# Patient Record
Sex: FEMALE | Race: WHITE | NOT HISPANIC OR LATINO | URBAN - METROPOLITAN AREA
[De-identification: names, ages, dates, MRNs, and addresses within clinical notes are randomized per-mention and may not be internally consistent; named-entity substitution may affect disease eponyms.]

---

## 2020-03-04 ENCOUNTER — EMERGENCY (EMERGENCY)
Facility: HOSPITAL | Age: 85
LOS: 1 days | Discharge: HOME | End: 2020-03-04
Attending: EMERGENCY MEDICINE | Admitting: EMERGENCY MEDICINE
Payer: MEDICARE

## 2020-03-04 VITALS
RESPIRATION RATE: 18 BRPM | TEMPERATURE: 98 F | HEIGHT: 62 IN | OXYGEN SATURATION: 95 % | WEIGHT: 175.05 LBS | HEART RATE: 84 BPM | DIASTOLIC BLOOD PRESSURE: 73 MMHG | SYSTOLIC BLOOD PRESSURE: 191 MMHG

## 2020-03-04 PROCEDURE — 72100 X-RAY EXAM L-S SPINE 2/3 VWS: CPT | Mod: 26

## 2020-03-04 PROCEDURE — 73090 X-RAY EXAM OF FOREARM: CPT | Mod: 26,LT

## 2020-03-04 PROCEDURE — 73130 X-RAY EXAM OF HAND: CPT | Mod: 26,LT

## 2020-03-04 PROCEDURE — 99284 EMERGENCY DEPT VISIT MOD MDM: CPT

## 2020-03-04 PROCEDURE — 73502 X-RAY EXAM HIP UNI 2-3 VIEWS: CPT | Mod: 26,LT

## 2020-03-04 PROCEDURE — 70450 CT HEAD/BRAIN W/O DYE: CPT | Mod: 26

## 2020-03-04 PROCEDURE — 72125 CT NECK SPINE W/O DYE: CPT | Mod: 26

## 2020-03-04 PROCEDURE — 73110 X-RAY EXAM OF WRIST: CPT | Mod: 26,LT

## 2020-03-04 RX ORDER — LIDOCAINE 4 G/100G
1 CREAM TOPICAL ONCE
Refills: 0 | Status: COMPLETED | OUTPATIENT
Start: 2020-03-04 | End: 2020-03-04

## 2020-03-04 RX ORDER — ACETAMINOPHEN 500 MG
650 TABLET ORAL ONCE
Refills: 0 | Status: COMPLETED | OUTPATIENT
Start: 2020-03-04 | End: 2020-03-04

## 2020-03-04 RX ADMIN — Medication 650 MILLIGRAM(S): at 23:49

## 2020-03-04 RX ADMIN — Medication 650 MILLIGRAM(S): at 22:49

## 2020-03-04 NOTE — ED PROVIDER NOTE - PMH
Diabetes    Hyperlipidemia, unspecified hyperlipidemia type    Hypertension, unspecified type    Stented coronary artery

## 2020-03-04 NOTE — ED PROVIDER NOTE - ATTENDING CONTRIBUTION TO CARE
84yo female s/p fall with several complaints, no LOC, no blood thinners  exam:+cervical spasm, no midlline vertebral tenderness, LUE diffusely tender, no deformity, neurovasc intact  plan: CT head and neck, Xr left upper extremity  agree with assessment and plan of PA

## 2020-03-04 NOTE — ED ADULT NURSE NOTE - CAS TRG GENERAL NORM CIRC DET
DATA - [facility] request to assist with medication insurance coverage issues r/t med home delivery/set-up.    ACTION - Request received from Saritha Workman [facility] this week.  Brief chart review and discussion with [facility].  Recent conversations between [facility] and [facility] indicate that [facility] cannot provide home med set-up under client's current Part D insurance, but most likely could with other plans such as [facility].  [facility] requesting [facility]'s assisting in reviewing and assisting client with possible insurance change as recommended.    PLAN - [facility] will review Part D plan options via Medicare.gov and make further recommendations.  If client is interested in changing plans, [facility] should be able to assist him in doing so.  Also will want to review client's current status with long-term care (LTC) services, as per [facility] noted that client may be enrolled with the [employer] program.  Involvement with LTC services may or may not impact medication coverage options.    Strong peripheral pulses

## 2020-03-04 NOTE — ED ADULT NURSE NOTE - CHIEF COMPLAINT QUOTE
tripped down 3 steps, denies hitting head, left forearm swelling, +CMS, +radial pulses, able to wiggle fingers. bruising to left leg also. Detail Level: Detailed Quality 131: Pain Assessment And Follow-Up: Pain assessment documented as positive using a standardized tool AND a follow-up plan is documented Quality 130: Documentation Of Current Medications In The Medical Record: Current Medications Documented Additional Notes: 6/10 for knee Quality 474: Zoster Vaccination Status: Shingrix Vaccination Administered or Previously Received

## 2020-03-04 NOTE — ED PROVIDER NOTE - PATIENT PORTAL LINK FT
You can access the FollowMyHealth Patient Portal offered by [facility] by registering at the following website: [email or web address]. By joining [facility]’s FollowMyHealth portal, you will also be able to view your health information using other applications (apps) compatible with our system.

## 2020-03-04 NOTE — ED PROVIDER NOTE - CLINICAL SUMMARY MEDICAL DECISION MAKING FREE TEXT BOX
mechanical fall down 3 steps, on plavix, left forearm injury, r/o fx, neck pain, left buttock/hip pain with rom,  will obtain imaging, give pain med

## 2020-03-04 NOTE — ED ADULT NURSE NOTE - CHPI ED NUR SYMPTOMS NEG
no fever/no loss of consciousness/no deformity/no weakness/no numbness/no vomiting/no bleeding/no abrasion/no confusion/no tingling

## 2020-03-04 NOTE — ED ADULT NURSE NOTE - OBJECTIVE STATEMENT
pt walked in s/p fall, reports she tumbled 3 steps down, denies hitting head, no LOC, presented with left forearm swelling and mild pain. Ecchymosis noted to left hip. pt ambulatory, +ROM all extremities, denies numbness or tingling.

## 2020-03-04 NOTE — ED PROVIDER NOTE - LOCATION
head, neck, left wrist, left forearm, left hip/buttock hip/left hip buttock pain with palpation, rom, left buttock small hematoma noted wrist/left wrist, hand, forearm pain with palpation, + bruising, swelling/hand

## 2020-03-04 NOTE — ED ADULT TRIAGE NOTE - CHIEF COMPLAINT QUOTE
tripped down 3 steps, denies hitting head, left forearm swelling, +CMS, +radial pulses, able to wiggle fingers. bruising to left leg also.

## 2020-03-04 NOTE — ED PROVIDER NOTE - CARE PLAN
Principal Discharge DX:	Fall, initial encounter Principal Discharge DX:	Fall, initial encounter  Secondary Diagnosis:	Contusion

## 2020-03-04 NOTE — ED PROVIDER NOTE - OBJECTIVE STATEMENT
85 y female accompanied to ED by son and daughter in who states patient was coming out of a building was walking down steps, lost her balance, fell down 3 steps, struck her left forearm, left buttock,  no loc,  no vomiting, not sure if she hit her head , has neck pain with rom.  patient ambulated after fall, patient is on plavix,  PMD Dr Gutiérrez, No orthopedic

## 2020-03-05 VITALS
TEMPERATURE: 99 F | HEART RATE: 83 BPM | RESPIRATION RATE: 14 BRPM | DIASTOLIC BLOOD PRESSURE: 58 MMHG | OXYGEN SATURATION: 96 % | SYSTOLIC BLOOD PRESSURE: 137 MMHG

## 2020-03-05 PROCEDURE — 99284 EMERGENCY DEPT VISIT MOD MDM: CPT | Mod: 25

## 2020-03-05 PROCEDURE — 73130 X-RAY EXAM OF HAND: CPT

## 2020-03-05 PROCEDURE — 73090 X-RAY EXAM OF FOREARM: CPT

## 2020-03-05 PROCEDURE — 73502 X-RAY EXAM HIP UNI 2-3 VIEWS: CPT

## 2020-03-05 PROCEDURE — 72125 CT NECK SPINE W/O DYE: CPT

## 2020-03-05 PROCEDURE — 73110 X-RAY EXAM OF WRIST: CPT

## 2020-03-05 PROCEDURE — 70450 CT HEAD/BRAIN W/O DYE: CPT

## 2020-03-05 PROCEDURE — 72100 X-RAY EXAM L-S SPINE 2/3 VWS: CPT

## 2020-03-05 RX ADMIN — LIDOCAINE 1 PATCH: 4 CREAM TOPICAL at 00:42

## 2021-01-29 ENCOUNTER — ASOB RESULT (OUTPATIENT)
Age: 86
End: 2021-01-29

## 2021-01-29 ENCOUNTER — APPOINTMENT (OUTPATIENT)
Dept: ANTEPARTUM | Facility: CLINIC | Age: 86
End: 2021-01-29
Payer: MEDICARE

## 2021-01-29 PROBLEM — Z95.5 PRESENCE OF CORONARY ANGIOPLASTY IMPLANT AND GRAFT: Chronic | Status: ACTIVE | Noted: 2020-03-04

## 2021-01-29 PROBLEM — I10 ESSENTIAL (PRIMARY) HYPERTENSION: Chronic | Status: ACTIVE | Noted: 2020-03-04

## 2021-01-29 PROBLEM — E78.5 HYPERLIPIDEMIA, UNSPECIFIED: Chronic | Status: ACTIVE | Noted: 2020-03-04

## 2021-01-29 PROBLEM — Z00.00 ENCOUNTER FOR PREVENTIVE HEALTH EXAMINATION: Status: ACTIVE | Noted: 2021-01-29

## 2021-01-29 PROBLEM — E11.9 TYPE 2 DIABETES MELLITUS WITHOUT COMPLICATIONS: Chronic | Status: ACTIVE | Noted: 2020-03-04

## 2021-01-29 PROCEDURE — 76856 US EXAM PELVIC COMPLETE: CPT | Mod: 59

## 2021-01-29 PROCEDURE — 76830 TRANSVAGINAL US NON-OB: CPT

## 2021-11-22 ENCOUNTER — TRANSCRIPTION ENCOUNTER (OUTPATIENT)
Age: 86
End: 2021-11-22

## 2022-06-25 ENCOUNTER — NON-APPOINTMENT (OUTPATIENT)
Age: 87
End: 2022-06-25

## 2022-06-26 ENCOUNTER — EMERGENCY (EMERGENCY)
Facility: HOSPITAL | Age: 87
LOS: 1 days | Discharge: HOME | End: 2022-06-26
Attending: EMERGENCY MEDICINE | Admitting: EMERGENCY MEDICINE

## 2022-06-26 ENCOUNTER — EMERGENCY (EMERGENCY)
Facility: HOSPITAL | Age: 87
LOS: 1 days | Discharge: ANOTHER HOSPITAL | End: 2022-06-26
Attending: EMERGENCY MEDICINE | Admitting: EMERGENCY MEDICINE
Payer: MEDICARE

## 2022-06-26 VITALS
HEART RATE: 80 BPM | RESPIRATION RATE: 20 BRPM | DIASTOLIC BLOOD PRESSURE: 65 MMHG | OXYGEN SATURATION: 97 % | SYSTOLIC BLOOD PRESSURE: 105 MMHG

## 2022-06-26 VITALS
WEIGHT: 162.04 LBS | RESPIRATION RATE: 16 BRPM | TEMPERATURE: 98 F | SYSTOLIC BLOOD PRESSURE: 141 MMHG | DIASTOLIC BLOOD PRESSURE: 69 MMHG | OXYGEN SATURATION: 94 % | HEART RATE: 80 BPM | HEIGHT: 62 IN

## 2022-06-26 VITALS
HEIGHT: 62 IN | WEIGHT: 162.04 LBS | SYSTOLIC BLOOD PRESSURE: 198 MMHG | HEART RATE: 89 BPM | TEMPERATURE: 98 F | RESPIRATION RATE: 14 BRPM | OXYGEN SATURATION: 96 % | DIASTOLIC BLOOD PRESSURE: 96 MMHG

## 2022-06-26 LAB
ALBUMIN SERPL ELPH-MCNC: 3.1 G/DL — LOW (ref 3.3–5)
ALP SERPL-CCNC: 59 U/L — SIGNIFICANT CHANGE UP (ref 40–120)
ALT FLD-CCNC: 17 U/L — SIGNIFICANT CHANGE UP (ref 12–78)
ANION GAP SERPL CALC-SCNC: 4 MMOL/L — LOW (ref 5–17)
APPEARANCE UR: CLEAR — SIGNIFICANT CHANGE UP
APTT BLD: 23.7 SEC — LOW (ref 27.5–35.5)
AST SERPL-CCNC: 12 U/L — LOW (ref 15–37)
BACTERIA # UR AUTO: ABNORMAL
BILIRUB SERPL-MCNC: 0.5 MG/DL — SIGNIFICANT CHANGE UP (ref 0.2–1.2)
BILIRUB UR-MCNC: NEGATIVE — SIGNIFICANT CHANGE UP
BUN SERPL-MCNC: 29 MG/DL — HIGH (ref 7–23)
CALCIUM SERPL-MCNC: 9.1 MG/DL — SIGNIFICANT CHANGE UP (ref 8.5–10.1)
CHLORIDE SERPL-SCNC: 104 MMOL/L — SIGNIFICANT CHANGE UP (ref 96–108)
CK MB BLD-MCNC: 4.3 % — HIGH (ref 0–3.5)
CK MB CFR SERPL CALC: 2.4 NG/ML — SIGNIFICANT CHANGE UP (ref 0–3.6)
CK SERPL-CCNC: 56 U/L — SIGNIFICANT CHANGE UP (ref 26–192)
CO2 SERPL-SCNC: 30 MMOL/L — SIGNIFICANT CHANGE UP (ref 22–31)
COLOR SPEC: YELLOW — SIGNIFICANT CHANGE UP
CREAT SERPL-MCNC: 0.91 MG/DL — SIGNIFICANT CHANGE UP (ref 0.5–1.3)
DIFF PNL FLD: ABNORMAL
EGFR: 61 ML/MIN/1.73M2 — SIGNIFICANT CHANGE UP
EPI CELLS # UR: ABNORMAL
FLUAV AG NPH QL: SIGNIFICANT CHANGE UP
FLUBV AG NPH QL: SIGNIFICANT CHANGE UP
GLUCOSE BLDC GLUCOMTR-MCNC: 199 MG/DL — HIGH (ref 70–99)
GLUCOSE SERPL-MCNC: 180 MG/DL — HIGH (ref 70–99)
GLUCOSE UR QL: NEGATIVE — SIGNIFICANT CHANGE UP
HCT VFR BLD CALC: 37.3 % — SIGNIFICANT CHANGE UP (ref 34.5–45)
HGB BLD-MCNC: 11.5 G/DL — SIGNIFICANT CHANGE UP (ref 11.5–15.5)
INR BLD: 1.07 RATIO — SIGNIFICANT CHANGE UP (ref 0.88–1.16)
KETONES UR-MCNC: NEGATIVE — SIGNIFICANT CHANGE UP
LEUKOCYTE ESTERASE UR-ACNC: ABNORMAL
MCHC RBC-ENTMCNC: 28.3 PG — SIGNIFICANT CHANGE UP (ref 27–34)
MCHC RBC-ENTMCNC: 30.8 GM/DL — LOW (ref 32–36)
MCV RBC AUTO: 91.9 FL — SIGNIFICANT CHANGE UP (ref 80–100)
NITRITE UR-MCNC: NEGATIVE — SIGNIFICANT CHANGE UP
NRBC # BLD: 0 /100 WBCS — SIGNIFICANT CHANGE UP (ref 0–0)
PH UR: 6 — SIGNIFICANT CHANGE UP (ref 5–8)
PLATELET # BLD AUTO: 169 K/UL — SIGNIFICANT CHANGE UP (ref 150–400)
POTASSIUM SERPL-MCNC: 4.5 MMOL/L — SIGNIFICANT CHANGE UP (ref 3.5–5.3)
POTASSIUM SERPL-SCNC: 4.5 MMOL/L — SIGNIFICANT CHANGE UP (ref 3.5–5.3)
PROT SERPL-MCNC: 7.2 G/DL — SIGNIFICANT CHANGE UP (ref 6–8.3)
PROT UR-MCNC: NEGATIVE — SIGNIFICANT CHANGE UP
PROTHROM AB SERPL-ACNC: 12.5 SEC — SIGNIFICANT CHANGE UP (ref 10.5–13.4)
RBC # BLD: 4.06 M/UL — SIGNIFICANT CHANGE UP (ref 3.8–5.2)
RBC # FLD: 13.1 % — SIGNIFICANT CHANGE UP (ref 10.3–14.5)
RBC CASTS # UR COMP ASSIST: ABNORMAL /HPF (ref 0–4)
RSV RNA NPH QL NAA+NON-PROBE: SIGNIFICANT CHANGE UP
SARS-COV-2 RNA SPEC QL NAA+PROBE: SIGNIFICANT CHANGE UP
SODIUM SERPL-SCNC: 138 MMOL/L — SIGNIFICANT CHANGE UP (ref 135–145)
SP GR SPEC: 1.01 — SIGNIFICANT CHANGE UP (ref 1.01–1.02)
TROPONIN I, HIGH SENSITIVITY RESULT: 15.6 NG/L — SIGNIFICANT CHANGE UP
UROBILINOGEN FLD QL: NEGATIVE — SIGNIFICANT CHANGE UP
WBC # BLD: 9.01 K/UL — SIGNIFICANT CHANGE UP (ref 3.8–10.5)
WBC # FLD AUTO: 9.01 K/UL — SIGNIFICANT CHANGE UP (ref 3.8–10.5)
WBC UR QL: ABNORMAL

## 2022-06-26 PROCEDURE — 85027 COMPLETE CBC AUTOMATED: CPT

## 2022-06-26 PROCEDURE — 70450 CT HEAD/BRAIN W/O DYE: CPT | Mod: 26,ME

## 2022-06-26 PROCEDURE — 99283 EMERGENCY DEPT VISIT LOW MDM: CPT

## 2022-06-26 PROCEDURE — 84484 ASSAY OF TROPONIN QUANT: CPT

## 2022-06-26 PROCEDURE — 82553 CREATINE MB FRACTION: CPT

## 2022-06-26 PROCEDURE — 87637 SARSCOV2&INF A&B&RSV AMP PRB: CPT

## 2022-06-26 PROCEDURE — 85730 THROMBOPLASTIN TIME PARTIAL: CPT

## 2022-06-26 PROCEDURE — 99285 EMERGENCY DEPT VISIT HI MDM: CPT | Mod: 25

## 2022-06-26 PROCEDURE — 70450 CT HEAD/BRAIN W/O DYE: CPT | Mod: ME

## 2022-06-26 PROCEDURE — 71045 X-RAY EXAM CHEST 1 VIEW: CPT

## 2022-06-26 PROCEDURE — 71045 X-RAY EXAM CHEST 1 VIEW: CPT | Mod: 26

## 2022-06-26 PROCEDURE — 93010 ELECTROCARDIOGRAM REPORT: CPT

## 2022-06-26 PROCEDURE — G1004: CPT

## 2022-06-26 PROCEDURE — 85610 PROTHROMBIN TIME: CPT

## 2022-06-26 PROCEDURE — 93005 ELECTROCARDIOGRAM TRACING: CPT

## 2022-06-26 PROCEDURE — 99285 EMERGENCY DEPT VISIT HI MDM: CPT | Mod: FS

## 2022-06-26 PROCEDURE — 36415 COLL VENOUS BLD VENIPUNCTURE: CPT

## 2022-06-26 PROCEDURE — 82962 GLUCOSE BLOOD TEST: CPT

## 2022-06-26 PROCEDURE — 82550 ASSAY OF CK (CPK): CPT

## 2022-06-26 PROCEDURE — 81001 URINALYSIS AUTO W/SCOPE: CPT

## 2022-06-26 PROCEDURE — 80053 COMPREHEN METABOLIC PANEL: CPT

## 2022-06-26 RX ORDER — GLIMEPIRIDE 1 MG
1 TABLET ORAL
Qty: 0 | Refills: 0 | DISCHARGE

## 2022-06-26 RX ORDER — SITAGLIPTIN AND METFORMIN HYDROCHLORIDE 500; 50 MG/1; MG/1
1 TABLET, FILM COATED ORAL
Qty: 0 | Refills: 0 | DISCHARGE

## 2022-06-26 NOTE — ED PROVIDER NOTE - CHPI ED SYMPTOMS NEG
no fever/no loss of consciousness/no nausea/no numbness/no syncope/no vomiting/no weakness/no change in level of consciousness

## 2022-06-26 NOTE — ED PROVIDER NOTE - NEURO NEGATIVE STATEMENT, MLM
++ loss of consciousness today, no gait abnormality, no headache, no sensory deficits, and no weakness.? seizure today ?

## 2022-06-26 NOTE — ED PROVIDER NOTE - CARE PROVIDER_API CALL
Joseluis Salcedo)  Internal Medicine  [address]  [place] [address]  Phone: [phone]  Fax: [phone]  Established Patient  Follow Up Time: 1-3 Days    Tay Desai)  Otolaryngology  [address], [address]  [place] [address]  Phone: [phone]  Fax: [phone]  Follow Up Time: Urgent

## 2022-06-26 NOTE — ED PROVIDER NOTE - NSFOLLOWUPINSTRUCTIONS_ED_ALL_ED_FT
Nosebleed, Adult      A nosebleed is when blood comes out of the nose. Nosebleeds are common and can be caused by many things. They are usually not a sign of a serious medical problem.      Follow these instructions at home:      When you have a nosebleed:      •Sit down.      •Tilt your head a little forward.    •Follow these steps:  1.Pinch your nose with a clean towel or tissue.      2.Keep pinching your nose for 5 minutes. Do not let go.      3.After 5 minutes, let go of your nose.      4.If there is still bleeding, do these steps again. Keep doing these steps until the bleeding stops.        • Do not put tissues or other things in your nose to stop the bleeding.      •Avoid lying down or putting your head back.      •Use a nose spray decongestant as told by your doctor.      After a nosebleed:     •Try not to blow your nose or sniffle for several hours.      •Try not to strain, lift, or bend at the waist for several days.    •Aspirin and blood-thinning medicines make bleeding more likely. If you take these medicines:  •Ask your doctor if you should stop taking them or if you should change how much you take.      •Do not stop taking the medicine unless your doctor tells you to.      •If your nosebleed was caused by dryness, use over-the-counter saline nasal spray or gel and humidifier as told by your doctor. This will keep the inside of your nose moist and allow it to heal. If you need to use one of these products:  •Choose one that is water-soluble.       •Use only as much as you need and use it only as often as needed.       •Do not lie down right away after you use it.      •If you get nosebleeds often, talk with your doctor about treatments. These may include:  •Nasal cautery. A chemical swab or electrical device is used to lightly burn tiny blood vessels inside the nose. This helps stop or prevent nosebleeds.      •Nasal packing. A gauze or other material is placed in the nose to keep constant pressure on the bleeding area.          Contact a doctor if:    •You have a fever.      •You get nosebleeds often.      •You are getting nosebleeds more often than usual.      •You bruise very easily.      •You have something stuck in your nose.      •You have bleeding in your mouth.      •You vomit or cough up brown material.      •You get a nosebleed after you start a new medicine.        Get help right away if:    •You have a nosebleed after you fall or hurt your head.      •Your nosebleed does not go away after 20 minutes.      •You feel dizzy or weak.      •You have unusual bleeding from other parts of your body.      •You have unusual bruising on other parts of your body.      •You get sweaty.      •You vomit blood.        Summary    •Nosebleeds are common. They are usually not a sign of a serious medical problem.      •When you have a nosebleed, sit down and tilt your head a little forward. Pinch your nose with a clean tissue for 5 minutes.      •Use saline spray or saline gel and a humidifier as told by your doctor.      •Get help right away if your nosebleed does not go away after 20 minutes.      This information is not intended to replace advice given to you by your health care provider. Make sure you discuss any questions you have with your health care provider.

## 2022-06-26 NOTE — ED PROVIDER NOTE - PROVIDER TOKENS
PROVIDER:[TOKEN:[[id number]],FOLLOWUP:[1-3 Days],ESTABLISHEDPATIENT:[T]],PROVIDER:[TOKEN:[[id number]],FOLLOWUP:[Urgent]]

## 2022-06-26 NOTE — ED ADULT NURSE NOTE - OBJECTIVE STATEMENT
89yo female BIBA as per ems "she had a sudden nose bleed, shes on Eliquis".  pt denies any pain/distress.

## 2022-06-26 NOTE — ED ADULT NURSE NOTE - CHIEF COMPLAINT QUOTE
patient passed out sitting on a chair- was at [facility] earlier for epistaxis from the right nostril. Fsbs- 160

## 2022-06-26 NOTE — ED PROVIDER NOTE - OBJECTIVE STATEMENT
89 yo female with PMHX DM, HTN, HLD, CAD present to ED s/p syncopal episode today.   Per daughter patient was seen in [place] ED for nose bleed today which resolved on its own.   Then went home. While at home patient said that she did not feel well. Felt like she would pass out. Then while sitting patient head went back and food that she had been eating fell out of her mouth. Entire episode lasted about 30 seconds. Patient lost her urine during episode. After episode patient was disorientated for a short amount of time. Patient had h/o seizure as a child and resolved after menarche.   Denies head trauma, HA, dizziness or lightheadedness. Pt has known h/o vertigo. Dr Gutiérrez, is PCP.   Denies CP or SOB. Denies abd pain n/v/d/c.   Denies urinary symptoms.

## 2022-06-26 NOTE — ED PROVIDER NOTE - ENMT, MLM
Airway patent, Nasal with dried blood. Mouth with normal mucosa. Throat has no vesicles, no oropharyngeal exudates and uvula is midline.

## 2022-06-26 NOTE — ED PROVIDER NOTE - PATIENT PORTAL LINK FT
You can access the FollowMyHealth Patient Portal offered by [facility] by registering at the following website: [email or web address]. By joining [employer]’s FollowMyHealth portal, you will also be able to view your health information using other applications (apps) compatible with our system.

## 2022-06-26 NOTE — ED ADULT NURSE NOTE - OBJECTIVE STATEMENT
Patient is 87yo F presents with c/o syncope episode. Patient reports she was seen at [facility] earlier today with nose bleed, bleeding stopped and she was discharged home. Patient is 89yo F presents with c/o syncope episode. Patient reports she was seen at [facility] earlier today with nose bleed, bleeding stopped and she was discharged home. Patient went home and was Patient is 87yo F presents with c/o syncope episode. Patient reports she was seen at [facility] earlier today with nose bleed, bleeding stopped and she was discharged home. Patient went home with daughter, reported to daughter "I do not feel well" then had a near syncope episode. Patient reports she felt nausea and pain from her stomach radiating up prior to near syncope episode while sitting in chair. Daughter repors the patient had food in her mouth at the time so she pushed her forward so she would not choke on it.  Daughter reports patient did not fully lose consciousness but was "not fully there". Patient AOx3 now, reports her head still feels a little cloudy, denies CP or SOB.

## 2022-06-26 NOTE — ED PROVIDER NOTE - OBJECTIVE STATEMENT
89 yo F with hx of HTN BIBA for eval of rt sided nosebleed today. States she scratched her nose and it started to bleed. Takes baby ASA daily. PCP [facility].  Bleeding has no stopped. Pt states she feels well.

## 2022-06-26 NOTE — ED PROVIDER NOTE - NS_EDPROVIDERDISPOUSERTYPE_ED_A_ED
11-Jun-2022 13:36 11-Jun-2022 22:10 12-Jun-2022 14:11 17-Jun-2022 15:59 12-Jun-2022 12:37 Attending Attestation (For Attendings USE Only)...

## 2022-06-26 NOTE — ED ADULT NURSE NOTE - NSICDXPASTMEDICALHX_GEN_ALL_CORE_FT
PAST MEDICAL HISTORY:  Diabetes     Hyperlipidemia, unspecified hyperlipidemia type     Hypertension, unspecified type     Stented coronary artery

## 2022-06-26 NOTE — ED PROVIDER NOTE - CLINICAL SUMMARY MEDICAL DECISION MAKING FREE TEXT BOX
dorothy 89 yo F pw sp syncopal episode today . Pt had small episode of epistaxis earlier today - was seen at [facility] ed, but had stopped and no intervention was needed. Pt now went home, then co feeling dizzy , then passed out. Pts daughter was with pt - states never fully passed out - ?? absence type seizure - unclear post-ictal. No acute chest pain. no dyspnea. no CONTI / easy recent fatigue. no numb/ting/focal weak. no agg/allev factors. no other acute co or changes.  Pt vaccinated for covid.  exam; MM MOist. neck supple. no meningeal signs. abd soft, NT. no hsm. no cvat. cta bl, no w/r/r. nl cardiac exam. no c/c/e. nl non-focal neuro exam. no other acute findings.  check labs, CT, dw neuro / cardio.  [facility], neuro / cardio- pt being xferred to [facility] for admission due to no EEG capability at [facility].

## 2022-06-26 NOTE — ED PROVIDER NOTE - PROGRESS NOTE DETAILS
Per Dr. Tony, pt should be evaluated by cardiology.   If cardiology clears for dc then pt to be dc home for outpatient EEG.   If cardiology wants to admit then transfer to [facility] for EEG. case d/w Dr. Byrd at [facility], neurology.   Will speak with cardiology at [facility].

## 2022-06-26 NOTE — ED ADULT TRIAGE NOTE - CHIEF COMPLAINT QUOTE
patient passed out sitting on a chair- was at [facility] earlier for epistaxis from the right nostril. Fsbs- 160

## 2022-06-27 ENCOUNTER — INPATIENT (INPATIENT)
Facility: HOSPITAL | Age: 87
LOS: 8 days | Discharge: HOME | DRG: 287 | End: 2022-07-06
Attending: INTERNAL MEDICINE | Admitting: INTERNAL MEDICINE
Payer: MEDICARE

## 2022-06-27 VITALS
HEIGHT: 62 IN | DIASTOLIC BLOOD PRESSURE: 74 MMHG | HEART RATE: 77 BPM | WEIGHT: 176.37 LBS | TEMPERATURE: 98 F | SYSTOLIC BLOOD PRESSURE: 148 MMHG | OXYGEN SATURATION: 96 % | RESPIRATION RATE: 18 BRPM

## 2022-06-27 VITALS — TEMPERATURE: 98 F | HEART RATE: 84 BPM | SYSTOLIC BLOOD PRESSURE: 158 MMHG | DIASTOLIC BLOOD PRESSURE: 84 MMHG

## 2022-06-27 DIAGNOSIS — R55 SYNCOPE AND COLLAPSE: ICD-10-CM

## 2022-06-27 DIAGNOSIS — E11.9 TYPE 2 DIABETES MELLITUS WITHOUT COMPLICATIONS: ICD-10-CM

## 2022-06-27 DIAGNOSIS — E78.5 HYPERLIPIDEMIA, UNSPECIFIED: ICD-10-CM

## 2022-06-27 DIAGNOSIS — I10 ESSENTIAL (PRIMARY) HYPERTENSION: ICD-10-CM

## 2022-06-27 LAB
ALBUMIN SERPL ELPH-MCNC: 3.5 G/DL — SIGNIFICANT CHANGE UP (ref 3.3–5)
ALP SERPL-CCNC: 57 U/L — SIGNIFICANT CHANGE UP (ref 40–120)
ALT FLD-CCNC: 13 U/L — SIGNIFICANT CHANGE UP (ref 10–45)
ANION GAP SERPL CALC-SCNC: 11 MMOL/L — SIGNIFICANT CHANGE UP (ref 5–17)
AST SERPL-CCNC: 13 U/L — SIGNIFICANT CHANGE UP (ref 10–40)
BASOPHILS # BLD AUTO: 0.04 K/UL — SIGNIFICANT CHANGE UP (ref 0–0.2)
BASOPHILS NFR BLD AUTO: 0.4 % — SIGNIFICANT CHANGE UP (ref 0–2)
BILIRUB SERPL-MCNC: 0.3 MG/DL — SIGNIFICANT CHANGE UP (ref 0.2–1.2)
BUN SERPL-MCNC: 28 MG/DL — HIGH (ref 7–23)
CALCIUM SERPL-MCNC: 8.8 MG/DL — SIGNIFICANT CHANGE UP (ref 8.4–10.5)
CHLORIDE SERPL-SCNC: 103 MMOL/L — SIGNIFICANT CHANGE UP (ref 96–108)
CO2 SERPL-SCNC: 24 MMOL/L — SIGNIFICANT CHANGE UP (ref 22–31)
CREAT SERPL-MCNC: 0.68 MG/DL — SIGNIFICANT CHANGE UP (ref 0.5–1.3)
EGFR: 84 ML/MIN/1.73M2 — SIGNIFICANT CHANGE UP
EOSINOPHIL # BLD AUTO: 0.22 K/UL — SIGNIFICANT CHANGE UP (ref 0–0.5)
EOSINOPHIL NFR BLD AUTO: 2.3 % — SIGNIFICANT CHANGE UP (ref 0–6)
GLUCOSE BLDC GLUCOMTR-MCNC: 109 MG/DL — HIGH (ref 70–99)
GLUCOSE BLDC GLUCOMTR-MCNC: 171 MG/DL — HIGH (ref 70–99)
GLUCOSE BLDC GLUCOMTR-MCNC: 173 MG/DL — HIGH (ref 70–99)
GLUCOSE BLDC GLUCOMTR-MCNC: 249 MG/DL — HIGH (ref 70–99)
GLUCOSE BLDC GLUCOMTR-MCNC: 358 MG/DL — HIGH (ref 70–99)
GLUCOSE SERPL-MCNC: 147 MG/DL — HIGH (ref 70–99)
HCT VFR BLD CALC: 33.2 % — LOW (ref 34.5–45)
HGB BLD-MCNC: 10.3 G/DL — LOW (ref 11.5–15.5)
IMM GRANULOCYTES NFR BLD AUTO: 0.4 % — SIGNIFICANT CHANGE UP (ref 0–1.5)
LYMPHOCYTES # BLD AUTO: 2.21 K/UL — SIGNIFICANT CHANGE UP (ref 1–3.3)
LYMPHOCYTES # BLD AUTO: 23.3 % — SIGNIFICANT CHANGE UP (ref 13–44)
MCHC RBC-ENTMCNC: 28.5 PG — SIGNIFICANT CHANGE UP (ref 27–34)
MCHC RBC-ENTMCNC: 31 GM/DL — LOW (ref 32–36)
MCV RBC AUTO: 92 FL — SIGNIFICANT CHANGE UP (ref 80–100)
MONOCYTES # BLD AUTO: 0.79 K/UL — SIGNIFICANT CHANGE UP (ref 0–0.9)
MONOCYTES NFR BLD AUTO: 8.3 % — SIGNIFICANT CHANGE UP (ref 2–14)
NEUTROPHILS # BLD AUTO: 6.18 K/UL — SIGNIFICANT CHANGE UP (ref 1.8–7.4)
NEUTROPHILS NFR BLD AUTO: 65.3 % — SIGNIFICANT CHANGE UP (ref 43–77)
NRBC # BLD: 0 /100 WBCS — SIGNIFICANT CHANGE UP (ref 0–0)
PLATELET # BLD AUTO: 151 K/UL — SIGNIFICANT CHANGE UP (ref 150–400)
POTASSIUM SERPL-MCNC: 4.3 MMOL/L — SIGNIFICANT CHANGE UP (ref 3.5–5.3)
POTASSIUM SERPL-SCNC: 4.3 MMOL/L — SIGNIFICANT CHANGE UP (ref 3.5–5.3)
PROT SERPL-MCNC: 6.6 G/DL — SIGNIFICANT CHANGE UP (ref 6–8.3)
RBC # BLD: 3.61 M/UL — LOW (ref 3.8–5.2)
RBC # FLD: 12.9 % — SIGNIFICANT CHANGE UP (ref 10.3–14.5)
SODIUM SERPL-SCNC: 138 MMOL/L — SIGNIFICANT CHANGE UP (ref 135–145)
WBC # BLD: 9.48 K/UL — SIGNIFICANT CHANGE UP (ref 3.8–10.5)
WBC # FLD AUTO: 9.48 K/UL — SIGNIFICANT CHANGE UP (ref 3.8–10.5)

## 2022-06-27 PROCEDURE — 93010 ELECTROCARDIOGRAM REPORT: CPT | Mod: GC

## 2022-06-27 PROCEDURE — 99222 1ST HOSP IP/OBS MODERATE 55: CPT

## 2022-06-27 PROCEDURE — 71045 X-RAY EXAM CHEST 1 VIEW: CPT | Mod: 26

## 2022-06-27 PROCEDURE — 99223 1ST HOSP IP/OBS HIGH 75: CPT | Mod: GC

## 2022-06-27 PROCEDURE — 99285 EMERGENCY DEPT VISIT HI MDM: CPT | Mod: GC

## 2022-06-27 RX ORDER — SODIUM CHLORIDE 9 MG/ML
1000 INJECTION, SOLUTION INTRAVENOUS
Refills: 0 | Status: DISCONTINUED | OUTPATIENT
Start: 2022-06-27 | End: 2022-07-06

## 2022-06-27 RX ORDER — INSULIN GLARGINE 100 [IU]/ML
6 INJECTION, SOLUTION SUBCUTANEOUS AT BEDTIME
Refills: 0 | Status: DISCONTINUED | OUTPATIENT
Start: 2022-06-27 | End: 2022-06-30

## 2022-06-27 RX ORDER — DEXTROSE 50 % IN WATER 50 %
15 SYRINGE (ML) INTRAVENOUS ONCE
Refills: 0 | Status: DISCONTINUED | OUTPATIENT
Start: 2022-06-27 | End: 2022-07-06

## 2022-06-27 RX ORDER — HEPARIN SODIUM 5000 [USP'U]/ML
5000 INJECTION INTRAVENOUS; SUBCUTANEOUS EVERY 12 HOURS
Refills: 0 | Status: DISCONTINUED | OUTPATIENT
Start: 2022-06-27 | End: 2022-07-06

## 2022-06-27 RX ORDER — INSULIN LISPRO 100/ML
VIAL (ML) SUBCUTANEOUS
Refills: 0 | Status: DISCONTINUED | OUTPATIENT
Start: 2022-06-27 | End: 2022-06-28

## 2022-06-27 RX ORDER — ESCITALOPRAM OXALATE 10 MG/1
10 TABLET, FILM COATED ORAL DAILY
Refills: 0 | Status: DISCONTINUED | OUTPATIENT
Start: 2022-06-27 | End: 2022-07-06

## 2022-06-27 RX ORDER — SODIUM CHLORIDE 9 MG/ML
1000 INJECTION INTRAMUSCULAR; INTRAVENOUS; SUBCUTANEOUS ONCE
Refills: 0 | Status: COMPLETED | OUTPATIENT
Start: 2022-06-27 | End: 2022-06-27

## 2022-06-27 RX ORDER — BUPROPION HYDROCHLORIDE 150 MG/1
150 TABLET, EXTENDED RELEASE ORAL DAILY
Refills: 0 | Status: DISCONTINUED | OUTPATIENT
Start: 2022-06-27 | End: 2022-07-06

## 2022-06-27 RX ORDER — CLOPIDOGREL BISULFATE 75 MG/1
75 TABLET, FILM COATED ORAL DAILY
Refills: 0 | Status: DISCONTINUED | OUTPATIENT
Start: 2022-06-27 | End: 2022-06-30

## 2022-06-27 RX ORDER — METOPROLOL TARTRATE 50 MG
50 TABLET ORAL DAILY
Refills: 0 | Status: DISCONTINUED | OUTPATIENT
Start: 2022-06-27 | End: 2022-07-01

## 2022-06-27 RX ORDER — DEXTROSE 50 % IN WATER 50 %
25 SYRINGE (ML) INTRAVENOUS ONCE
Refills: 0 | Status: DISCONTINUED | OUTPATIENT
Start: 2022-06-27 | End: 2022-07-06

## 2022-06-27 RX ORDER — LISINOPRIL 2.5 MG/1
10 TABLET ORAL DAILY
Refills: 0 | Status: DISCONTINUED | OUTPATIENT
Start: 2022-06-27 | End: 2022-06-29

## 2022-06-27 RX ORDER — ASPIRIN/CALCIUM CARB/MAGNESIUM 324 MG
81 TABLET ORAL DAILY
Refills: 0 | Status: DISCONTINUED | OUTPATIENT
Start: 2022-06-27 | End: 2022-07-06

## 2022-06-27 RX ORDER — GLUCAGON INJECTION, SOLUTION 0.5 MG/.1ML
1 INJECTION, SOLUTION SUBCUTANEOUS ONCE
Refills: 0 | Status: DISCONTINUED | OUTPATIENT
Start: 2022-06-27 | End: 2022-07-06

## 2022-06-27 RX ORDER — ALLOPURINOL 300 MG
100 TABLET ORAL DAILY
Refills: 0 | Status: DISCONTINUED | OUTPATIENT
Start: 2022-06-27 | End: 2022-07-06

## 2022-06-27 RX ORDER — INSULIN LISPRO 100/ML
VIAL (ML) SUBCUTANEOUS AT BEDTIME
Refills: 0 | Status: DISCONTINUED | OUTPATIENT
Start: 2022-06-27 | End: 2022-06-28

## 2022-06-27 RX ORDER — ATORVASTATIN CALCIUM 80 MG/1
40 TABLET, FILM COATED ORAL AT BEDTIME
Refills: 0 | Status: DISCONTINUED | OUTPATIENT
Start: 2022-06-27 | End: 2022-07-06

## 2022-06-27 RX ORDER — DEXTROSE 50 % IN WATER 50 %
12.5 SYRINGE (ML) INTRAVENOUS ONCE
Refills: 0 | Status: DISCONTINUED | OUTPATIENT
Start: 2022-06-27 | End: 2022-07-06

## 2022-06-27 RX ADMIN — ATORVASTATIN CALCIUM 40 MILLIGRAM(S): 80 TABLET, FILM COATED ORAL at 21:32

## 2022-06-27 RX ADMIN — BUPROPION HYDROCHLORIDE 150 MILLIGRAM(S): 150 TABLET, EXTENDED RELEASE ORAL at 13:32

## 2022-06-27 RX ADMIN — ESCITALOPRAM OXALATE 10 MILLIGRAM(S): 10 TABLET, FILM COATED ORAL at 12:09

## 2022-06-27 RX ADMIN — LISINOPRIL 10 MILLIGRAM(S): 2.5 TABLET ORAL at 16:22

## 2022-06-27 RX ADMIN — Medication 50 MILLIGRAM(S): at 16:22

## 2022-06-27 RX ADMIN — Medication 5: at 12:10

## 2022-06-27 RX ADMIN — SODIUM CHLORIDE 1000 MILLILITER(S): 9 INJECTION INTRAMUSCULAR; INTRAVENOUS; SUBCUTANEOUS at 03:19

## 2022-06-27 RX ADMIN — CLOPIDOGREL BISULFATE 75 MILLIGRAM(S): 75 TABLET, FILM COATED ORAL at 12:09

## 2022-06-27 RX ADMIN — INSULIN GLARGINE 6 UNIT(S): 100 INJECTION, SOLUTION SUBCUTANEOUS at 23:03

## 2022-06-27 RX ADMIN — Medication 81 MILLIGRAM(S): at 12:09

## 2022-06-27 RX ADMIN — Medication 100 MILLIGRAM(S): at 12:09

## 2022-06-27 RX ADMIN — Medication 1: at 09:10

## 2022-06-27 RX ADMIN — HEPARIN SODIUM 5000 UNIT(S): 5000 INJECTION INTRAVENOUS; SUBCUTANEOUS at 16:22

## 2022-06-27 NOTE — CONSULT NOTE ADULT - SUBJECTIVE AND OBJECTIVE BOX
HPI:  88 year old RH female     with a past medical history of HTN, HLD, DM, CAD with stents     who is presenting as a transfer from [place] for evaluation of possible seizure episode.    At baseline, the patient is ambulatory with a cane when she goes outside, lives alone and performs her own ADLs. The patient had an episode of nosebleeding for which she first presented to Urgent Care and then went to [facility] ED where her bleeding spontaneously stopped and she was discharged home. She then went home and was sitting down and eating a pastry when she told her daughter who was present that the patient felt like she was going to faint. The patient stated she had symptoms of her vision darkening, a symptom of epigastric discomfort and nausea, sweating and then had lost consciousness. As per the daughter, the patient began to lean back, her eyes rolled back, and to avoid falling backwards and vomiting was pushed forward by the daughter. The episode lasted several seconds and then the patient regained consciousness. EMS was called.    The patient then seemed slightly disoriented  which lasted several minutes and had completely resolved by the time that EMS had arrived. It was noted that there was some liquid by the patient which was concerning for urinary incontinence however it unclear as the patient stated that her undergarments and dress were dry. The patient was then brought to the ED at [place] and was to undergo cardiac workup and was transferred to [facility] as there was no EEG monitoring. Currently, the patient states that she feels well and denied headaches, visual changes, auditory changes, numbness, tingling, weakness.  Patient notes that in the past she had a fall several months ago while she was ambulating and thinks that she may have lost consciousness during that time.  Patient stated that in the past when she was 10 years old, she was diagnosed with seizures. These episodes were marked by not being able to speak for a brief period of time and disorientation. Stated that she feels that she might remember these episodes but was unsure. She denied taking medications for these events and noted that when she underwent menarche, these episodes stopped and she has not had any since.  Is taking Wellbutrin and has been taking the same dose for 16 years as per daughter.     NIHSS: 0  MRS: 0     (27 Jun 2022 07:07)  Patient has history of diabetes, A1C?, on oral meds at home. Patient follows up with PCP for diabetes management.  Endo was consulted for glycemic control.    PAST MEDICAL & SURGICAL HISTORY:  Diabetes      Hypertension, unspecified type      Stented coronary artery      Hyperlipidemia, unspecified hyperlipidemia type          FAMILY HISTORY:      Social History:    Outpatient Medications:    MEDICATIONS  (STANDING):  allopurinol 100 milliGRAM(s) Oral daily  aspirin enteric coated 81 milliGRAM(s) Oral daily  atorvastatin 40 milliGRAM(s) Oral at bedtime  buPROPion XL (24-Hour) . 150 milliGRAM(s) Oral daily  clopidogrel Tablet 75 milliGRAM(s) Oral daily  dextrose 5%. 1000 milliLiter(s) (50 mL/Hr) IV Continuous <Continuous>  dextrose 5%. 1000 milliLiter(s) (100 mL/Hr) IV Continuous <Continuous>  dextrose 50% Injectable 25 Gram(s) IV Push once  dextrose 50% Injectable 12.5 Gram(s) IV Push once  dextrose 50% Injectable 25 Gram(s) IV Push once  escitalopram 10 milliGRAM(s) Oral daily  glucagon  Injectable 1 milliGRAM(s) IntraMuscular once  heparin   Injectable 5000 Unit(s) SubCutaneous every 12 hours  insulin glargine Injectable (LANTUS) 6 Unit(s) SubCutaneous at bedtime  insulin lispro (ADMELOG) corrective regimen sliding scale   SubCutaneous at bedtime  insulin lispro (ADMELOG) corrective regimen sliding scale   SubCutaneous three times a day before meals  lisinopril 10 milliGRAM(s) Oral daily  metoprolol succinate ER 50 milliGRAM(s) Oral daily    MEDICATIONS  (PRN):  dextrose Oral Gel 15 Gram(s) Oral once PRN Blood Glucose LESS THAN 70 milliGRAM(s)/deciliter      Allergies    No Known Allergies    Intolerances      Review of Systems:  Constitutional: No fever, no chills  Eyes: No blurry vision  Neuro: No tremors  HEENT: No pain, no neck swelling  Cardiovascular: No chest pain, no palpitations  Respiratory: Has SOB, no cough  GI: No nausea, vomiting, abdominal pain  : No dysuria  Skin: no rash  MSK: Has leg swelling.  Psych: no depression  Endocrine: no polyuria, polydipsia    ALL OTHER SYSTEMS REVIEWED AND NEGATIVE    UNABLE TO OBTAIN    PHYSICAL EXAM:  VITALS: T(C): 36.7 (06-27-22 @ 16:14)  T(F): 98 (06-27-22 @ 16:14), Max: 98.5 (06-26-22 @ 18:24)  HR: 81 (06-27-22 @ 16:14) (72 - 84)  BP: 146/79 (06-27-22 @ 16:14) (139/80 - 166/68)  RR:  (15 - 22)  SpO2:  (95% - 98%)  Wt(kg): --  GENERAL: NAD, well-groomed, well-developed  EYES: No proptosis, no lid lag  HEENT:  Atraumatic, Normocephalic  THYROID: Normal size, no palpable nodules  RESPIRATORY: Clear to auscultation bilaterally; No rales, rhonchi, wheezing  CARDIOVASCULAR: Si S2, No murmurs;  GI: Soft, non distended, normal bowel sounds  SKIN: Dry, intact, No rashes or lesions  MUSCULOSKELETAL: Has BL lower extremity edema.  NEURO:  no tremor, sensation decreased in feet BL,    POCT Blood Glucose.: 358 mg/dL (06-27-22 @ 11:28)  POCT Blood Glucose.: 171 mg/dL (06-27-22 @ 09:00)  POCT Blood Glucose.: 141 mg/dL (06-26-22 @ 18:21)  POCT Blood Glucose.: 199 mg/dL (06-26-22 @ 12:26)                            10.3   9.48  )-----------( 151      ( 27 Jun 2022 02:27 )             33.2       06-27    138  |  103  |  28<H>  ----------------------------<  147<H>  4.3   |  24  |  0.68    eGFR: 84    Ca    8.8      06-27    TPro  6.6  /  Alb  3.5  /  TBili  0.3  /  DBili  x   /  AST  13  /  ALT  13  /  AlkPhos  57  06-27      Thyroid Function Tests:              Radiology:                    HPI:  88 year old RH female     with a past medical history of HTN, HLD, DM, CAD with stents     who is presenting as a transfer from [place] for evaluation of possible seizure episode.    At baseline, the patient is ambulatory with a cane when she goes outside, lives alone and performs her own ADLs. The patient had an episode of  nosebleeding for which she first presented to Urgent Care and then went to [facility] ED where her bleeding spontaneously stopped and she was discharged home. She then went home and was sitting down and eating a pastry when she told her daughter who was present that the patient felt like she was going to faint. The patient stated she had symptoms of her vision darkening, a symptom of epigastric discomfort and nausea, sweating and then had lost consciousness. As per the daughter, the patient began to lean back, her eyes rolled back, and to avoid falling backwards and vomiting was pushed forward by the daughter. The episode lasted several seconds and then the patient regained consciousness. EMS was called.    The patient then seemed slightly disoriented  which lasted several minutes and had completely resolved by the time that EMS had arrived. It was noted that there was some liquid by the patient which was concerning for urinary incontinence however it unclear as the patient stated that her undergarments and dress were dry. The patient was then brought to the ED at [place] and was to undergo cardiac workup and was transferred to [facility] as there was no EEG monitoring. Currently, the patient states that she feels well and denied headaches, visual changes, auditory changes, numbness, tingling, weakness.  Patient notes that in the past she had a fall several months ago while she was ambulating and thinks that she may have lost consciousness during that time.  Patient stated that in the past when she was 10 years old, she was diagnosed with seizures. These episodes were marked by not being able to speak for a brief period of time and disorientation. Stated that she feels that she might remember these episodes but was unsure. She denied taking medications for these events and noted that when she underwent menarche, these episodes stopped and she has not had any since.  Is taking Wellbutrin and has been taking the same dose for 16 years as per daughter.     NIHSS: 0  MRS: 0     (27 Jun 2022 07:07)  Patient has history of diabetes, A1C?, on oral meds at home. Patient follows up with PCP for diabetes management.  Endo was consulted for glycemic control.    PAST MEDICAL & SURGICAL HISTORY:  Diabetes      Hypertension, unspecified type      Stented coronary artery      Hyperlipidemia, unspecified hyperlipidemia type          FAMILY HISTORY:      Social History:    Outpatient Medications:    MEDICATIONS  (STANDING):  allopurinol 100 milliGRAM(s) Oral daily  aspirin enteric coated 81 milliGRAM(s) Oral daily  atorvastatin 40 milliGRAM(s) Oral at bedtime  buPROPion XL (24-Hour) . 150 milliGRAM(s) Oral daily  clopidogrel Tablet 75 milliGRAM(s) Oral daily  dextrose 5%. 1000 milliLiter(s) (50 mL/Hr) IV Continuous <Continuous>  dextrose 5%. 1000 milliLiter(s) (100 mL/Hr) IV Continuous <Continuous>  dextrose 50% Injectable 25 Gram(s) IV Push once  dextrose 50% Injectable 12.5 Gram(s) IV Push once  dextrose 50% Injectable 25 Gram(s) IV Push once  escitalopram 10 milliGRAM(s) Oral daily  glucagon  Injectable 1 milliGRAM(s) IntraMuscular once  heparin   Injectable 5000 Unit(s) SubCutaneous every 12 hours  insulin glargine Injectable (LANTUS) 6 Unit(s) SubCutaneous at bedtime  insulin lispro (ADMELOG) corrective regimen sliding scale   SubCutaneous at bedtime  insulin lispro (ADMELOG) corrective regimen sliding scale   SubCutaneous three times a day before meals  lisinopril 10 milliGRAM(s) Oral daily  metoprolol succinate ER 50 milliGRAM(s) Oral daily    MEDICATIONS  (PRN):  dextrose Oral Gel 15 Gram(s) Oral once PRN Blood Glucose LESS THAN 70 milliGRAM(s)/deciliter      Allergies    No Known Allergies    Intolerances      Review of Systems:  Constitutional: No fever, no chills  Eyes: No blurry vision  Neuro: No tremors  HEENT: No pain, no neck swelling  Cardiovascular: No chest pain, no palpitations  Respiratory: Has SOB, no cough  GI: No nausea, vomiting, abdominal pain  : No dysuria  Skin: no rash  MSK: Has leg swelling.  Psych: no depression  Endocrine: no polyuria, polydipsia    ALL OTHER SYSTEMS REVIEWED AND NEGATIVE    UNABLE TO OBTAIN    PHYSICAL EXAM:  VITALS: T(C): 36.7 (06-27-22 @ 16:14)  T(F): 98 (06-27-22 @ 16:14), Max: 98.5 (06-26-22 @ 18:24)  HR: 81 (06-27-22 @ 16:14) (72 - 84)  BP: 146/79 (06-27-22 @ 16:14) (139/80 - 166/68)  RR:  (15 - 22)  SpO2:  (95% - 98%)  Wt(kg): --  GENERAL: NAD, well-groomed, well-developed  EYES: No proptosis, no lid lag  HEENT:  Atraumatic, Normocephalic  THYROID: Normal size, no palpable nodules  RESPIRATORY: Clear to auscultation bilaterally; No rales, rhonchi, wheezing  CARDIOVASCULAR: Si S2, No murmurs;  GI: Soft, non distended, normal bowel sounds  SKIN: Dry, intact, No rashes or lesions  MUSCULOSKELETAL: Has BL lower extremity edema.  NEURO:  no tremor, sensation decreased in feet BL,    POCT Blood Glucose.: 358 mg/dL (06-27-22 @ 11:28)  POCT Blood Glucose.: 171 mg/dL (06-27-22 @ 09:00)  POCT Blood Glucose.: 141 mg/dL (06-26-22 @ 18:21)  POCT Blood Glucose.: 199 mg/dL (06-26-22 @ 12:26)                            10.3   9.48  )-----------( 151      ( 27 Jun 2022 02:27 )             33.2       06-27    138  |  103  |  28<H>  ----------------------------<  147<H>  4.3   |  24  |  0.68    eGFR: 84    Ca    8.8      06-27    TPro  6.6  /  Alb  3.5  /  TBili  0.3  /  DBili  x   /  AST  13  /  ALT  13  /  AlkPhos  57  06-27      Thyroid Function Tests:              Radiology:

## 2022-06-27 NOTE — CONSULT NOTE ADULT - ASSESSMENT
Assessment  DMT2: 88y Female with DM T2 with hyperglycemia, A1C?, was on oral meds/Janumet at home, now on insulin coverage, had elevated FS this afternoon, blood sugars trending within otherwise acceptable range, no hypoglycemic episodes.  Syncope: workup in progress, monitored.  HTN: Controlled,  on antihypertensive medications.  HLD: On statin.      Jaspal Augustine MD  Cell: [phone]  Office: [phone]       Assessment  DMT2: 88y Female with DM T2 with hyperglycemia, A1C?, was on oral meds/Janumet at home, now on insulin coverage, had elevated FS this afternoon,  blood sugars trending within otherwise acceptable range, no hypoglycemic episodes.  Syncope: workup in progress, monitored.  HTN: Controlled,  on antihypertensive medications.  HLD: On statin.      Jaspal Augustine MD  Cell: [phone]  Office: [phone]

## 2022-06-27 NOTE — ED ADULT NURSE REASSESSMENT NOTE - NS ED NURSE REASSESS COMMENT FT1
Patient resting comfortably, breathing unlabored, VSS, no signs of acute distress, no requests at this time, plan of care explained, meds and insulin administered, side rails raised, son at bedside, comfort and safety maintained.

## 2022-06-27 NOTE — PATIENT PROFILE ADULT - FALL HARM RISK - HARM RISK INTERVENTIONS

## 2022-06-27 NOTE — PHYSICAL THERAPY INITIAL EVALUATION ADULT - PERTINENT HX OF CURRENT PROBLEM, REHAB EVAL
88 year old RH female h/o HTN, HLD, DM, CAD with stents who is presenting as a transfer from [place] for evaluation of possible seizure episode./  and  s/p  syncop LOC episode with prodrome of darkening of vision, sweating, nausea with history of  cad, need  to  r/o  cardiac causes HCT: (-) CXR: (-)

## 2022-06-27 NOTE — ED PROVIDER NOTE - CADM POA URETHRAL CATHETER
Letter by Heidi Hernandez DO at      Author: Heidi Hernandez DO Service: -- Author Type: --    Filed:  Encounter Date: 3/29/2019 Status: (Other)         March 29, 2019     Patient: Indra Jason   YOB: 1963   Date of Visit: 3/29/2019       To Whom It May Concern:    It is my medical opinion that Indra Jason Is not feeling well and may be unsafe to be home alone tonight. I saw her in clinic today and did a treatment and she should be monitored. Please excuse her son in law Amandeep Delcid from work 3/29/19 to take care of her.     If you have any questions or concerns, please don't hesitate to call.    Sincerely,        Electronically signed by Heidi Hernandez DO        No

## 2022-06-27 NOTE — CONSULT NOTE ADULT - SUBJECTIVE AND OBJECTIVE BOX
HPI:  Karen Gomez is an 88 year old RH female with a past medical history of HTN, HLD, DM, CAD with stents who is presenting as a transfer from [place] for evaluation of possible seizure episode. At baseline, the patient is ambulatory with a cane when she goes outside, lives alone and performs her own ADLs. The patient had an episode of nosebleeding for which she first presented to Urgent Care and then went to [facility] ED where her bleeding spontaneously stopped and she was discharged home. She then went home and was sitting down and eating a pastry when she told her daughter who was present that the patient felt like she was going to faint. The patient stated she had symptoms of her vision darkening, a symptom of epigastric discomfort and nausea, sweating and then had lost consciousness. As per the daughter, the patient began to lean back, her eyes rolled back, and to avoid falling backwards and vomiting was pushed forward by the daughter. The episode lasted several seconds and then the patient regained consciousness. EMS was called. The patient then seemed slightly disoriented  which lasted several minutes and had completely resolved by the time that EMS had arrived. It was noted that there was some liquid by the patient which was concerning for urinary incontinence however it unclear as the patient stated that her undergarments and dress were dry. The patient was then brought to the ED at [place] and was to undergo cardiac workup and was transferred to [facility] as there was no EEG monitoring. Currently, the patient states that she feels well and denied headaches, visual changes, auditory changes, numbness, tingling, weakness.  Patient notes that in the past she had a fall several months ago while she was ambulating and thinks that she may have lost consciousness during that time.  Patient stated that in the past when she was 10 years old, she was diagnosed with seizures. These episodes were marked by not being able to speak for a brief period of time and disorientation. Stated that she feels that she might remember these episodes but was unsure. She denied taking medications for these events and noted that when she underwent menarche, these episodes stopped and she has not had any since.  Is taking Wellbutrin and has been taking the same dose for 16 years as per daughter.     NIHSS: 0  MRS: 0    REVIEW OF SYSTEMS    A 10-system ROS was performed and is negative except for those items noted above and/or in the HPI.    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  Lives alone    MEDICATIONS (HOME):  Home Medications:  allopurinol 100 mg oral tablet: 1 tab(s) orally once a day (2022 11:46)  amLODIPine 2.5 mg oral tablet: 1 tab(s) orally once a day (at bedtime) (2022 11:46)  Aspir 81 oral delayed release tablet: 1 tab(s) orally once a day (2022 11:46)  atorvastatin 40 mg oral tablet: 1 tab(s) orally once a day (2022 11:46)  buPROPion 150 mg/24 hours (XL) oral tablet, extended release: 1 tab(s) orally every 24 hours (2022 11:46)  Citracal + D: orally once a day (2022 11:46)  clopidogrel 75 mg oral tablet: 1 tab(s) orally once a day (2022 11:46)  Janumet 50 mg-1000 mg oral tablet: 1 tab(s) orally 2 times a day (2022 11:46)  Lexapro 10 mg oral tablet: 1 tab(s) orally once a day (2022 11:46)  lisinopril 10 mg oral tablet: 1 tab(s) orally once a day (2022 11:46)  metoprolol succinate 50 mg oral tablet, extended release: 1 tab(s) orally once a day (2022 11:46)  Vitamin B12: orally once a day (2022 11:46)    MEDICATIONS  (STANDING):  sodium chloride 0.9% Bolus 1000 milliLiter(s) IV Bolus once    ALLERGIES/INTOLERANCES:  Allergies  No Known Allergies    Intolerances    VITALS & EXAMINATION:  Vital Signs Last 24 Hrs  T(C): 36.9 (2022 01:10), Max: 36.9 (2022 11:38)  T(F): 98.4 (2022 01:10), Max: 98.5 (2022 18:24)  HR: 77 (2022 01:10) (76 - 89)  BP: 148/74 (2022 01:10) (105/65 - 198/96)  BP(mean): --  RR: 18 (2022 01:10) (14 - 24)  SpO2: 96% (2022 01:10) (94% - 97%)    General:  Constitutional: Appears stated age, in no apparent distress including pain  Head: Normocephalic & atraumatic.  ENT: No evidence of a tongue laceration.     Neurological (>12):  MS: Awake, alert, oriented to person, place, situation. Normal affect. Follows all commands.    Language: Speech is clear, fluent with good repetition    CNs: PERRL (R = 4mm, L = 4mm), sluggish bilaterally. VF intact in all 4 quadrants. EOMI no nystagmus. V1-3 intact to LT, well developed masseter muscles b/l. No facial asymmetry b/l, full eye closure strength b/l. Symmetric palate elevation in midline. Shoulder shrug intact b/l. Tongue midline, normal movements, no atrophy.    Motor: Normal muscle bulk & tone. No noticeable tremor or seizure. No pronator drift.              Deltoid	Biceps	Triceps	   R	5	5	5	5	  L	5	5	5	5	    	H-Flex	H-Ext	K-Flex	K-Ext	D-Flex	P-Flex  R	5	5	5	5	5	5		   L	5	5	5	5	5	5		     Sensation: Intact to LT b/l throughout.     Reflexes:              Biceps(C5)       BR(C6)     Triceps(C7)               Patellar(L4)    Achilles(S1)    Plantar Resp  R	2	          2	             2		        1		    1		Down   L	2	          2	             2		        1		    1		Down     Coordination: No dysmetria to FTN    Gait: Deferred    LABORATORY:  CBC                       11.5   9.01  )-----------( 169      ( 2022 14:30 )             37.3     Chem 06-26    138  |  104  |  29<H>  ----------------------------<  180<H>  4.5   |  30  |  0.91    Ca    9.1      2022 14:30    TPro  7.2  /  Alb  3.1<L>  /  TBili  0.5  /  DBili  x   /  AST  12<L>  /  ALT  17  /  AlkPhos  59  06-26    LFTs LIVER FUNCTIONS - ( 2022 14:30 )  Alb: 3.1 g/dL / Pro: 7.2 g/dL / ALK PHOS: 59 U/L / ALT: 17 U/L / AST: 12 U/L / GGT: x           Coagulopathy PT/INR - ( 2022 14:30 )   PT: 12.5 sec;   INR: 1.07 ratio         PTT - ( 2022 14:30 )  PTT:23.7 sec  Lipid Panel   A1c   Cardiac enzymes CARDIAC MARKERS ( 2022 14:30 )  x     / x     / 56 U/L / x     / 2.4 ng/mL      U/A Urinalysis Basic - ( 2022 20:13 )    Color: Yellow / Appearance: Clear / S.010 / pH: x  Gluc: x / Ketone: Negative  / Bili: Negative / Urobili: Negative   Blood: x / Protein: Negative / Nitrite: Negative   Leuk Esterase: Moderate / RBC: 3-5 /HPF / WBC 11-25   Sq Epi: x / Non Sq Epi: Moderate / Bacteria: Moderate      STUDIES & IMAGING:    Radiology (XR, CT, MR, U/S, TTE/HUMZA):    CT Head No Cont (22 @ 17:14)  IMPRESSION:  No hydrocephalus, acute intracranial hemorrhage, or mass effect.     HPI:  Karen Gomez is an 88 year old RH female with a past medical history of HTN, HLD, DM, CAD with stents who is presenting as a transfer from [place] for evaluation of possible seizure episode. At baseline, the patient is ambulatory with a cane when she goes outside, lives alone and performs her own ADLs. The patient had an episode of nosebleeding for which she first presented to Urgent Care and then went to [facility] ED where her bleeding spontaneously stopped and she was discharged home. She then went home and was sitting down and eating a pastry when she told her daughter who was present that the patient felt like she was going to faint. The patient stated she had symptoms of her vision darkening, a symptom of epigastric discomfort and nausea, sweating and then had lost consciousness. As per the daughter, the patient began to lean back, her eyes rolled back, and to avoid falling backwards and vomiting was pushed forward by the daughter. The episode lasted several seconds and then the patient regained consciousness. EMS was called. The patient then seemed slightly disoriented  which lasted several minutes and had completely resolved by the time that EMS had arrived. It was noted that there was some liquid by the patient which was concerning for urinary incontinence however it unclear as the patient stated that her undergarments and dress were dry. The patient was then brought to the ED at [place] and was to undergo cardiac workup and was transferred to [facility] as there was no EEG monitoring. Currently, the patient states that she feels well and denied headaches, visual changes, auditory changes, numbness, tingling, weakness.  Patient notes that in the past she had a fall several months ago while she was ambulating and thinks that she may have lost consciousness during that time.  Patient stated that in the past when she was 10 years old, she was diagnosed with seizures. These episodes were marked by not being able to speak for a brief period of time and disorientation. Stated that she feels that she might remember these episodes but was unsure. She denied taking medications for these events and noted that when she underwent menarche, these episodes stopped and she has not had any since.  Is taking Wellbutrin and has been taking the same dose for 16 years as per daughter.     NIHSS: 0  MRS: 0    REVIEW OF SYSTEMS    A 10-system ROS was performed and is negative except for those items noted above and/or in the HPI.    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  Lives alone    MEDICATIONS (HOME):  Home Medications:  allopurinol 100 mg oral tablet: 1 tab(s) orally once a day (2022 11:46)  amLODIPine 2.5 mg oral tablet: 1 tab(s) orally once a day (at bedtime) (2022 11:46)  Aspir 81 oral delayed release tablet: 1 tab(s) orally once a day (2022 11:46)  atorvastatin 40 mg oral tablet: 1 tab(s) orally once a day (2022 11:46)  buPROPion 150 mg/24 hours (XL) oral tablet, extended release: 1 tab(s) orally every 24 hours (2022 11:46)  Citracal + D: orally once a day (2022 11:46)  clopidogrel 75 mg oral tablet: 1 tab(s) orally once a day (2022 11:46)  Janumet 50 mg-1000 mg oral tablet: 1 tab(s) orally 2 times a day (2022 11:46)  Lexapro 10 mg oral tablet: 1 tab(s) orally once a day (2022 11:46)  lisinopril 10 mg oral tablet: 1 tab(s) orally once a day (2022 11:46)  metoprolol succinate 50 mg oral tablet, extended release: 1 tab(s) orally once a day (2022 11:46)  Vitamin B12: orally once a day (2022 11:46)    MEDICATIONS  (STANDING):  sodium chloride 0.9% Bolus 1000 milliLiter(s) IV Bolus once    ALLERGIES/INTOLERANCES:  Allergies  No Known Allergies    Intolerances    VITALS & EXAMINATION:  Vital Signs Last 24 Hrs  T(C): 36.9 (2022 01:10), Max: 36.9 (2022 11:38)  T(F): 98.4 (2022 01:10), Max: 98.5 (2022 18:24)  HR: 77 (2022 01:10) (76 - 89)  BP: 148/74 (2022 01:10) (105/65 - 198/96)  BP(mean): --  RR: 18 (2022 01:10) (14 - 24)  SpO2: 96% (2022 01:10) (94% - 97%)    General:  Constitutional: Appears stated age, in no apparent distress including pain  Head: Normocephalic & atraumatic.  ENT: No evidence of a tongue laceration.  CV: Peripheral pulses palpable, no edema  Eyes: Fundoscopy not well visualized    Neurological (>12):  MS: Awake, alert, oriented to person, place, situation. Normal affect. Follows all commands.    Language: Speech is clear, fluent with good repetition    CNs: PERRL (R = 4mm, L = 4mm), sluggish bilaterally. VF intact in all 4 quadrants. EOMI no nystagmus. V1-3 intact to LT, well developed masseter muscles b/l. No facial asymmetry b/l, full eye closure strength b/l. Symmetric palate elevation in midline. Shoulder shrug intact b/l. Tongue midline, normal movements, no atrophy.    Motor: Normal muscle bulk & tone. No noticeable tremor or seizure. No pronator drift.              Deltoid	Biceps	Triceps	   R	5	5	5	5	  L	5	5	5	5	    	H-Flex	H-Ext	K-Flex	K-Ext	D-Flex	P-Flex  R	5	5	5	5	5	5		   L	5	5	5	5	5	5		     Sensation: Intact to LT b/l throughout.     Reflexes:              Biceps(C5)       BR(C6)     Triceps(C7)               Patellar(L4)    Achilles(S1)    Plantar Resp  R	2	          2	             2		        1		    1		Down   L	2	          2	             2		        1		    1		Down     Coordination: No dysmetria to FTN    Gait: Deferred    LABORATORY:  CBC                       11.5   9.01  )-----------( 169      ( 2022 14:30 )             37.3     Chem 06-26    138  |  104  |  29<H>  ----------------------------<  180<H>  4.5   |  30  |  0.91    Ca    9.1      2022 14:30    TPro  7.2  /  Alb  3.1<L>  /  TBili  0.5  /  DBili  x   /  AST  12<L>  /  ALT  17  /  AlkPhos  59  06-26    LFTs LIVER FUNCTIONS - ( 2022 14:30 )  Alb: 3.1 g/dL / Pro: 7.2 g/dL / ALK PHOS: 59 U/L / ALT: 17 U/L / AST: 12 U/L / GGT: x           Coagulopathy PT/INR - ( 2022 14:30 )   PT: 12.5 sec;   INR: 1.07 ratio         PTT - ( 2022 14:30 )  PTT:23.7 sec  Lipid Panel   A1c   Cardiac enzymes CARDIAC MARKERS ( 2022 14:30 )  x     / x     / 56 U/L / x     / 2.4 ng/mL      U/A Urinalysis Basic - ( 2022 20:13 )    Color: Yellow / Appearance: Clear / S.010 / pH: x  Gluc: x / Ketone: Negative  / Bili: Negative / Urobili: Negative   Blood: x / Protein: Negative / Nitrite: Negative   Leuk Esterase: Moderate / RBC: 3-5 /HPF / WBC 11-25   Sq Epi: x / Non Sq Epi: Moderate / Bacteria: Moderate      STUDIES & IMAGING:    Radiology (XR, CT, MR, U/S, TTE/HUMZA):    CT Head No Cont (22 @ 17:14)  IMPRESSION:  No hydrocephalus, acute intracranial hemorrhage, or mass effect.

## 2022-06-27 NOTE — CONSULT NOTE ADULT - ASSESSMENT
88 year old RH female with a past medical history of HTN, HLD, DM, CAD with stents who is presenting as a transfer from [place] for evaluation of possible seizure episode.    At baseline, the patient is ambulatory with a cane when she goes outside, lives alone and performs her own ADLs. The patient had an episode of nose bleeding for which she first presented to Urgent Care and then went to [facility] ED where her bleeding spontaneously stopped and she was discharged home. She then went home and was sitting down and eating a pastry when she told her daughter who was present that the patient felt like she was going to faint. The patient stated she had symptoms of her vision darkening, a symptom of epigastric discomfort and nausea, sweating and then had lost consciousness. As per the daughter, the patient began to lean back, her eyes rolled back, and to avoid falling backwards and vomiting was pushed forward by the daughter. The episode lasted several seconds and then the patient regained consciousness. EMS was called.    The patient then seemed slightly disoriented  which lasted several minutes and had completely resolved by the time that EMS had arrived. It was noted that there was some liquid by the patient which was concerning for urinary incontinence however it unclear as the patient stated that her undergarments and dress were dry. The patient was then brought to the ED at [place] and was to undergo cardiac workup and was transferred to [facility] as there was no EEG monitoring. Currently, the patient states that she feels well and denied headaches, visual changes, auditory changes, numbness, tingling, weakness.  Patient notes that in the past she had a fall several months ago while she was ambulating and thinks that she may have lost consciousness during that time.  Patient stated that in the past when she was 10 years old, she was diagnosed with seizures. These episodes were marked by not being able to speak for a brief period of time and disorientation. Stated that she feels that she might remember these episodes but was unsure. She denied taking medications for these events and noted that when she underwent menarche, these episodes stopped and she has not had any since.  Is taking Wellbutrin and has been taking the same dose for 16 years as per daughter.   pt with sig cardiac hx s/p multiple stents ?new LBBB  tele  check orthostatic  echo  may need ischemia goldstein if not done recently  will adjust cardiac meds

## 2022-06-27 NOTE — H&P ADULT - ASSESSMENT
88 year old RH female      h/o HTN, HLD, DM, CAD with stents     who is presenting as a transfer from [place] for evaluation of possible seizure episode./  and  s/p  syncope   LOC episode with prodrome of darkening of vision, sweating, nausea with history of  cad, need  to  r/o  cardiac  causes   per  neuro,  no  seizures meds    tele.  r/o  arrythmia   echo/  card  eval  d r golyan   orthostatics   HYN/ HLD  on  asa.  lisinoril, norvasc   CAD/  stents, on asa./ plavix/  lipitor   DM,  follow  fson  janumet   depression, on bupropion,  lexapro  on  dvt  ppx

## 2022-06-27 NOTE — CONSULT NOTE ADULT - SUBJECTIVE AND OBJECTIVE BOX
E L E C T R O  P H Y S I O L O G Y     CONSULTATION NOTE   ________________________________________________      CHIEF COMPLAINT:Patient is a 88y old  Female who presents with a chief complaint of syncope/ ?  seizures (27 Jun 2022 07:07)      HPI:  88 year old RH femalewith a past medical history of HTN, HLD, DM, CAD with stentswho is presenting as a transfer from [place] for evaluation of possible seizure episode.    At baseline, the patient is ambulatory with a cane when she goes outside, lives alone and performs her own ADLs. The patient had an episode of nosebleeding for which she first presented to Urgent Care and then went to [facility] ED where her bleeding spontaneously stopped and she was discharged home. She then went home and was sitting down and eating a pastry when she told her daughter who was present that the patient felt like she was going to faint. The patient stated she had symptoms of her vision darkening, a symptom of epigastric discomfort and nausea, sweating and then had lost consciousness. As per the daughter, the patient began to lean back, her eyes rolled back, and to avoid falling backwards and vomiting was pushed forward by the daughter. The episode lasted several seconds and then the patient regained consciousness. EMS was called.    The patient then seemed slightly disoriented  which lasted several minutes and had completely resolved by the time that EMS had arrived. It was noted that there was some liquid by the patient which was concerning for urinary incontinence however it unclear as the patient stated that her undergarments and dress were dry. The patient was then brought to the ED at [place] and was to undergo cardiac workup and was transferred to [facility] as there was no EEG monitoring. Currently, the patient states that she feels well and denied headaches, visual changes, auditory changes, numbness, tingling, weakness.  Patient notes that in the past she had a fall several months ago while she was ambulating and thinks that she may have lost consciousness during that time.  Patient stated that in the past when she was 10 years old, she was diagnosed with seizures. These episodes were marked by not being able to speak for a brief period of time and disorientation. Stated that she feels that she might remember these episodes but was unsure. She denied taking medications for these events and noted that when she underwent menarche, these episodes stopped and she has not had any since.  Is taking Wellbutrin and has been taking the same dose for 16 years as per daughter.       PAST MEDICAL & SURGICAL HISTORY:  Diabetes      Hypertension, unspecified type      Stented coronary artery      Hyperlipidemia, unspecified hyperlipidemia type          MEDICATIONS  (STANDING):  allopurinol 100 milliGRAM(s) Oral daily  aspirin enteric coated 81 milliGRAM(s) Oral daily  atorvastatin 40 milliGRAM(s) Oral at bedtime  buPROPion XL (24-Hour) . 150 milliGRAM(s) Oral daily  clopidogrel Tablet 75 milliGRAM(s) Oral daily  dextrose 5%. 1000 milliLiter(s) (100 mL/Hr) IV Continuous <Continuous>  dextrose 5%. 1000 milliLiter(s) (50 mL/Hr) IV Continuous <Continuous>  dextrose 50% Injectable 25 Gram(s) IV Push once  dextrose 50% Injectable 12.5 Gram(s) IV Push once  dextrose 50% Injectable 25 Gram(s) IV Push once  escitalopram 10 milliGRAM(s) Oral daily  glucagon  Injectable 1 milliGRAM(s) IntraMuscular once  heparin   Injectable 5000 Unit(s) SubCutaneous every 12 hours  insulin lispro (ADMELOG) corrective regimen sliding scale   SubCutaneous three times a day before meals  lisinopril 10 milliGRAM(s) Oral daily  metoprolol succinate ER 50 milliGRAM(s) Oral daily    MEDICATIONS  (PRN):  dextrose Oral Gel 15 Gram(s) Oral once PRN Blood Glucose LESS THAN 70 milliGRAM(s)/deciliter      FAMILY HISTORY:      SOCIAL HISTORY:    [x ] Non-smoker  [ ] Smoker  [ ] Alcohol    Allergies    No Known Allergies    Intolerances    	    REVIEW OF SYSTEMS:  CONSTITUTIONAL: No fever, weight loss, or fatigue  EYES: No eye pain, visual disturbances, or discharge  ENT:  No difficulty hearing, tinnitus, vertigo; No sinus or throat pain  NECK: No pain or stiffness  RESPIRATORY: No cough, wheezing, chills or hemoptysis; + Shortness of Breath  CARDIOVASCULAR: No chest pain, palpitations, passing out, dizziness, or leg swelling  GASTROINTESTINAL: No abdominal or epigastric pain. No nausea, vomiting, or hematemesis; No diarrhea or constipation. No melena or hematochezia.  GENITOURINARY: No dysuria, frequency, hematuria, or incontinence  NEUROLOGICAL: No headaches, memory loss, loss of strength, numbness, or tremors  SKIN: No itching, burning, rashes, or lesions   LYMPH Nodes: No enlarged glands  ENDOCRINE: No heat or cold intolerance; No hair loss  MUSCULOSKELETAL: No joint pain or swelling; No muscle, back, or extremity pain  PSYCHIATRIC: No depression, anxiety, mood swings, or difficulty sleeping  HEME/LYMPH: No easy bruising, or bleeding gums  ALLERGY AND IMMUNOLOGIC: No hives or eczema	    [ ] All others negative	  [ ] Unable to obtain    PHYSICAL EXAM:  T(C): 36.4 (06-27-22 @ 07:28), Max: 36.9 (06-26-22 @ 11:38)  HR: 75 (06-27-22 @ 07:28) (72 - 89)  BP: 148/64 (06-27-22 @ 07:28) (105/65 - 198/96)  RR: 15 (06-27-22 @ 07:28) (14 - 24)  SpO2: 98% (06-27-22 @ 07:28) (94% - 98%)  Wt(kg): --  I&O's Summary      Appearance: Normal	  HEENT:   Normal oral mucosa, PERRL, EOMI	  Lymphatic: No lymphadenopathy  Cardiovascular: Normal S1 S2, No JVD, + murmurs, No edema  Respiratory: rhonchi  Psychiatry: A & O x 3, Mood & affect appropriate  Gastrointestinal:  Soft, Non-tender, + BS	  Skin: No rashes, No ecchymoses, No cyanosis	  Neurologic: Non-focal  Extremities: Normal range of motion, No clubbing, cyanosis or edema  Vascular: Peripheral pulses palpable 2+ bilaterally    TELEMETRY: 	    ECG:  	  RADIOLOGY:  OTHER: 	  	  LABS:	 	    CARDIAC MARKERS:  CARDIAC MARKERS ( 26 Jun 2022 14:30 )  x     / x     / 56 U/L / x     / 2.4 ng/mL                              10.3   9.48  )-----------( 151      ( 27 Jun 2022 02:27 )             33.2     06-27    138  |  103  |  28<H>  ----------------------------<  147<H>  4.3   |  24  |  0.68    Ca    8.8      27 Jun 2022 02:27    TPro  6.6  /  Alb  3.5  /  TBili  0.3  /  DBili  x   /  AST  13  /  ALT  13  /  AlkPhos  57  06-27    proBNP:   Lipid Profile:   HgA1c:   TSH:     PREVIOUS DIAGNOSTIC TESTING:    < from: 12 Lead ECG (06.26.22 @ 14:17) >  Diagnosis Line Normal sinus rhythm  Left bundle branch block  Abnormal ECG  No previous ECGs available      < from: CT Head No Cont (06.26.22 @ 17:14) >  No hydrocephalus, acute intracranial hemorrhage, or mass effect.      < from: Xray Chest 1 View- PORTABLE-Urgent (Xray Chest 1 View- PORTABLE-Urgent .) (06.27.22 @ 03:34) >  Heart size cannot be appropriately assessed in this projection.  No focal consolidation.  No pleural effusion or pneumothorax.  No acute osseous abnormalities.    IMPRESSION:  No focal consolidation.

## 2022-06-27 NOTE — ED ADULT NURSE REASSESSMENT NOTE - NS ED NURSE REASSESS COMMENT FT1
Report received from ANDREZ Jeffrey in [facility]. Pt remains in the ED. Resting comfortably in bed. A&Ox3. Breathing spontaneously and unlabored. NAD. VSS. On cardiac monitor, NSR. Pt safety maintained. Will continue to monitor. Awaiting bed.

## 2022-06-27 NOTE — CONSULT NOTE ADULT - ATTENDING COMMENTS
HPI as per resident note, personally verified by me. Patient with episode of LOC and prodrome of tunnel vision, diaphoresis, and nausea with eyes rolling back. No convulsions seen and there may have been urinary incontinence but unclear. Unclear if tongue bite. Lasted seconds and she may have been confused initially when regaining consciousness but back to baseline within minutes. No further episodes and no reports of focal neurologic deficits.    MS: Awake, alert, oriented to person, place, situation. Normal affect. Follows all commands. Attn/conc, recent and remote memory, fund of knowledge WNL    Language: Speech is clear, fluent with good repetition and naming    CNs: PERRL (R = 4mm, L = 4mm), sluggish bilaterally. VF intact in all 4 quadrants. EOMI no nystagmus. V1-3 intact to LT, well developed masseter muscles b/l. No facial asymmetry b/l, full eye closure strength b/l. Hearing dec on R and intact on L. Symmetric palate elevation in midline. Shoulder shrug intact b/l. Tongue midline, normal movements, no atrophy. Questionable R anterolateral tongue laceration noted    Motor: Normal muscle bulk & tone. No noticeable tremor or seizure. No pronator drift.              Deltoid	Biceps	Triceps	   R	5	5	5	5	  L	5	5	5	5	    	H-Flex	H-Ext	K-Flex	K-Ext	D-Flex	P-Flex  R	5	5	5	5	5	5		   L	5	5	5	5	5	5		     Sensation: Intact to LT b/l throughout.     Reflexes:              Biceps(C5)       BR(C6)     Triceps(C7)               Patellar(L4)    Achilles(S1)    Plantar Resp  R	2	          2	             2		        1		    1	Down   L	2	          2	             2		        1		    1	Down     Coordination: No dysmetria to FTN. LUE > RUE intention tremor with postural component on the R    Gait and station: Due to fall risk/safety concerns did not assess    A/P:  Syncope  HTN  CAD  DM type 2  Depression on Wellbutrin    - Etiology for event seems more consistent with syncope, likely secondary to cardiac/peripheral cause, as opposed to neurologic (seizure or cerebrovascular) in nature. Head imaging is unrevealing and no focal neurologic deficits on exam. She had seizures in childhood but seemed most consistent with childhood absence seizure and these typically completely resolve in adolescence/early adulthood (by reports no seizures since teenage years). Urinary incontinence is non-specific. Questionable tongue bit could indicate seizure but those are typically more lateral or even posterolateral as opposed to anterolateral. She is on Wellbutrin and this can be epileptogenic but no recent dose changes and she has not been symptomatic on this for quite some time. Has been having significant nosebleed recently as well so query if related to transient hemodynamic instability from this  - vEEG to evaluate for focal slowing, epileptiform discharge, or seizures  - No need for AED's at this time but will reconsider based on EEG results and further clinical course  - Cardiac work-up, including TTE and orthostatic vital signs  - Continue to address above medical problems, as you are doing  - Will continue to follow patient with you

## 2022-06-27 NOTE — CONSULT NOTE ADULT - ASSESSMENT
89 yo female with PMHX DM, HTN, HLD, CAD present to ED s/p syncopal episode yesterday.    - episode most likely vagally mediated, with epistaxis earlier in the day  - that being said, patient with LBBB (of unclear chronicity) and reported CAD  - watch on telemetry  - echocardiogram  - orthostatics  - need to obtain prior cardiac history, and may end up needing a pharm stress during the admission  - ep evaluation called for syncope and LBBB  - neurologic evaluation in progress  - cont asa, plavix (remains on dapt?) and statin  - cont metoprolol and lisinopril  - trend creatinine and electrolytes. Keep K>4, mg>2  - will follow with you

## 2022-06-27 NOTE — ED PROVIDER NOTE - PHYSICAL EXAMINATION
PHYSICAL EXAM:  GENERAL: non-toxic appearing; in no respiratory distress  HEAD Atraumatic, Normocephalic  ENT: no tongue laceration  NECK: No JVD; trachea midline  EYES: PERRL, EOMs intact b/l w/out deficits; normal conjunctiva  CHEST/LUNG: CTAB no wheezes/rhonchi/rales  HEART: RRR no murmur/gallops/rubs  ABDOMEN: soft, NT, ND  EXTREMITIES: No LE edema, +2 radial pulses b/l, +2 DP/PT pulses b/l  MUSCULOSKELETAL: FROM of all 4 extremities;  NERVOUS SYSTEM:  A&Ox3, No motor deficits or sensory deficits; CNII-XII intact; Speech is fluent and appropriate  SKIN:  Warm and dry as visualized

## 2022-06-27 NOTE — H&P ADULT - HISTORY OF PRESENT ILLNESS
88 year old RH female     with a past medical history of HTN, HLD, DM, CAD with stents     who is presenting as a transfer from [place] for evaluation of possible seizure episode.    At baseline, the patient is ambulatory with a cane when she goes outside, lives alone and performs her own ADLs. The patient had an episode of nosebleeding for which she first presented to Urgent Care and then went to [facility] ED where her bleeding spontaneously stopped and she was discharged home. She then went home and was sitting down and eating a pastry when she told her daughter who was present that the patient felt like she was going to faint. The patient stated she had symptoms of her vision darkening, a symptom of epigastric discomfort and nausea, sweating and then had lost consciousness. As per the daughter, the patient began to lean back, her eyes rolled back, and to avoid falling backwards and vomiting was pushed forward by the daughter. The episode lasted several seconds and then the patient regained consciousness. EMS was called.    The patient then seemed slightly disoriented  which lasted several minutes and had completely resolved by the time that EMS had arrived. It was noted that there was some liquid by the patient which was concerning for urinary incontinence however it unclear as the patient stated that her undergarments and dress were dry. The patient was then brought to the ED at [place] and was to undergo cardiac workup and was transferred to [facility] as there was no EEG monitoring. Currently, the patient states that she feels well and denied headaches, visual changes, auditory changes, numbness, tingling, weakness.  Patient notes that in the past she had a fall several months ago while she was ambulating and thinks that she may have lost consciousness during that time.  Patient stated that in the past when she was 10 years old, she was diagnosed with seizures. These episodes were marked by not being able to speak for a brief period of time and disorientation. Stated that she feels that she might remember these episodes but was unsure. She denied taking medications for these events and noted that when she underwent menarche, these episodes stopped and she has not had any since.  Is taking Wellbutrin and has been taking the same dose for 16 years as per daughter.     NIHSS: 0  MRS: 0

## 2022-06-27 NOTE — ED PROVIDER NOTE - CLINICAL SUMMARY MEDICAL DECISION MAKING FREE TEXT BOX
pt presents for syncopal episode vs seizure. transferred from [place]. no prodromal state prior to sy ncope and pt does not recall. possible cardiac event vs arrhythmia. will consult neuro as well. will recheck labs, ekg, cxr, likely plan to admit pt presents for syncopal episode vs seizure. transferred from [place]. no prodromal state prior to sy ncope and pt does not recall. possible cardiac event vs arrhythmia. will consult neuro as well. will recheck labs, ekg, cxr, likely plan to admit    NEIL Gomez, Attending: seen with resident and helped create clinical plan.    80sF HTN, DM, HLD, CAD, had seizures as a childhood prior to menarche, presents as a transfer from [place] for neurology evaluation. No report of major trauma. No fevers.     Looks well. Non toxic. Vitals reassuring. No signs of status. RRR. Distal pulse intact.    Collateral obtained by neuro. Story sounds more like syncope with AMS. No trauma per family or actual tonic clonic activity. Given age, LBBB, and risk factors will admit on tele for syncope workup. Neuro will follow inpatient for comprehensive eval of possible seizures, although less likely.

## 2022-06-27 NOTE — ED PROVIDER NOTE - OBJECTIVE STATEMENT
89 yo F PMHx HTN, DM, HLD, CAD, had seizures as a childhood prior to menarche, presents as a transfer from [place] for neurology evaluation. Pt was in her usual state of health until yesterday afernoon, where she had a possible seizure vs syncope. She states she ate lunch, then felt unwell, went to the couch, and then the next thing she knew her daughter called 911. There was reports that she had tongue biting and urinary incontinence. She denies antecedent palpitations, cp, sob, abd pain, headaches. [facility] where the initial plan was to admit to telemetry for syncope w/u however, there was concern for seizures and they did not have EEG there and thus, was transferred here. Currently, pt feels fine.

## 2022-06-27 NOTE — ED PROVIDER NOTE - NS ED ROS FT
Constitutional: no fevers; no chills  HEENT: no visual changes, no sore throat, no rhinorrhea  CV: no cp; no palpitations; syncope  Resp: no sob; no cough  GI: no abd pain, no nausea, no vomiting, no diarrhea, no constipation  : no dysuria, no hematuria  MSK: no myalgias; no arthralgias  skin: no rashes  neuro: no HA, no numbness; no weakness, no tingling  endo: no polyuria, no polydipsia

## 2022-06-27 NOTE — H&P ADULT - NSHPPHYSICALEXAM_GEN_ALL_CORE
PHYSICAL EXAMINATION:  Vital Signs Last 24 Hrs  T(C): 36.7 (27 Jun 2022 05:30), Max: 36.9 (26 Jun 2022 11:38)  T(F): 98 (27 Jun 2022 05:30), Max: 98.5 (26 Jun 2022 18:24)  HR: 72 (27 Jun 2022 05:30) (72 - 89)  BP: 166/68 (27 Jun 2022 05:30) (105/65 - 198/96)  BP(mean): --  RR: 18 (27 Jun 2022 05:30) (14 - 24)  SpO2: 98% (27 Jun 2022 05:30) (94% - 98%)  CAPILLARY BLOOD GLUCOSE      POCT Blood Glucose.: 141 mg/dL (26 Jun 2022 18:21)  POCT Blood Glucose.: 199 mg/dL (26 Jun 2022 12:26)        GENERAL: NAD, well-groomed,  HEAD:  atraumatic, normocephalic  EYES: sclera anicteric  ENMT: mucous membranes moist  NECK: supple, No JVD  CHEST/LUNG: clear to auscultation bilaterally;    no      rales   ,   no rhonchi,   HEART: normal S1, S2  ABDOMEN: BS+, soft, ND, NT   EXTREMITIES:    no    edema    b/l LEs  NEURO: awake, ,     moves all extremities  SKIN: no     rash

## 2022-06-27 NOTE — ED ADULT NURSE REASSESSMENT NOTE - NS ED NURSE REASSESS COMMENT FT1
pt awaiting transfer to [facility], resting comfortably with family at bedside, call bell within reach, no acute distress noted

## 2022-06-27 NOTE — ED ADULT NURSE NOTE - OBJECTIVE STATEMENT
88 y.o female BIBEMS transferred from [place] for neurology eval and EEG. Pt experiences possible seizure-like activity as per family. As per EMS, pt wasn't feeling well, went to sit on couch and family noted pt to have "body shaking", tongue biting and urinary incontinence. Pt endorses hx of childhood seizures. Denies CP, recent head injuries, SOB, NVD, HA, dizziness, changes in vision, numbness/tingling, weakness. PMH HTN, DM, HLD, CAD. Pt has no complaints at this time.

## 2022-06-27 NOTE — PHYSICAL THERAPY INITIAL EVALUATION ADULT - ADDITIONAL COMMENTS
Pt lives in a house with 2 steps to enter and flight of stairs with chair lift to bedroom. Patient is independent with managing chair lift. Patient was independent with all ADLs and IADLs prior to admission. Had one fall in April 2022. Uses standard cane for ambulation

## 2022-06-27 NOTE — CONSULT NOTE ADULT - SUBJECTIVE AND OBJECTIVE BOX
[facility] - Kasia Cook, Blas Arriaga, Mervin Villanueva Savella  Office Number: [phone]    Initial Consult Note    CHIEF COMPLAINT: Patient is a 88y old  Female who presents with a chief complaint of syncope/ ?  seizures (27 Jun 2022 08:09)      HPI:  88 year old RH female     with a past medical history of HTN, HLD, DM, CAD with stents     who is presenting as a transfer from [place] for evaluation of possible seizure episode.    At baseline, the patient is ambulatory with a cane when she goes outside, lives alone and performs her own ADLs. The patient had an episode of nosebleeding for which she first presented to Urgent Care and then went to [facility] ED where her bleeding spontaneously stopped and she was discharged home. She then went home and was sitting down and eating a pastry when she told her daughter who was present that the patient felt like she was going to faint. The patient stated she had symptoms of her vision darkening, a symptom of epigastric discomfort and nausea, sweating and then had lost consciousness. As per the daughter, the patient began to lean back, her eyes rolled back, and to avoid falling backwards and vomiting was pushed forward by the daughter. The episode lasted several seconds and then the patient regained consciousness. EMS was called.    The patient then seemed slightly disoriented  which lasted several minutes and had completely resolved by the time that EMS had arrived. It was noted that there was some liquid by the patient which was concerning for urinary incontinence however it unclear as the patient stated that her undergarments and dress were dry. The patient was then brought to the ED at [place] and was to undergo cardiac workup and was transferred to [facility] as there was no EEG monitoring. Currently, the patient states that she feels well and denied headaches, visual changes, auditory changes, numbness, tingling, weakness.  Patient notes that in the past she had a fall several months ago while she was ambulating and thinks that she may have lost consciousness during that time.  Patient stated that in the past when she was 10 years old, she was diagnosed with seizures. These episodes were marked by not being able to speak for a brief period of time and disorientation. Stated that she feels that she might remember these episodes but was unsure. She denied taking medications for these events and noted that when she underwent menarche, these episodes stopped and she has not had any since.  Is taking Wellbutrin and has been taking the same dose for 16 years as per daughter.     NIHSS: 0  MRS: 0     (27 Jun 2022 07:07)      PAST MEDICAL & SURGICAL HISTORY:  Diabetes      Hypertension, unspecified type      Stented coronary artery      Hyperlipidemia, unspecified hyperlipidemia type          SOCIAL HISTORY:  No tobacco, ethanol, or drug abuse.    FAMILY HISTORY:    No family history of acute MI or sudden cardiac death.    MEDICATIONS  (STANDING):  allopurinol 100 milliGRAM(s) Oral daily  aspirin enteric coated 81 milliGRAM(s) Oral daily  atorvastatin 40 milliGRAM(s) Oral at bedtime  buPROPion XL (24-Hour) . 150 milliGRAM(s) Oral daily  clopidogrel Tablet 75 milliGRAM(s) Oral daily  dextrose 5%. 1000 milliLiter(s) (100 mL/Hr) IV Continuous <Continuous>  dextrose 5%. 1000 milliLiter(s) (50 mL/Hr) IV Continuous <Continuous>  dextrose 50% Injectable 25 Gram(s) IV Push once  dextrose 50% Injectable 12.5 Gram(s) IV Push once  dextrose 50% Injectable 25 Gram(s) IV Push once  escitalopram 10 milliGRAM(s) Oral daily  glucagon  Injectable 1 milliGRAM(s) IntraMuscular once  heparin   Injectable 5000 Unit(s) SubCutaneous every 12 hours  insulin lispro (ADMELOG) corrective regimen sliding scale   SubCutaneous three times a day before meals  lisinopril 10 milliGRAM(s) Oral daily  metoprolol succinate ER 50 milliGRAM(s) Oral daily    MEDICATIONS  (PRN):  dextrose Oral Gel 15 Gram(s) Oral once PRN Blood Glucose LESS THAN 70 milliGRAM(s)/deciliter      Allergies    No Known Allergies    Intolerances        REVIEW OF SYSTEMS:    CONSTITUTIONAL: No weakness, fevers or chills  EYES/ENT: No visual changes;  No vertigo or throat pain   NECK: No pain or stiffness  RESPIRATORY: No cough, wheezing, hemoptysis; No shortness of breath  CARDIOVASCULAR: No chest pain or palpitations  GASTROINTESTINAL: No abdominal pain. No nausea, vomiting, or hematemesis; No diarrhea or constipation. No melena or hematochezia.  GENITOURINARY: No dysuria, frequency or hematuria  NEUROLOGICAL: No numbness or weakness  SKIN: No itching or rash  All other review of systems is negative unless indicated above    VITAL SIGNS:   Vital Signs Last 24 Hrs  T(C): 36.4 (27 Jun 2022 07:28), Max: 36.9 (26 Jun 2022 11:38)  T(F): 97.6 (27 Jun 2022 07:28), Max: 98.5 (26 Jun 2022 18:24)  HR: 75 (27 Jun 2022 07:28) (72 - 89)  BP: 148/64 (27 Jun 2022 07:28) (105/65 - 198/96)  BP(mean): --  RR: 15 (27 Jun 2022 07:28) (14 - 24)  SpO2: 98% (27 Jun 2022 07:28) (94% - 98%)    I&O's Summary      On Exam:    Constitutional: NAD, alert and oriented x 3  Lungs:  Non-labored, breath sounds are clear bilaterally, No wheezing, rales or rhonchi  Cardiovascular: RRR.  S1 and S2 positive.  No murmurs, rubs, gallops or clicks  Gastrointestinal: Bowel Sounds present, soft, nontender.   Lymph: No peripheral edema. No cervical lymphadenopathy.  Neurological: Alert, no focal deficits  Skin: No rashes or ulcers   Psych:  Mood & affect appropriate.    LABS: All Labs Reviewed:                        10.3   9.48  )-----------( 151      ( 27 Jun 2022 02:27 )             33.2                         11.5   9.01  )-----------( 169      ( 26 Jun 2022 14:30 )             37.3     27 Jun 2022 02:27    138    |  103    |  28     ----------------------------<  147    4.3     |  24     |  0.68   26 Jun 2022 14:30    138    |  104    |  29     ----------------------------<  180    4.5     |  30     |  0.91     Ca    8.8        27 Jun 2022 02:27  Ca    9.1        26 Jun 2022 14:30    TPro  6.6    /  Alb  3.5    /  TBili  0.3    /  DBili  x      /  AST  13     /  ALT  13     /  AlkPhos  57     27 Jun 2022 02:27  TPro  7.2    /  Alb  3.1    /  TBili  0.5    /  DBili  x      /  AST  12     /  ALT  17     /  AlkPhos  59     26 Jun 2022 14:30    PT/INR - ( 26 Jun 2022 14:30 )   PT: 12.5 sec;   INR: 1.07 ratio         PTT - ( 26 Jun 2022 14:30 )  PTT:23.7 sec  CARDIAC MARKERS ( 26 Jun 2022 14:30 )  x     / x     / 56 U/L / x     / 2.4 ng/mL      Blood Culture:         RADIOLOGY:    EKG: sr lbbb

## 2022-06-27 NOTE — H&P ADULT - NSHPLABSRESULTS_GEN_ALL_CORE
LABS:                        10.3   9.48  )-----------( 151      ( 2022 02:27 )             33.2     06-    138  |  103  |  28<H>  ----------------------------<  147<H>  4.3   |  24  |  0.68    Ca    8.8      2022 02:27    TPro  6.6  /  Alb  3.5  /  TBili  0.3  /  DBili  x   /  AST  13  /  ALT  13  /  AlkPhos  57      PT/INR - ( 2022 14:30 )   PT: 12.5 sec;   INR: 1.07 ratio         PTT - ( 2022 14:30 )  PTT:23.7 sec  CARDIAC MARKERS ( 2022 14:30 )  x     / x     / 56 U/L / x     / 2.4 ng/mL      Urinalysis Basic - ( 2022 20:13 )    Color: Yellow / Appearance: Clear / S.010 / pH: x  Gluc: x / Ketone: Negative  / Bili: Negative / Urobili: Negative   Blood: x / Protein: Negative / Nitrite: Negative   Leuk Esterase: Moderate / RBC: 3-5 /HPF / WBC 11-25   Sq Epi: x / Non Sq Epi: Moderate / Bacteria: Moderate

## 2022-06-27 NOTE — CONSULT NOTE ADULT - PROBLEM SELECTOR RECOMMENDATION 9
Will start Lantus 6u at bedtime and continue coverage. Will continue monitoring FS, log, and FU.  Patient counseled for compliance with consistent low carb diet.

## 2022-06-28 LAB
A1C WITH ESTIMATED AVERAGE GLUCOSE RESULT: 7.6 % — HIGH (ref 4–5.6)
ANION GAP SERPL CALC-SCNC: 11 MMOL/L — SIGNIFICANT CHANGE UP (ref 5–17)
APPEARANCE UR: CLEAR — SIGNIFICANT CHANGE UP
BACTERIA # UR AUTO: NEGATIVE — SIGNIFICANT CHANGE UP
BILIRUB UR-MCNC: NEGATIVE — SIGNIFICANT CHANGE UP
BUN SERPL-MCNC: 16 MG/DL — SIGNIFICANT CHANGE UP (ref 7–23)
CALCIUM SERPL-MCNC: 8.5 MG/DL — SIGNIFICANT CHANGE UP (ref 8.4–10.5)
CHLORIDE SERPL-SCNC: 104 MMOL/L — SIGNIFICANT CHANGE UP (ref 96–108)
CO2 SERPL-SCNC: 23 MMOL/L — SIGNIFICANT CHANGE UP (ref 22–31)
COLOR SPEC: SIGNIFICANT CHANGE UP
CREAT SERPL-MCNC: 0.68 MG/DL — SIGNIFICANT CHANGE UP (ref 0.5–1.3)
DIFF PNL FLD: NEGATIVE — SIGNIFICANT CHANGE UP
EGFR: 84 ML/MIN/1.73M2 — SIGNIFICANT CHANGE UP
EPI CELLS # UR: 1 /HPF — SIGNIFICANT CHANGE UP
ESTIMATED AVERAGE GLUCOSE: 171 MG/DL — HIGH (ref 68–114)
GLUCOSE BLDC GLUCOMTR-MCNC: 148 MG/DL — HIGH (ref 70–99)
GLUCOSE BLDC GLUCOMTR-MCNC: 187 MG/DL — HIGH (ref 70–99)
GLUCOSE BLDC GLUCOMTR-MCNC: 204 MG/DL — HIGH (ref 70–99)
GLUCOSE SERPL-MCNC: 156 MG/DL — HIGH (ref 70–99)
GLUCOSE UR QL: NEGATIVE — SIGNIFICANT CHANGE UP
HCT VFR BLD CALC: 31.6 % — LOW (ref 34.5–45)
HGB BLD-MCNC: 10.1 G/DL — LOW (ref 11.5–15.5)
HYALINE CASTS # UR AUTO: 0 /LPF — SIGNIFICANT CHANGE UP (ref 0–2)
KETONES UR-MCNC: NEGATIVE — SIGNIFICANT CHANGE UP
LEUKOCYTE ESTERASE UR-ACNC: ABNORMAL
MCHC RBC-ENTMCNC: 28.7 PG — SIGNIFICANT CHANGE UP (ref 27–34)
MCHC RBC-ENTMCNC: 32 GM/DL — SIGNIFICANT CHANGE UP (ref 32–36)
MCV RBC AUTO: 89.8 FL — SIGNIFICANT CHANGE UP (ref 80–100)
NITRITE UR-MCNC: NEGATIVE — SIGNIFICANT CHANGE UP
NRBC # BLD: 0 /100 WBCS — SIGNIFICANT CHANGE UP (ref 0–0)
OB PNL STL: POSITIVE
PH UR: 6.5 — SIGNIFICANT CHANGE UP (ref 5–8)
PLATELET # BLD AUTO: 137 K/UL — LOW (ref 150–400)
POTASSIUM SERPL-MCNC: 4.3 MMOL/L — SIGNIFICANT CHANGE UP (ref 3.5–5.3)
POTASSIUM SERPL-SCNC: 4.3 MMOL/L — SIGNIFICANT CHANGE UP (ref 3.5–5.3)
PROT UR-MCNC: NEGATIVE — SIGNIFICANT CHANGE UP
RBC # BLD: 3.52 M/UL — LOW (ref 3.8–5.2)
RBC # FLD: 13.1 % — SIGNIFICANT CHANGE UP (ref 10.3–14.5)
RBC CASTS # UR COMP ASSIST: 1 /HPF — SIGNIFICANT CHANGE UP (ref 0–4)
SODIUM SERPL-SCNC: 138 MMOL/L — SIGNIFICANT CHANGE UP (ref 135–145)
SP GR SPEC: 1.01 — SIGNIFICANT CHANGE UP (ref 1.01–1.02)
T4 FREE SERPL-MCNC: 1.3 NG/DL — SIGNIFICANT CHANGE UP (ref 0.9–1.8)
TSH SERPL-MCNC: 2.47 UIU/ML — SIGNIFICANT CHANGE UP (ref 0.27–4.2)
UROBILINOGEN FLD QL: NEGATIVE — SIGNIFICANT CHANGE UP
WBC # BLD: 6.43 K/UL — SIGNIFICANT CHANGE UP (ref 3.8–10.5)
WBC # FLD AUTO: 6.43 K/UL — SIGNIFICANT CHANGE UP (ref 3.8–10.5)
WBC UR QL: 97 /HPF — HIGH (ref 0–5)

## 2022-06-28 PROCEDURE — 93306 TTE W/DOPPLER COMPLETE: CPT | Mod: 26

## 2022-06-28 PROCEDURE — 99233 SBSQ HOSP IP/OBS HIGH 50: CPT

## 2022-06-28 RX ORDER — PANTOPRAZOLE SODIUM 20 MG/1
40 TABLET, DELAYED RELEASE ORAL ONCE
Refills: 0 | Status: COMPLETED | OUTPATIENT
Start: 2022-06-28 | End: 2022-06-28

## 2022-06-28 RX ORDER — INSULIN LISPRO 100/ML
VIAL (ML) SUBCUTANEOUS AT BEDTIME
Refills: 0 | Status: DISCONTINUED | OUTPATIENT
Start: 2022-06-28 | End: 2022-07-06

## 2022-06-28 RX ORDER — INSULIN LISPRO 100/ML
VIAL (ML) SUBCUTANEOUS
Refills: 0 | Status: DISCONTINUED | OUTPATIENT
Start: 2022-06-28 | End: 2022-07-06

## 2022-06-28 RX ORDER — SODIUM CHLORIDE 9 MG/ML
1000 INJECTION INTRAMUSCULAR; INTRAVENOUS; SUBCUTANEOUS
Refills: 0 | Status: DISCONTINUED | OUTPATIENT
Start: 2022-06-28 | End: 2022-06-29

## 2022-06-28 RX ORDER — INSULIN LISPRO 100/ML
4 VIAL (ML) SUBCUTANEOUS
Refills: 0 | Status: DISCONTINUED | OUTPATIENT
Start: 2022-06-28 | End: 2022-06-29

## 2022-06-28 RX ADMIN — Medication 4 UNIT(S): at 18:06

## 2022-06-28 RX ADMIN — SODIUM CHLORIDE 75 MILLILITER(S): 9 INJECTION INTRAMUSCULAR; INTRAVENOUS; SUBCUTANEOUS at 09:14

## 2022-06-28 RX ADMIN — HEPARIN SODIUM 5000 UNIT(S): 5000 INJECTION INTRAVENOUS; SUBCUTANEOUS at 05:28

## 2022-06-28 RX ADMIN — ESCITALOPRAM OXALATE 10 MILLIGRAM(S): 10 TABLET, FILM COATED ORAL at 09:13

## 2022-06-28 RX ADMIN — INSULIN GLARGINE 6 UNIT(S): 100 INJECTION, SOLUTION SUBCUTANEOUS at 21:33

## 2022-06-28 RX ADMIN — BUPROPION HYDROCHLORIDE 150 MILLIGRAM(S): 150 TABLET, EXTENDED RELEASE ORAL at 09:13

## 2022-06-28 RX ADMIN — Medication 81 MILLIGRAM(S): at 09:13

## 2022-06-28 RX ADMIN — ATORVASTATIN CALCIUM 40 MILLIGRAM(S): 80 TABLET, FILM COATED ORAL at 21:33

## 2022-06-28 RX ADMIN — Medication 2: at 09:13

## 2022-06-28 RX ADMIN — LISINOPRIL 10 MILLIGRAM(S): 2.5 TABLET ORAL at 05:26

## 2022-06-28 RX ADMIN — Medication 100 MILLIGRAM(S): at 09:13

## 2022-06-28 RX ADMIN — CLOPIDOGREL BISULFATE 75 MILLIGRAM(S): 75 TABLET, FILM COATED ORAL at 09:13

## 2022-06-28 RX ADMIN — HEPARIN SODIUM 5000 UNIT(S): 5000 INJECTION INTRAVENOUS; SUBCUTANEOUS at 18:05

## 2022-06-28 RX ADMIN — Medication 50 MILLIGRAM(S): at 05:26

## 2022-06-28 NOTE — PROGRESS NOTE ADULT - SUBJECTIVE AND OBJECTIVE BOX
afberile  REVIEW OF SYSTEMS:  GEN: no fever,    no chills  RESP: no SOB,   no cough  CVS: no chest pain,   no palpitations  GI: no abdominal pain,   no nausea,   no vomiting,   no constipation,   no diarrhea  : no dysuria,   no frequency  NEURO: no headache,   no dizziness  PSYCH: no depression,   not anxious  Derm : no rash    MEDICATIONS  (STANDING):  allopurinol 100 milliGRAM(s) Oral daily  aspirin enteric coated 81 milliGRAM(s) Oral daily  atorvastatin 40 milliGRAM(s) Oral at bedtime  buPROPion XL (24-Hour) . 150 milliGRAM(s) Oral daily  clopidogrel Tablet 75 milliGRAM(s) Oral daily  dextrose 5%. 1000 milliLiter(s) (50 mL/Hr) IV Continuous <Continuous>  dextrose 5%. 1000 milliLiter(s) (100 mL/Hr) IV Continuous <Continuous>  dextrose 50% Injectable 25 Gram(s) IV Push once  dextrose 50% Injectable 12.5 Gram(s) IV Push once  dextrose 50% Injectable 25 Gram(s) IV Push once  escitalopram 10 milliGRAM(s) Oral daily  glucagon  Injectable 1 milliGRAM(s) IntraMuscular once  heparin   Injectable 5000 Unit(s) SubCutaneous every 12 hours  insulin glargine Injectable (LANTUS) 6 Unit(s) SubCutaneous at bedtime  insulin lispro (ADMELOG) corrective regimen sliding scale   SubCutaneous at bedtime  insulin lispro (ADMELOG) corrective regimen sliding scale   SubCutaneous three times a day before meals  lisinopril 10 milliGRAM(s) Oral daily  metoprolol succinate ER 50 milliGRAM(s) Oral daily    MEDICATIONS  (PRN):  dextrose Oral Gel 15 Gram(s) Oral once PRN Blood Glucose LESS THAN 70 milliGRAM(s)/deciliter      Vital Signs Last 24 Hrs  T(C): 36.3 (2022 05:07), Max: 37.2 (2022 19:20)  T(F): 97.4 (2022 05:07), Max: 99 (2022 19:20)  HR: 65 (2022 05:07) (65 - 89)  BP: 176/83 (2022 06:41) (146/79 - 181/69)  BP(mean): --  RR: 18 (2022 05:07) (16 - 20)  SpO2: 95% (2022 05:07) (92% - 97%)  CAPILLARY BLOOD GLUCOSE      POCT Blood Glucose.: 173 mg/dL (2022 22:57)  POCT Blood Glucose.: 249 mg/dL (2022 21:34)  POCT Blood Glucose.: 109 mg/dL (2022 17:50)  POCT Blood Glucose.: 358 mg/dL (2022 11:28)  POCT Blood Glucose.: 171 mg/dL (2022 09:00)    I&O's Summary    2022 07:01  -  2022 07:00  --------------------------------------------------------  IN: 320 mL / OUT: 800 mL / NET: -480 mL        PHYSICAL EXAM:  HEAD:  Atraumatic, Normocephalic  NECK: Supple, No   JVD  CHEST/LUNG:   no     rales,     no,    rhonchi  HEART: Regular rate and rhythm;         murmur  ABDOMEN: Soft, Nontender, ;   EXTREMITIES:    no    edema  NEUROLOGY:  alert    LABS:                        10.1   6.43  )-----------( 137      ( 2022 07:17 )             31.6     06-28    138  |  104  |  16  ----------------------------<  156<H>  4.3   |  23  |  0.68    Ca    8.5      2022 07:18    TPro  6.6  /  Alb  3.5  /  TBili  0.3  /  DBili  x   /  AST  13  /  ALT  13  /  AlkPhos  57  06-27    PT/INR - ( 2022 14:30 )   PT: 12.5 sec;   INR: 1.07 ratio         PTT - ( 2022 14:30 )  PTT:23.7 sec  CARDIAC MARKERS ( 2022 14:30 )  x     / x     / 56 U/L / x     / 2.4 ng/mL      Urinalysis Basic - ( 2022 03:08 )    Color: Light Yellow / Appearance: Clear / S.010 / pH: x  Gluc: x / Ketone: Negative  / Bili: Negative / Urobili: Negative   Blood: x / Protein: Negative / Nitrite: Negative   Leuk Esterase: Large / RBC: 1 /hpf / WBC 97 /HPF   Sq Epi: x / Non Sq Epi: 1 /hpf / Bacteria: Negative                      Consultant(s) Notes Reviewed:      Care Discussed with Consultants/Other Providers:

## 2022-06-28 NOTE — PROGRESS NOTE ADULT - SUBJECTIVE AND OBJECTIVE BOX
[facility]    Kasia Cook, Bart, Blas, Kay, Mervin, Juvenal      [phone]    CHIEF COMPLAINT: Patient is a 88y old  Female who presents with a chief complaint of syncope/ ?  seizures (28 Jun 2022 07:05)      Follow Up: syncope, lbbb    Interim history: The patient reports no new symptoms.  Denies chest discomfort and shortness of breath.  No abdominal pain.  No new neurologic symptoms.      MEDICATIONS  (STANDING):  allopurinol 100 milliGRAM(s) Oral daily  aspirin enteric coated 81 milliGRAM(s) Oral daily  atorvastatin 40 milliGRAM(s) Oral at bedtime  buPROPion XL (24-Hour) . 150 milliGRAM(s) Oral daily  clopidogrel Tablet 75 milliGRAM(s) Oral daily  dextrose 5%. 1000 milliLiter(s) (50 mL/Hr) IV Continuous <Continuous>  dextrose 5%. 1000 milliLiter(s) (100 mL/Hr) IV Continuous <Continuous>  dextrose 50% Injectable 25 Gram(s) IV Push once  dextrose 50% Injectable 12.5 Gram(s) IV Push once  dextrose 50% Injectable 25 Gram(s) IV Push once  escitalopram 10 milliGRAM(s) Oral daily  glucagon  Injectable 1 milliGRAM(s) IntraMuscular once  heparin   Injectable 5000 Unit(s) SubCutaneous every 12 hours  insulin glargine Injectable (LANTUS) 6 Unit(s) SubCutaneous at bedtime  insulin lispro (ADMELOG) corrective regimen sliding scale   SubCutaneous at bedtime  insulin lispro (ADMELOG) corrective regimen sliding scale   SubCutaneous three times a day before meals  lisinopril 10 milliGRAM(s) Oral daily  metoprolol succinate ER 50 milliGRAM(s) Oral daily    MEDICATIONS  (PRN):  dextrose Oral Gel 15 Gram(s) Oral once PRN Blood Glucose LESS THAN 70 milliGRAM(s)/deciliter      REVIEW OF SYSTEMS:  eye, ent, GI, , allergic, dermatologic, musculoskeletal and neurologic are negative except as described above    Vital Signs Last 24 Hrs  T(C): 36.3 (28 Jun 2022 05:07), Max: 37.2 (27 Jun 2022 19:20)  T(F): 97.4 (28 Jun 2022 05:07), Max: 99 (27 Jun 2022 19:20)  HR: 65 (28 Jun 2022 05:07) (65 - 89)  BP: 176/83 (28 Jun 2022 06:41) (146/79 - 181/69)  BP(mean): --  RR: 18 (28 Jun 2022 05:07) (16 - 20)  SpO2: 95% (28 Jun 2022 05:07) (92% - 97%)    I&O's Summary    27 Jun 2022 07:01  -  28 Jun 2022 07:00  --------------------------------------------------------  IN: 320 mL / OUT: 800 mL / NET: -480 mL        Telemetry past 24h: sr    PHYSICAL EXAM:    Constitutional: well-nourished, well-developed, NAD   HEENT:  MMM, sclerae anicteric, conjunctivae clear, no oral cyanosis.  Pulmonary: Non-labored, breath sounds are clear bilaterally, No wheezing, rales or rhonchi  Cardiovascular: Regular, S1 and S2.  No murmur.  No rubs, gallops or clicks  Gastrointestinal: Bowel Sounds present, soft, nontender.   Lymph: No peripheral edema.   Neurological: Alert, no focal deficits  Skin: No rashes.  Psych:  Mood & affect appropriate    LABS: All Labs Reviewed:                        10.3   9.48  )-----------( 151      ( 27 Jun 2022 02:27 )             33.2                         11.5   9.01  )-----------( 169      ( 26 Jun 2022 14:30 )             37.3     27 Jun 2022 02:27    138    |  103    |  28     ----------------------------<  147    4.3     |  24     |  0.68   26 Jun 2022 14:30    138    |  104    |  29     ----------------------------<  180    4.5     |  30     |  0.91     Ca    8.8        27 Jun 2022 02:27  Ca    9.1        26 Jun 2022 14:30    TPro  6.6    /  Alb  3.5    /  TBili  0.3    /  DBili  x      /  AST  13     /  ALT  13     /  AlkPhos  57     27 Jun 2022 02:27  TPro  7.2    /  Alb  3.1    /  TBili  0.5    /  DBili  x      /  AST  12     /  ALT  17     /  AlkPhos  59     26 Jun 2022 14:30    PT/INR - ( 26 Jun 2022 14:30 )   PT: 12.5 sec;   INR: 1.07 ratio         PTT - ( 26 Jun 2022 14:30 )  PTT:23.7 sec  CARDIAC MARKERS ( 26 Jun 2022 14:30 )  x     / x     / 56 U/L / x     / 2.4 ng/mL      Blood Culture:         RADIOLOGY:    EKG:    Echo:

## 2022-06-28 NOTE — PROGRESS NOTE ADULT - SUBJECTIVE AND OBJECTIVE BOX
Chief complaint  Patient is a 88y old  Female who presents with a chief complaint of syncope/ ?  seizures (28 Jun 2022 08:31)   Review of systems  Patient in bed, looks comfortable, no hypoglycemic episodes.    Labs and Fingersticks  CAPILLARY BLOOD GLUCOSE      POCT Blood Glucose.: 204 mg/dL (28 Jun 2022 08:58)  POCT Blood Glucose.: 173 mg/dL (27 Jun 2022 22:57)  POCT Blood Glucose.: 249 mg/dL (27 Jun 2022 21:34)  POCT Blood Glucose.: 109 mg/dL (27 Jun 2022 17:50)      Anion Gap, Serum: 11 (06-28 @ 07:18)  Anion Gap, Serum: 11 (06-27 @ 02:27)  Anion Gap, Serum: 4 *L* (06-26 @ 14:30)      Calcium, Total Serum: 8.5 (06-28 @ 07:18)  Calcium, Total Serum: 8.8 (06-27 @ 02:27)  Calcium, Total Serum: 9.1 (06-26 @ 14:30)  Albumin, Serum: 3.5 (06-27 @ 02:27)  Albumin, Serum: 3.1 *L* (06-26 @ 14:30)    Alanine Aminotransferase (ALT/SGPT): 13 (06-27 @ 02:27)  Alanine Aminotransferase (ALT/SGPT): 17 (06-26 @ 14:30)  Alkaline Phosphatase, Serum: 57 (06-27 @ 02:27)  Alkaline Phosphatase, Serum: 59 (06-26 @ 14:30)  Aspartate Aminotransferase (AST/SGOT): 13 (06-27 @ 02:27)  Aspartate Aminotransferase (AST/SGOT): 12 *L* (06-26 @ 14:30)        06-28    138  |  104  |  16  ----------------------------<  156<H>  4.3   |  23  |  0.68    Ca    8.5      28 Jun 2022 07:18    TPro  6.6  /  Alb  3.5  /  TBili  0.3  /  DBili  x   /  AST  13  /  ALT  13  /  AlkPhos  57  06-27                        10.1   6.43  )-----------( 137      ( 28 Jun 2022 07:17 )             31.6     Medications  MEDICATIONS  (STANDING):  allopurinol 100 milliGRAM(s) Oral daily  aspirin enteric coated 81 milliGRAM(s) Oral daily  atorvastatin 40 milliGRAM(s) Oral at bedtime  buPROPion XL (24-Hour) . 150 milliGRAM(s) Oral daily  clopidogrel Tablet 75 milliGRAM(s) Oral daily  dextrose 5%. 1000 milliLiter(s) (50 mL/Hr) IV Continuous <Continuous>  dextrose 5%. 1000 milliLiter(s) (100 mL/Hr) IV Continuous <Continuous>  dextrose 50% Injectable 25 Gram(s) IV Push once  dextrose 50% Injectable 12.5 Gram(s) IV Push once  dextrose 50% Injectable 25 Gram(s) IV Push once  escitalopram 10 milliGRAM(s) Oral daily  glucagon  Injectable 1 milliGRAM(s) IntraMuscular once  heparin   Injectable 5000 Unit(s) SubCutaneous every 12 hours  insulin glargine Injectable (LANTUS) 6 Unit(s) SubCutaneous at bedtime  insulin lispro (ADMELOG) corrective regimen sliding scale   SubCutaneous three times a day before meals  insulin lispro (ADMELOG) corrective regimen sliding scale   SubCutaneous at bedtime  insulin lispro Injectable (ADMELOG) 4 Unit(s) SubCutaneous three times a day before meals  lisinopril 10 milliGRAM(s) Oral daily  metoprolol succinate ER 50 milliGRAM(s) Oral daily  sodium chloride 0.9%. 1000 milliLiter(s) (75 mL/Hr) IV Continuous <Continuous>      Physical Exam  General: Patient comfortable in bed  Vital Signs Last 12 Hrs  T(F): 97.4 (06-28-22 @ 05:07), Max: 97.4 (06-28-22 @ 05:07)  HR: 65 (06-28-22 @ 05:07) (65 - 65)  BP: 176/83 (06-28-22 @ 06:41) (176/83 - 181/69)  BP(mean): --  RR: 18 (06-28-22 @ 05:07) (18 - 18)  SpO2: 95% (06-28-22 @ 05:07) (95% - 95%)  Neck: No palpable thyroid nodules.  CVS: S1S2, No murmurs  Respiratory: No wheezing, no crepitations  GI: Abdomen soft, bowel sounds positive  Musculoskeletal:  edema lower extremities.   Skin: No skin rashes, no ecchymosis    Diagnostics    A1C with Estimated Average Glucose: Routine (06-28 @ 13:18)  Free Thyroxine, Serum: AM Sched. Collection: 28-Jun-2022 06:00 (06-27 @ 14:03)  Thyroid Stimulating Hormone, Serum: AM Sched. Collection: 28-Jun-2022 06:00 (06-27 @ 14:03)  A1C with Estimated Average Glucose: Routine (06-27 @ 14:03)           Chief complaint  Patient is a 88y old  Female who presents with a chief complaint of syncope/ ?  seizures (28 Jun 2022 08:31)   Review of systems  Patient in bed, looks comfortable, no hypoglycemic episodes.    Labs and Fingersticks  CAPILLARY BLOOD GLUCOSE      POCT Blood Glucose.: 204 mg/dL (28 Jun 2022 08:58)  POCT Blood Glucose.: 173 mg/dL (27 Jun 2022 22:57)  POCT Blood Glucose.: 249 mg/dL (27 Jun 2022 21:34)  POCT Blood Glucose.: 109 mg/dL (27 Jun 2022 17:50)      Anion Gap, Serum: 11 (06-28 @ 07:18)  Anion Gap, Serum: 11 (06-27 @ 02:27)  Anion Gap, Serum: 4 *L* (06-26 @ 14:30)      Calcium, Total Serum: 8.5 (06-28 @ 07:18)  Calcium, Total Serum: 8.8 (06-27 @ 02:27)  Calcium, Total Serum: 9.1 (06-26 @ 14:30)  Albumin, Serum: 3.5 (06-27 @ 02:27)  Albumin, Serum: 3.1 *L* (06-26 @ 14:30)    Alanine Aminotransferase (ALT/SGPT): 13 (06-27 @ 02:27)  Alanine Aminotransferase (ALT/SGPT): 17 (06-26 @ 14:30)  Alkaline Phosphatase, Serum: 57 (06-27 @ 02:27)  Alkaline Phosphatase, Serum: 59 (06-26 @ 14:30)  Aspartate Aminotransferase (AST/SGOT): 13 (06-27 @ 02:27)  Aspartate Aminotransferase (AST/SGOT): 12 *L* (06-26 @ 14:30)        06-28    138  |  104  |  16  ----------------------------<  156<H>  4.3   |  23  |  0.68    Ca    8.5      28 Jun 2022 07:18    TPro  6.6  /  Alb  3.5  /  TBili  0.3  /  DBili  x   /  AST  13  /  ALT  13  /  AlkPhos  57  06-27                        10.1   6.43  )-----------( 137      ( 28 Jun 2022 07:17 )             31.6     Medications  MEDICATIONS  (STANDING):  allopurinol 100 milliGRAM(s) Oral daily  aspirin enteric coated 81 milliGRAM(s) Oral daily  atorvastatin 40 milliGRAM(s) Oral at bedtime  buPROPion XL (24-Hour) . 150 milliGRAM(s) Oral daily  clopidogrel Tablet 75 milliGRAM(s) Oral daily  dextrose 5%. 1000 milliLiter(s) (50 mL/Hr) IV Continuous <Continuous>  dextrose 5%. 1000 milliLiter(s) (100 mL/Hr) IV Continuous <Continuous>  dextrose 50% Injectable 25 Gram(s) IV Push once  dextrose 50% Injectable 12.5 Gram(s) IV Push once  dextrose 50% Injectable 25 Gram(s) IV Push once  escitalopram 10 milliGRAM(s) Oral daily  glucagon  Injectable 1 milliGRAM(s) IntraMuscular once  heparin   Injectable 5000 Unit(s) SubCutaneous every 12 hours  insulin glargine Injectable (LANTUS) 6 Unit(s) SubCutaneous at bedtime  insulin lispro (ADMELOG) corrective regimen sliding scale   SubCutaneous three times a day before meals  insulin lispro (ADMELOG) corrective regimen sliding scale   SubCutaneous at bedtime  insulin lispro Injectable (ADMELOG) 4 Unit(s) SubCutaneous three times a day before meals  lisinopril 10 milliGRAM(s) Oral daily  metoprolol succinate ER 50 milliGRAM(s) Oral daily  sodium chloride 0.9%. 1000 milliLiter(s) (75 mL/Hr) IV Continuous <Continuous>      Physical Exam  General: Patient comfortable in bed  Vital Signs Last 12 Hrs  T(F): 97.4 (06-28-22 @ 05:07), Max: 97.4 (06-28-22 @ 05:07)  HR: 65 (06-28-22 @ 05:07) (65 - 65)  BP: 176/83 (06-28-22 @ 06:41) (176/83 - 181/69)  BP(mean): --  RR: 18 (06-28-22 @ 05:07) (18 - 18)  SpO2: 95% (06-28-22 @ 05:07) (95% - 95%)  Neck: No palpable thyroid nodules.  CVS: S1S2, No murmurs  Respiratory: No wheezing, no crepitations  GI: Abdomen soft, bowel sounds positive  Musculoskeletal:  edema lower extremities.   Skin: No skin rashes, no ecchymosis    Diagnostics    A1C with Estimated Average Glucose: Routine (06-28 @ 13:18)  Free Thyroxine, Serum: AM Sched. Collection: 28-Jun-2022 06:00 (06-27 @ 14:03)  Thyroid Stimulating Hormone, Serum: AM Sched. Collection: 28-Jun-2022 06:00 (06-27 @ 14:03)  A1C with Estimated Average Glucose: Routine (06-27 @ 14:03)

## 2022-06-28 NOTE — PROGRESS NOTE ADULT - SUBJECTIVE AND OBJECTIVE BOX
EP    PROGRESS  NOTE   ________________________________________________    CHIEF COMPLAINT:Patient is a 88y old  Female who presents with a chief complaint of syncope/ ?  seizures (27 Jun 2022 16:36)  no complain.  	  REVIEW OF SYSTEMS:  CONSTITUTIONAL: No fever, weight loss, or fatigue  EYES: No eye pain, visual disturbances, or discharge  ENT:  No difficulty hearing, tinnitus, vertigo; No sinus or throat pain  NECK: No pain or stiffness  RESPIRATORY: No cough, wheezing, chills or hemoptysis; No Shortness of Breath  CARDIOVASCULAR: No chest pain, palpitations, passing out, dizziness, or leg swelling  GASTROINTESTINAL: No abdominal or epigastric pain. No nausea, vomiting, or hematemesis; No diarrhea or constipation. No melena or hematochezia.  GENITOURINARY: No dysuria, frequency, hematuria, or incontinence  NEUROLOGICAL: No headaches, memory loss, loss of strength, numbness, or tremors  SKIN: No itching, burning, rashes, or lesions   LYMPH Nodes: No enlarged glands  ENDOCRINE: No heat or cold intolerance; No hair loss  MUSCULOSKELETAL: No joint pain or swelling; No muscle, back, or extremity pain  PSYCHIATRIC: No depression, anxiety, mood swings, or difficulty sleeping  HEME/LYMPH: No easy bruising, or bleeding gums  ALLERGY AND IMMUNOLOGIC: No hives or eczema	    [ ] All others negative	  [ ] Unable to obtain    PHYSICAL EXAM:  T(C): 36.3 (06-28-22 @ 05:07), Max: 37.2 (06-27-22 @ 19:20)  HR: 65 (06-28-22 @ 05:07) (65 - 89)  BP: 176/83 (06-28-22 @ 06:41) (146/79 - 181/69)  RR: 18 (06-28-22 @ 05:07) (15 - 20)  SpO2: 95% (06-28-22 @ 05:07) (92% - 98%)  Wt(kg): --  I&O's Summary    27 Jun 2022 07:01  -  28 Jun 2022 07:00  --------------------------------------------------------  IN: 320 mL / OUT: 800 mL / NET: -480 mL        Appearance: Normal	  HEENT:   Normal oral mucosa, PERRL, EOMI	  Lymphatic: No lymphadenopathy  Cardiovascular: Normal S1 S2, No JVD, + murmurs, No edema  Respiratory:rhonchi  Psychiatry: A & O x 3, Mood & affect appropriate  Gastrointestinal:  Soft, Non-tender, + BS	  Skin: No rashes, No ecchymoses, No cyanosis	  Neurologic: Non-focal  Extremities: Normal range of motion, No clubbing, cyanosis or edema  Vascular: Peripheral pulses palpable 2+ bilaterally    MEDICATIONS  (STANDING):  allopurinol 100 milliGRAM(s) Oral daily  aspirin enteric coated 81 milliGRAM(s) Oral daily  atorvastatin 40 milliGRAM(s) Oral at bedtime  buPROPion XL (24-Hour) . 150 milliGRAM(s) Oral daily  clopidogrel Tablet 75 milliGRAM(s) Oral daily  dextrose 5%. 1000 milliLiter(s) (50 mL/Hr) IV Continuous <Continuous>  dextrose 5%. 1000 milliLiter(s) (100 mL/Hr) IV Continuous <Continuous>  dextrose 50% Injectable 25 Gram(s) IV Push once  dextrose 50% Injectable 12.5 Gram(s) IV Push once  dextrose 50% Injectable 25 Gram(s) IV Push once  escitalopram 10 milliGRAM(s) Oral daily  glucagon  Injectable 1 milliGRAM(s) IntraMuscular once  heparin   Injectable 5000 Unit(s) SubCutaneous every 12 hours  insulin glargine Injectable (LANTUS) 6 Unit(s) SubCutaneous at bedtime  insulin lispro (ADMELOG) corrective regimen sliding scale   SubCutaneous at bedtime  insulin lispro (ADMELOG) corrective regimen sliding scale   SubCutaneous three times a day before meals  lisinopril 10 milliGRAM(s) Oral daily  metoprolol succinate ER 50 milliGRAM(s) Oral daily      TELEMETRY: 	    ECG:  	  RADIOLOGY:  OTHER: 	  	  LABS:	 	    CARDIAC MARKERS:  CARDIAC MARKERS ( 26 Jun 2022 14:30 )  x     / x     / 56 U/L / x     / 2.4 ng/mL                                10.3   9.48  )-----------( 151      ( 27 Jun 2022 02:27 )             33.2     06-27    138  |  103  |  28<H>  ----------------------------<  147<H>  4.3   |  24  |  0.68    Ca    8.8      27 Jun 2022 02:27    TPro  6.6  /  Alb  3.5  /  TBili  0.3  /  DBili  x   /  AST  13  /  ALT  13  /  AlkPhos  57  06-27    proBNP:   Lipid Profile:   HgA1c:   TSH:   PT/INR - ( 26 Jun 2022 14:30 )   PT: 12.5 sec;   INR: 1.07 ratio         PTT - ( 26 Jun 2022 14:30 )  PTT:23.7 sec      Assessment and plan  ---------------------------  88 year old RH female with a past medical history of HTN, HLD, DM, CAD with stents who is presenting as a transfer from [place] for evaluation of possible seizure episode.    At baseline, the patient is ambulatory with a cane when she goes outside, lives alone and performs her own ADLs. The patient had an episode of nose bleeding for which she first presented to Urgent Care and then went to [facility] ED where her bleeding spontaneously stopped and she was discharged home. She then went home and was sitting down and eating a pastry when she told her daughter who was present that the patient felt like she was going to faint. The patient stated she had symptoms of her vision darkening, a symptom of epigastric discomfort and nausea, sweating and then had lost consciousness. As per the daughter, the patient began to lean back, her eyes rolled back, and to avoid falling backwards and vomiting was pushed forward by the daughter. The episode lasted several seconds and then the patient regained consciousness. EMS was called.    The patient then seemed slightly disoriented  which lasted several minutes and had completely resolved by the time that EMS had arrived. It was noted that there was some liquid by the patient which was concerning for urinary incontinence however it unclear as the patient stated that her undergarments and dress were dry. The patient was then brought to the ED at [place] and was to undergo cardiac workup and was transferred to [facility] as there was no EEG monitoring. Currently, the patient states that she feels well and denied headaches, visual changes, auditory changes, numbness, tingling, weakness.  Patient notes that in the past she had a fall several months ago while she was ambulating and thinks that she may have lost consciousness during that time.  Patient stated that in the past when she was 10 years old, she was diagnosed with seizures. These episodes were marked by not being able to speak for a brief period of time and disorientation. Stated that she feels that she might remember these episodes but was unsure. She denied taking medications for these events and noted that when she underwent menarche, these episodes stopped and she has not had any since.  Is taking Wellbutrin and has been taking the same dose for 16 years as per daughter.   pt with sig cardiac hx s/p multiple stents ?new LBBB  tele nsr  echo  may need ischemia goldstein if not done recently  will adjust cardiac meds  severe orthostatic hypotension, keep sbp >140 at rest sitting  clive stocking  am cortisol lervel

## 2022-06-28 NOTE — PROGRESS NOTE ADULT - ASSESSMENT
89 yo female with PMHX DM, HTN, HLD, CAD present to ED s/p syncopal episode     - episode was felt to be most likely vagally mediated, with epistaxis earlier in the day  - that being said, patient with LBBB (of unclear chronicity) and reported CAD  -is also severely orthostatic, with drop in sbp of ~50mmHg, though her sbp when measured was 120 upright  - would continue to watch on telemetry  -await  echocardiogram  -noting orthostatic bps would encourage adequate hydration, compression stockings. would not add midodrine or florinef noting generally adequate upright bp documented so far, and baseline hypertension to 170s  - need to obtain prior cardiac history, and may end up needing a pharm stress during the admission, though thus far no immediate need for ischemic eval noted. would wait for echo  - ep evaluation noted   - cont asa, plavix (remains on dapt?) and statin  - cont metoprolol and lisinopril  - trend creatinine and electrolytes. Keep K>4, mg>2  - will follow with you

## 2022-06-28 NOTE — PROGRESS NOTE ADULT - ASSESSMENT
Assessment  DMT2: 88y Female with DM T2 with hyperglycemia, A1C pending, was on oral meds/Janumet at home, now on low-dose basal insulin and coverage, blood sugars fluctuating/slightly elevated, no hypoglycemic episodes, eating, no acute events.  Syncope: workup in progress, monitored.  HTN: Controlled,  on antihypertensive medications.  HLD: On statin.      Jaspal Augustine MD  Cell: [phone]  Office: [phone]       Assessment  DMT2: 88y Female with DM T2 with hyperglycemia, A1C pending, was on oral meds/Janumet at home, now on low-dose basal insulin and coverage, blood sugars fluctuating/slightly elevated, no hypoglycemic  episodes, eating, no acute events.  Syncope: workup in progress, monitored.  HTN: Controlled,  on antihypertensive medications.  HLD: On statin.      Jaspal Augustine MD  Cell: [phone]  Office: [phone]

## 2022-06-28 NOTE — CHART NOTE - NSCHARTNOTEFT_GEN_A_CORE
MEDICINE PA Note    ANIKA CHISHOLM    Notified by RN, patient had one dark stool. FOBT +. Pt is hemodynamically stable at this time.     ICU Vital Signs Last 24 Hrs  T(C): 36.6 (28 Jun 2022 20:51), Max: 36.6 (28 Jun 2022 20:51)  T(F): 97.8 (28 Jun 2022 20:51), Max: 97.8 (28 Jun 2022 20:51)  HR: 73 (28 Jun 2022 20:51) (65 - 73)  BP: 130/71 (28 Jun 2022 20:51) (130/71 - 181/69)  BP(mean): --  ABP: --  ABP(mean): --  RR: 18 (28 Jun 2022 20:51) (18 - 18)  SpO2: 97% (28 Jun 2022 20:51) (95% - 97%)    Occult Blood, Feces (06.28.22 @ 22:42)    Occult Blood, Feces: Positive                    10.1   6.43  )-----------( 137      ( 28 Jun 2022 07:17 )             31.6     PLAN:  - CBC STAT, will transfuse if necessary   - Protonix IVP 40mg x1 dose  - Will hold morning Heparin subQ  - Will continue to monitor patients vital signs  - Will endorse to AM team     Ruby Babcock PA-C  Department of Medicine  Spectra #[phone]

## 2022-06-28 NOTE — PROGRESS NOTE ADULT - ASSESSMENT
88 year old RH female      h/o HTN, HLD, DM, CAD with stents     who is presenting as a transfer from [place] for evaluation of possible seizure episode./  and  s/p  syncope   LOC episode with prodrome of darkening of vision, sweating, nausea with history of  cad, need  to  r/o  cardiac  causes   per  neuro,  no  seizures meds    tele.  r/o  arrythmia   echo/  card d r golyan   HYN/ HLD  on  asa.  lisinoril, norvasc   CAD/  stents, on asa./ plavix/  lipitor   DM,  follow  fson  janumet   depression, on bupropion,  lexapro  on  dvt  ppx   +  orthostatic,  on iv fluids

## 2022-06-29 LAB
CORTIS AM PEAK SERPL-MCNC: 6.7 UG/DL — SIGNIFICANT CHANGE UP (ref 6–18.4)
CULTURE RESULTS: SIGNIFICANT CHANGE UP
GLUCOSE BLDC GLUCOMTR-MCNC: 138 MG/DL — HIGH (ref 70–99)
GLUCOSE BLDC GLUCOMTR-MCNC: 152 MG/DL — HIGH (ref 70–99)
GLUCOSE BLDC GLUCOMTR-MCNC: 168 MG/DL — HIGH (ref 70–99)
GLUCOSE BLDC GLUCOMTR-MCNC: 200 MG/DL — HIGH (ref 70–99)
HCT VFR BLD CALC: 30 % — LOW (ref 34.5–45)
HGB BLD-MCNC: 9.5 G/DL — LOW (ref 11.5–15.5)
MCHC RBC-ENTMCNC: 28.4 PG — SIGNIFICANT CHANGE UP (ref 27–34)
MCHC RBC-ENTMCNC: 31.7 GM/DL — LOW (ref 32–36)
MCV RBC AUTO: 89.6 FL — SIGNIFICANT CHANGE UP (ref 80–100)
NRBC # BLD: 0 /100 WBCS — SIGNIFICANT CHANGE UP (ref 0–0)
PLATELET # BLD AUTO: 146 K/UL — LOW (ref 150–400)
RBC # BLD: 3.35 M/UL — LOW (ref 3.8–5.2)
RBC # FLD: 12.9 % — SIGNIFICANT CHANGE UP (ref 10.3–14.5)
SPECIMEN SOURCE: SIGNIFICANT CHANGE UP
WBC # BLD: 6.98 K/UL — SIGNIFICANT CHANGE UP (ref 3.8–10.5)
WBC # FLD AUTO: 6.98 K/UL — SIGNIFICANT CHANGE UP (ref 3.8–10.5)

## 2022-06-29 PROCEDURE — 99232 SBSQ HOSP IP/OBS MODERATE 35: CPT

## 2022-06-29 PROCEDURE — 99223 1ST HOSP IP/OBS HIGH 75: CPT | Mod: FS

## 2022-06-29 PROCEDURE — 95720 EEG PHY/QHP EA INCR W/VEEG: CPT

## 2022-06-29 RX ORDER — INSULIN LISPRO 100/ML
6 VIAL (ML) SUBCUTANEOUS
Refills: 0 | Status: DISCONTINUED | OUTPATIENT
Start: 2022-06-29 | End: 2022-06-30

## 2022-06-29 RX ORDER — LISINOPRIL 2.5 MG/1
5 TABLET ORAL DAILY
Refills: 0 | Status: DISCONTINUED | OUTPATIENT
Start: 2022-06-29 | End: 2022-06-29

## 2022-06-29 RX ORDER — LISINOPRIL 2.5 MG/1
15 TABLET ORAL DAILY
Refills: 0 | Status: DISCONTINUED | OUTPATIENT
Start: 2022-06-29 | End: 2022-06-29

## 2022-06-29 RX ORDER — LISINOPRIL 2.5 MG/1
5 TABLET ORAL ONCE
Refills: 0 | Status: COMPLETED | OUTPATIENT
Start: 2022-06-29 | End: 2022-06-29

## 2022-06-29 RX ORDER — LISINOPRIL 2.5 MG/1
15 TABLET ORAL DAILY
Refills: 0 | Status: DISCONTINUED | OUTPATIENT
Start: 2022-06-30 | End: 2022-06-30

## 2022-06-29 RX ORDER — PANTOPRAZOLE SODIUM 20 MG/1
40 TABLET, DELAYED RELEASE ORAL
Refills: 0 | Status: DISCONTINUED | OUTPATIENT
Start: 2022-06-29 | End: 2022-07-06

## 2022-06-29 RX ADMIN — INSULIN GLARGINE 6 UNIT(S): 100 INJECTION, SOLUTION SUBCUTANEOUS at 21:41

## 2022-06-29 RX ADMIN — ESCITALOPRAM OXALATE 10 MILLIGRAM(S): 10 TABLET, FILM COATED ORAL at 11:22

## 2022-06-29 RX ADMIN — Medication 4 UNIT(S): at 09:30

## 2022-06-29 RX ADMIN — Medication 4 UNIT(S): at 13:23

## 2022-06-29 RX ADMIN — Medication 1: at 13:22

## 2022-06-29 RX ADMIN — SODIUM CHLORIDE 75 MILLILITER(S): 9 INJECTION INTRAMUSCULAR; INTRAVENOUS; SUBCUTANEOUS at 01:16

## 2022-06-29 RX ADMIN — Medication 100 MILLIGRAM(S): at 11:21

## 2022-06-29 RX ADMIN — Medication 6 UNIT(S): at 17:34

## 2022-06-29 RX ADMIN — HEPARIN SODIUM 5000 UNIT(S): 5000 INJECTION INTRAVENOUS; SUBCUTANEOUS at 17:33

## 2022-06-29 RX ADMIN — Medication 1: at 09:30

## 2022-06-29 RX ADMIN — LISINOPRIL 5 MILLIGRAM(S): 2.5 TABLET ORAL at 14:40

## 2022-06-29 RX ADMIN — PANTOPRAZOLE SODIUM 40 MILLIGRAM(S): 20 TABLET, DELAYED RELEASE ORAL at 11:21

## 2022-06-29 RX ADMIN — LISINOPRIL 10 MILLIGRAM(S): 2.5 TABLET ORAL at 05:11

## 2022-06-29 RX ADMIN — CLOPIDOGREL BISULFATE 75 MILLIGRAM(S): 75 TABLET, FILM COATED ORAL at 11:21

## 2022-06-29 RX ADMIN — ATORVASTATIN CALCIUM 40 MILLIGRAM(S): 80 TABLET, FILM COATED ORAL at 21:42

## 2022-06-29 RX ADMIN — PANTOPRAZOLE SODIUM 40 MILLIGRAM(S): 20 TABLET, DELAYED RELEASE ORAL at 01:16

## 2022-06-29 RX ADMIN — Medication 50 MILLIGRAM(S): at 05:11

## 2022-06-29 RX ADMIN — Medication 81 MILLIGRAM(S): at 11:22

## 2022-06-29 RX ADMIN — BUPROPION HYDROCHLORIDE 150 MILLIGRAM(S): 150 TABLET, EXTENDED RELEASE ORAL at 11:22

## 2022-06-29 RX ADMIN — Medication 1: at 17:34

## 2022-06-29 RX ADMIN — SODIUM CHLORIDE 75 MILLILITER(S): 9 INJECTION INTRAMUSCULAR; INTRAVENOUS; SUBCUTANEOUS at 07:00

## 2022-06-29 NOTE — PROGRESS NOTE ADULT - ASSESSMENT
89 yo female with PMHX DM, HTN, HLD, CAD present to ED s/p syncopal episode     - episode was felt to be most likely vagally mediated, with epistaxis earlier in the day  - that being said, patient with LBBB (of unclear chronicity) and reported CAD  - has also severely orthostatic, with drop in sbp of ~50mmHg, though her sbp when measured was 120 upright, better as of yesterday  - would continue to watch on telemetry  - echocardiogram with hyperdynamic lv function  - noting orthostatic bps would encourage adequate hydration, compression stockings. would not add midodrine or florinef noting generally adequate upright bp documented so far, and baseline hypertension to 170s  - need to obtain prior cardiac history, and may end up needing a pharm stress during the admission, though thus far no immediate need for ischemic eval noted.  - ep evaluation noted   - cont asa, plavix (remains on dapt?) and statin  - occult blood positive, though had epistaxis. Monitor hb closely, as it has dropped since admission  - cont metoprolol and lisinopril, which have been uptitrated  - trend creatinine and electrolytes. Keep K>4, mg>2  - will follow with you

## 2022-06-29 NOTE — PROGRESS NOTE ADULT - ASSESSMENT
88 year old RH female      h/o HTN, HLD, DM, CAD with stents     who is presenting as a transfer from [place] for evaluation of possible seizure episode./  and  s/p  syncope   LOC episode with prodrome of darkening of vision, sweating, nausea with history of  cad, need  to  r/o  cardiac  causes   per  neuro,  no  seizures meds    tele.  r/o  arrythmia   echo/  card d r golyan   HYN/ HLD  on  asa.  lisinoril, norvasc   CAD/  stents, on asa./ plavix/  lipitor   DM,  follow  fs, on  janumet   depression, on bupropion,  lexapro  on  dvt  ppx   +  orthostatic,  on iv fluids    echo, normal  ef   anemia,  guaiac  +/  anmeia  from  acute  blood loss/  gi called          88 year old RH female      h/o HTN, HLD, DM, CAD with stents     who is presenting as a transfer from [place] for evaluation of possible seizure episode./  and  s/p  syncope   LOC episode with prodrome of darkening of vision, sweating, nausea with history of  cad, need  to  r/o  cardiac  causes   per  neuro,  no  seizures meds    tele.  r/o  arrythmia   echo/  card d r golyan   HYN/ HLD  on  asa.  lisinoril, norvasc   CAD/  stents, on asa./ plavix/  lipitor   DM,  follow  fs, on  janumet   depression, on bupropion,  lexapro  on  dvt  ppx   +  orthostatic,  on iv fluids    echo, normal  ef   anemia,  guaiac  +/   ?  from her epistaxix/   / hb is 9/  daughetr at bedside/  [facility] called

## 2022-06-29 NOTE — PROGRESS NOTE ADULT - ASSESSMENT
88 year old RH female with a past medical history of HTN, HLD, DM, CAD with stents who is presenting as a transfer from [place] for evaluation of possible seizure episode.    At baseline, the patient is ambulatory with a cane when she goes outside, lives alone and performs her own ADLs. The patient had an episode of nose bleeding for which she first presented to Urgent Care and then went to [facility] ED where her bleeding spontaneously stopped and she was discharged home. She then went home and was sitting down and eating a pastry when she told her daughter who was present that the patient felt like she was going to faint. The patient stated she had symptoms of her vision darkening, a symptom of epigastric discomfort and nausea, sweating and then had lost consciousness. As per the daughter, the patient began to lean back, her eyes rolled back, and to avoid falling backwards and vomiting was pushed forward by the daughter. The episode lasted several seconds and then the patient regained consciousness. EMS was called.    The patient then seemed slightly disoriented  which lasted several minutes and had completely resolved by the time that EMS had arrived. It was noted that there was some liquid by the patient which was concerning for urinary incontinence however it unclear as the patient stated that her undergarments and dress were dry. The patient was then brought to the ED at [place] and was to undergo cardiac workup and was transferred to [facility] as there was no EEG monitoring. Currently, the patient states that she feels well and denied headaches, visual changes, auditory changes, numbness, tingling, weakness.  Patient notes that in the past she had a fall several months ago while she was ambulating and thinks that she may have lost consciousness during that time.  Patient stated that in the past when she was 10 years old, she was diagnosed with seizures. These episodes were marked by not being able to speak for a brief period of time and disorientation. Stated that she feels that she might remember these episodes but was unsure. She denied taking medications for these events and noted that when she underwent menarche, these episodes stopped and she has not had any since.  Is taking Wellbutrin and has been taking the same dose for 16 years as per daughter.   pt with sig cardiac hx s/p multiple stents ?new LBBB  tele nsr  echo  may need ischemia goldstein if not done recently  will adjust cardiac meds  severe orthostatic hypotension, keep sbp >140 at rest sitting  clive stocking  am cortisol lervel  echo noted with septal hypertrophy but no lvot gradient  orthostatisms is improved, iv hydration  no calcium channel blocker sec to orthostatic hypotension  ischemia goldstein as per card

## 2022-06-29 NOTE — PROGRESS NOTE ADULT - SUBJECTIVE AND OBJECTIVE BOX
emmanuelle    REVIEW OF SYSTEMS:  GEN: no fever,    no chills  RESP: no SOB,   no cough  CVS: no chest pain,   no palpitations  GI: no abdominal pain,   no nausea,   no vomiting,   no constipation,   no diarrhea  : no dysuria,   no frequency  NEURO: no headache,   no dizziness  PSYCH: no depression,   not anxious  Derm : no rash    MEDICATIONS  (STANDING):  allopurinol 100 milliGRAM(s) Oral daily  aspirin enteric coated 81 milliGRAM(s) Oral daily  atorvastatin 40 milliGRAM(s) Oral at bedtime  buPROPion XL (24-Hour) . 150 milliGRAM(s) Oral daily  clopidogrel Tablet 75 milliGRAM(s) Oral daily  dextrose 5%. 1000 milliLiter(s) (50 mL/Hr) IV Continuous <Continuous>  dextrose 5%. 1000 milliLiter(s) (100 mL/Hr) IV Continuous <Continuous>  dextrose 50% Injectable 25 Gram(s) IV Push once  dextrose 50% Injectable 12.5 Gram(s) IV Push once  dextrose 50% Injectable 25 Gram(s) IV Push once  escitalopram 10 milliGRAM(s) Oral daily  glucagon  Injectable 1 milliGRAM(s) IntraMuscular once  heparin   Injectable 5000 Unit(s) SubCutaneous every 12 hours  insulin glargine Injectable (LANTUS) 6 Unit(s) SubCutaneous at bedtime  insulin lispro (ADMELOG) corrective regimen sliding scale   SubCutaneous three times a day before meals  insulin lispro (ADMELOG) corrective regimen sliding scale   SubCutaneous at bedtime  insulin lispro Injectable (ADMELOG) 4 Unit(s) SubCutaneous three times a day before meals  lisinopril 15 milliGRAM(s) Oral daily  metoprolol succinate ER 50 milliGRAM(s) Oral daily  sodium chloride 0.9%. 1000 milliLiter(s) (75 mL/Hr) IV Continuous <Continuous>    MEDICATIONS  (PRN):  dextrose Oral Gel 15 Gram(s) Oral once PRN Blood Glucose LESS THAN 70 milliGRAM(s)/deciliter      Vital Signs Last 24 Hrs  T(C): 36.6 (2022 04:52), Max: 36.6 (2022 20:51)  T(F): 97.9 (2022 04:52), Max: 97.9 (2022 04:52)  HR: 70 (2022 04:52) (70 - 73)  BP: 167/69 (2022 04:52) (130/71 - 167/69)  BP(mean): --  RR: 18 (2022 04:52) (18 - 18)  SpO2: 95% (2022 04:52) (95% - 97%)  CAPILLARY BLOOD GLUCOSE      POCT Blood Glucose.: 187 mg/dL (2022 21:29)  POCT Blood Glucose.: 148 mg/dL (2022 17:16)  POCT Blood Glucose.: 204 mg/dL (2022 08:58)    I&O's Summary    2022 07:01  -  2022 07:00  --------------------------------------------------------  IN: 2140 mL / OUT: 1 mL / NET: 2139 mL        PHYSICAL EXAM:  HEAD:  Atraumatic, Normocephalic  NECK: Supple, No   JVD  CHEST/LUNG:   no     rales,     no,    rhonchi  HEART: Regular rate and rhythm;         murmur  ABDOMEN: Soft, Nontender, ;   EXTREMITIES:     no   edema  NEUROLOGY:  alert    LABS:                        9.5    6.98  )-----------( 146      ( 2022 01:20 )             30.0         138  |  104  |  16  ----------------------------<  156<H>  4.3   |  23  |  0.68    Ca    8.5      2022 07:18            Urinalysis Basic - ( 2022 03:08 )    Color: Light Yellow / Appearance: Clear / S.010 / pH: x  Gluc: x / Ketone: Negative  / Bili: Negative / Urobili: Negative   Blood: x / Protein: Negative / Nitrite: Negative   Leuk Esterase: Large / RBC: 1 /hpf / WBC 97 /HPF   Sq Epi: x / Non Sq Epi: 1 /hpf / Bacteria: Negative              Thyroid Stimulating Hormone, Serum: 2.47 uIU/mL ( @ 07:19)          Consultant(s) Notes Reviewed:      Care Discussed with Consultants/Other Providers:

## 2022-06-29 NOTE — CONSULT NOTE ADULT - SUBJECTIVE AND OBJECTIVE BOX
Patient is a 88y old  Female who presents with a chief complaint of syncope/ ?  seizures (2022 08:45)      HPI:  88 year old RH female     with a past medical history of HTN, HLD, DM, CAD with stents     who is presenting as a transfer from [place] for evaluation of possible seizure episode.    At baseline, the patient is ambulatory with a cane when she goes outside, lives alone and performs her own ADLs. The patient had an episode of nosebleeding for which she first presented to Urgent Care and then went to [facility] ED where her bleeding spontaneously stopped and she was discharged home. She then went home and was sitting down and eating a pastry when she told her daughter who was present that the patient felt like she was going to faint. The patient stated she had symptoms of her vision darkening, a symptom of epigastric discomfort and nausea, sweating and then had lost consciousness. As per the daughter, the patient began to lean back, her eyes rolled back, and to avoid falling backwards and vomiting was pushed forward by the daughter. The episode lasted several seconds and then the patient regained consciousness. EMS was called.    The patient then seemed slightly disoriented  which lasted several minutes and had completely resolved by the time that EMS had arrived. It was noted that there was some liquid by the patient which was concerning for urinary incontinence however it unclear as the patient stated that her undergarments and dress were dry. The patient was then brought to the ED at [place] and was to undergo cardiac workup and was transferred to [facility] as there was no EEG monitoring. Currently, the patient states that she feels well and denied headaches, visual changes, auditory changes, numbness, tingling, weakness.  Patient notes that in the past she had a fall several months ago while she was ambulating and thinks that she may have lost consciousness during that time.  Patient stated that in the past when she was 10 years old, she was diagnosed with seizures. These episodes were marked by not being able to speak for a brief period of time and disorientation. Stated that she feels that she might remember these episodes but was unsure. She denied taking medications for these events and noted that when she underwent menarche, these episodes stopped and she has not had any since.  Is taking Wellbutrin and has been taking the same dose for 16 years as per daughter.     NIHSS: 0  MRS: 0     (2022 07:07)    Admitted with ?syncope/seizure following episode of significant epistaxis (right nare) pt states had significant amount of bleeding from noes, and spit out blood from mouth as well as coud feel herself swallowing blood - evaluated at [place] ED 22  Noted this admission with orthostatic changes and LBB  no CP or SOB    prior to admission stools were brown, soft  first BM since admission was last night and was noted to be dark and formed/melenic color - started on PPI and GI consult called    Reports hx PUD 2 years ago - EGD at [facility] (states she lost front tooth during procedure and had to get a dental implant)  states was on PUD rx for some time and then advised to stop; not on PPI prior to admission  no nausea or vomiting, no abdominal pain  has known CAD with stents, on DAPT    PAST MEDICAL & SURGICAL HISTORY:  Diabetes  Hypertension, unspecified type  Stented coronary artery  Hyperlipidemia, unspecified hyperlipidemia type          Allergies  No Known Allergies      MEDICATIONS  (STANDING):  allopurinol 100 milliGRAM(s) Oral daily  aspirin enteric coated 81 milliGRAM(s) Oral daily  atorvastatin 40 milliGRAM(s) Oral at bedtime  buPROPion XL (24-Hour) . 150 milliGRAM(s) Oral daily  clopidogrel Tablet 75 milliGRAM(s) Oral daily  dextrose 5%. 1000 milliLiter(s) (50 mL/Hr) IV Continuous <Continuous>  dextrose 5%. 1000 milliLiter(s) (100 mL/Hr) IV Continuous <Continuous>  dextrose 50% Injectable 25 Gram(s) IV Push once  dextrose 50% Injectable 12.5 Gram(s) IV Push once  dextrose 50% Injectable 25 Gram(s) IV Push once  escitalopram 10 milliGRAM(s) Oral daily  glucagon  Injectable 1 milliGRAM(s) IntraMuscular once  heparin   Injectable 5000 Unit(s) SubCutaneous every 12 hours  insulin glargine Injectable (LANTUS) 6 Unit(s) SubCutaneous at bedtime  insulin lispro (ADMELOG) corrective regimen sliding scale   SubCutaneous three times a day before meals  insulin lispro (ADMELOG) corrective regimen sliding scale   SubCutaneous at bedtime  insulin lispro Injectable (ADMELOG) 4 Unit(s) SubCutaneous three times a day before meals  lisinopril 15 milliGRAM(s) Oral daily  metoprolol succinate ER 50 milliGRAM(s) Oral daily  pantoprazole    Tablet 40 milliGRAM(s) Oral before breakfast  sodium chloride 0.9%. 1000 milliLiter(s) (75 mL/Hr) IV Continuous <Continuous>    MEDICATIONS  (PRN):  dextrose Oral Gel 15 Gram(s) Oral once PRN Blood Glucose LESS THAN 70 milliGRAM(s)/deciliter      Social History:  no tobacco, ETOH or drug use  lives with family    Family History   IBD (  ) Yes   (X  ) No  GI Malignancy (  )  Yes    ( X ) No      Advanced Directives: (  X   ) None    (      ) DNR    (     ) DNI    (     ) Health Care Proxy:     Review of Systems:    General:  No wt loss, fevers, chills, night sweats,fatigue,   CV:  No pain, +CAD s/p stents +LBBB +HTN ; see HPI  Resp:  No dyspnea, cough, tachypnea, wheezing  GI:  see HPI  :  No pain, bleeding, incontinence, nocturia  Muscle:  No pain, weakness  Neuro:  No weakness, tingling, memory problems  Psych:  No fatigue, insomnia, mood problems, depression  Endocrine:  No polyuria, polydypsia, cold/heat intolerance  Heme:  No petechiae, ecchymosis, easy bruisability  +epistaxis   Skin:  No rash, tattoos, scars, edema      Vital Signs Last 24 Hrs  T(C): 36.4 (2022 11:17), Max: 36.6 (2022 20:51)  T(F): 97.5 (2022 11:17), Max: 97.9 (2022 04:52)  HR: 65 (2022 11:17) (65 - 73)  BP: 126/66 (2022 11:17) (126/66 - 167/69)  BP(mean): --  RR: 18 (2022 11:17) (18 - 18)  SpO2: 95% (2022 11:17) (95% - 97%)    PHYSICAL EXAM:    Constitutional: NAD, well-developed elderly female  sitting OOB to chair   Neck: No LAD, supple no JVD  Respiratory: Clear b/l no accessory muscle use  Cardiovascular: S1 and S2, RRR  Gastrointestinal: BS+, soft, NT/ND, neg HSM  Extremities: No peripheral edema, neg clubbing, cyanosis  Vascular: 2+ peripheral pulses  Neurological: A/O x 3, no focal deficits  Psychiatric: Normal mood, normal affect  Skin: No rashes, anicteric        LABS:                        9.5    6.98  )-----------( 146      ( 2022 01:20 )             30.0   Hemoglobin: 10.1 g/dL (22 @ 07:17)   Hemoglobin: 10.3 g/dL (22 @ 02:27)   Hemoglobin: 11.5 g/dL (22 @ 14:30)         138  |  104  |  16  ----------------------------<  156<H>  4.3   |  23  |  0.68    Ca    8.5      2022 07:18        Urinalysis Basic - ( 2022 03:08 )    Color: Light Yellow / Appearance: Clear / S.010 / pH: x  Gluc: x / Ketone: Negative  / Bili: Negative / Urobili: Negative   Blood: x / Protein: Negative / Nitrite: Negative   Leuk Esterase: Large / RBC: 1 /hpf / WBC 97 /HPF   Sq Epi: x / Non Sq Epi: 1 /hpf / Bacteria: Negative      LIVER FUNCTIONS - ( 2022 02:27 )  Alb: 3.5 g/dL / Pro: 6.6 g/dL / ALK PHOS: 57 U/L / ALT: 13 U/L / AST: 13 U/L / GGT: x             RADIOLOGY & ADDITIONAL TESTS:    ACC: [id number] EXAM:  XR CHEST PORTABLE URGENT 1V                        PROCEDURE DATE:  2022      INTERPRETATION:  INDICATION: Syncope    COMPARISON: None.    Technique: AP radiograph the chest    FINDINGS:  Heart/Vascular: Heart size cannot be appropriately assessed in this   projection.  Pulmonary: No focal consolidation. No pleural effusion or pneumothorax.  Bones: No acute bony finding.    Impression:  No active pulmonary disease.    ACC: [id number] EXAM:  CT BRAIN                        PROCEDURE DATE:  2022      INTERPRETATION:  CLINICAL INDICATION: Nontraumatic seizure    TECHNIQUE: Axial CT scanning of the brain was obtained from the skull   base to the vertex without the administration of intravenous contrast.   Reformatted coronal and sagittal images were subsequently obtained and   reviewed.    COMPARISON: 3/4/2020    FINDINGS:  There is no CT evidence of acute transcortical infarct. Age-related   involutional changes and chronic microvascular ischemic changes.    There is no hydrocephalus, mass effect, or acute intracranial hemorrhage.   No extra-axial collection. Basal cisterns are patent.    The visualized paranasal sinuses and mastoid air cells are clear.    The calvarium is intact.    Bilateral cataract surgery.    IMPRESSION:  No hydrocephalus, acute intracranial hemorrhage, or mass effect.

## 2022-06-29 NOTE — PROGRESS NOTE ADULT - SUBJECTIVE AND OBJECTIVE BOX
E L E C T R [facility]  P H Y S I O L O G Y     PROGRESS  NOTE   ________________________________________________    CHIEF COMPLAINT:Patient is a 88y old  Female who presents with a chief complaint of syncope/ ?  seizures (28 Jun 2022 13:18)  no complain.  	  REVIEW OF SYSTEMS:  CONSTITUTIONAL: No fever, weight loss, or fatigue  EYES: No eye pain, visual disturbances, or discharge  ENT:  No difficulty hearing, tinnitus, vertigo; No sinus or throat pain  NECK: No pain or stiffness  RESPIRATORY: No cough, wheezing, chills or hemoptysis; No Shortness of Breath  CARDIOVASCULAR: No chest pain, palpitations, passing out, dizziness, or leg swelling  GASTROINTESTINAL: No abdominal or epigastric pain. No nausea, vomiting, or hematemesis; No diarrhea or constipation. No melena or hematochezia.  GENITOURINARY: No dysuria, frequency, hematuria, or incontinence  NEUROLOGICAL: No headaches, memory loss, loss of strength, numbness, or tremors  SKIN: No itching, burning, rashes, or lesions   LYMPH Nodes: No enlarged glands  ENDOCRINE: No heat or cold intolerance; No hair loss  MUSCULOSKELETAL: No joint pain or swelling; No muscle, back, or extremity pain  PSYCHIATRIC: No depression, anxiety, mood swings, or difficulty sleeping  HEME/LYMPH: No easy bruising, or bleeding gums  ALLERGY AND IMMUNOLOGIC: No hives or eczema	    [ ] All others negative	  [ ] Unable to obtain    PHYSICAL EXAM:  T(C): 36.6 (06-29-22 @ 04:52), Max: 36.6 (06-28-22 @ 20:51)  HR: 70 (06-29-22 @ 04:52) (70 - 73)  BP: 167/69 (06-29-22 @ 04:52) (130/71 - 167/69)  RR: 18 (06-29-22 @ 04:52) (18 - 18)  SpO2: 95% (06-29-22 @ 04:52) (95% - 97%)  Wt(kg): --  I&O's Summary    27 Jun 2022 07:01  -  28 Jun 2022 07:00  --------------------------------------------------------  IN: 320 mL / OUT: 800 mL / NET: -480 mL    28 Jun 2022 07:01  -  29 Jun 2022 06:52  --------------------------------------------------------  IN: 2140 mL / OUT: 1 mL / NET: 2139 mL        Appearance: Normal	  HEENT:   Normal oral mucosa, PERRL, EOMI	  Lymphatic: No lymphadenopathy  Cardiovascular: Normal S1 S2, No JVD, +murmurs, No edema  Respiratory: rhonchi  Psychiatry: A & O x 3, Mood & affect appropriate  Gastrointestinal:  Soft, Non-tender, + BS	  Skin: No rashes, No ecchymoses, No cyanosis	  Neurologic: Non-focal  Extremities: Normal range of motion, No clubbing, cyanosis or edema  Vascular: Peripheral pulses palpable 2+ bilaterally    MEDICATIONS  (STANDING):  allopurinol 100 milliGRAM(s) Oral daily  aspirin enteric coated 81 milliGRAM(s) Oral daily  atorvastatin 40 milliGRAM(s) Oral at bedtime  buPROPion XL (24-Hour) . 150 milliGRAM(s) Oral daily  clopidogrel Tablet 75 milliGRAM(s) Oral daily  dextrose 5%. 1000 milliLiter(s) (50 mL/Hr) IV Continuous <Continuous>  dextrose 5%. 1000 milliLiter(s) (100 mL/Hr) IV Continuous <Continuous>  dextrose 50% Injectable 25 Gram(s) IV Push once  dextrose 50% Injectable 12.5 Gram(s) IV Push once  dextrose 50% Injectable 25 Gram(s) IV Push once  escitalopram 10 milliGRAM(s) Oral daily  glucagon  Injectable 1 milliGRAM(s) IntraMuscular once  heparin   Injectable 5000 Unit(s) SubCutaneous every 12 hours  insulin glargine Injectable (LANTUS) 6 Unit(s) SubCutaneous at bedtime  insulin lispro (ADMELOG) corrective regimen sliding scale   SubCutaneous three times a day before meals  insulin lispro (ADMELOG) corrective regimen sliding scale   SubCutaneous at bedtime  insulin lispro Injectable (ADMELOG) 4 Unit(s) SubCutaneous three times a day before meals  lisinopril 10 milliGRAM(s) Oral daily  metoprolol succinate ER 50 milliGRAM(s) Oral daily  sodium chloride 0.9%. 1000 milliLiter(s) (75 mL/Hr) IV Continuous <Continuous>      TELEMETRY: 	    ECG:  	  RADIOLOGY:  OTHER: 	  	  LABS:	 	    CARDIAC MARKERS:                                9.5    6.98  )-----------( 146      ( 29 Jun 2022 01:20 )             30.0     06-28    138  |  104  |  16  ----------------------------<  156<H>  4.3   |  23  |  0.68    Ca    8.5      28 Jun 2022 07:18      proBNP:   Lipid Profile:   HgA1c:   TSH: Thyroid Stimulating Hormone, Serum: 2.47 uIU/mL (06-28 @ 07:19)    < from: Transthoracic Echocardiogram (06.28.22 @ 11:17) >  Mitral Valve: Mitral annular calcification. Mild mitral  regurgitation.  Peak mitral valve gradient equals 6 mm Hg,  mean transmitral valve gradient equals 2 mm Hg.  Aortic Valve/Aorta: Normal trileaflet aortic valve. Peak  transaortic valve gradient equals 7 mm Hg, mean transaortic  valve gradient equals 3 mm Hg, aortic valve velocity time  integral equals 27 cm. Minimal aortic regurgitation.  Peak  left ventricular outflow tract gradient equals 6mm Hg,  mean gradient is equal to 3 mm Hg, LVOT velocity time  integral equals 23 cm.  Aortic Root: 3.7 cm.  Left Atrium: Normal left atrium.  LA volume index = 33  cc/m2.  Left Ventricle: Hyperdynamic left ventricular systolic  function. Septal hypertrophy. Mild diastolic dysfunction  (Stage I).  Right Heart: Normal right atrium. Normal right ventricular  size and function. Normal tricuspid valve. Normal pulmonic  valve. Minimal pulmonic regurgitation.  Pericardium/Pleura: Normal pericardium with no pericardial  effusion.  Hemodynamic: Estimated right atrial pressure is 8 mm Hg.  Color Doppler demonstrates no evidence of a patent foramen  ovale.  ------------------------------------------------------------------------  Conclusions:  1. Septal hypertrophy.  2. Hyperdynamic left ventricular systolic function.  3. Mild diastolic dysfunction (Stage I).  4. Normal right ventricular size and function.

## 2022-06-29 NOTE — CONSULT NOTE ADULT - ASSESSMENT
89 yo female with PMHX DM, HTN, HLD, CAD present to ED 6/27/22 s/p syncopal episode. Noted to have significant epistaxis 6/26 (bleeding from nose, spit blood through mouth and swallowed blood per pt) seen in ED 6/26  now noted to have dark BM FOBT + 6/28/22 PM  BMs prior to admission were brown  Remote history of PUD ([facility] 2 years ago)    #FOBT + anemia - suspect 2/2 passage of swallowed blood from GI tract; prior to epistaxis (while on DAPT) stools have been brown  -PO PPI  -no role for endoscopic evaluation at this time  -monitor BMs; expect stools to become progressively lighter/more brown over next 1-3 stools  -monitor CBC closely  -will attempt to obtain prior records from [facility]    Cardiology and EP following; work-up in progress    Discussed with pt, all questions answered  Will update pt's daughter per pt request  Discussed with Medicine team    Thank you for the courtesy of this consult.    Francis Lorenzo PA-C    [facility]  [phone]  After hours and weekend coverage [phone]     30-Jan-2020

## 2022-06-29 NOTE — PROGRESS NOTE ADULT - ASSESSMENT
Assessment  DMT2: 88y Female with DM T2 with hyperglycemia, A1C 7.6%, was on oral meds/Janumet at home, now on low-dose basal bolus insulin, adjusted dose yesterday, blood sugars are improving though still not at target/running high postprandially, no hypoglycemic episodes, eating meals, no acute events.  Syncope: AM cortisol WNL, workup in progress, monitored.  HTN: Controlled,  on antihypertensive medications.  HLD: On statin.      Jaspal Augustine MD  Cell: [phone]  Office: [phone]       Assessment  DMT2: 88y Female with DM T2 with hyperglycemia, A1C 7.6%, was on oral meds/Janumet at home, now on low-dose basal bolus insulin, adjusted dose yesterday, blood sugars are improving though still not at target/running high postprandially,  no hypoglycemic episodes, eating meals, no acute events.  Syncope: AM cortisol WNL, workup in progress, monitored.  HTN: Controlled,  on antihypertensive medications.  HLD: On statin.      Jaspal Augustine MD  Cell: [phone]  Office: [phone]

## 2022-06-29 NOTE — PROGRESS NOTE ADULT - SUBJECTIVE AND OBJECTIVE BOX
Chief complaint  Patient is a 88y old  Female who presents with a chief complaint of syncope/ ?  seizures (29 Jun 2022 11:30)   Review of systems  Patient in bed, looks comfortable, no hypoglycemic episodes.    Labs and Fingersticks  CAPILLARY BLOOD GLUCOSE      POCT Blood Glucose.: 200 mg/dL (29 Jun 2022 12:44)  POCT Blood Glucose.: 168 mg/dL (29 Jun 2022 09:10)  POCT Blood Glucose.: 187 mg/dL (28 Jun 2022 21:29)  POCT Blood Glucose.: 148 mg/dL (28 Jun 2022 17:16)      Anion Gap, Serum: 11 (06-28 @ 07:18)      Calcium, Total Serum: 8.5 (06-28 @ 07:18)          06-28    138  |  104  |  16  ----------------------------<  156<H>  4.3   |  23  |  0.68    Ca    8.5      28 Jun 2022 07:18                          9.5    6.98  )-----------( 146      ( 29 Jun 2022 01:20 )             30.0     Medications  MEDICATIONS  (STANDING):  allopurinol 100 milliGRAM(s) Oral daily  aspirin enteric coated 81 milliGRAM(s) Oral daily  atorvastatin 40 milliGRAM(s) Oral at bedtime  buPROPion XL (24-Hour) . 150 milliGRAM(s) Oral daily  clopidogrel Tablet 75 milliGRAM(s) Oral daily  dextrose 5%. 1000 milliLiter(s) (50 mL/Hr) IV Continuous <Continuous>  dextrose 5%. 1000 milliLiter(s) (100 mL/Hr) IV Continuous <Continuous>  dextrose 50% Injectable 25 Gram(s) IV Push once  dextrose 50% Injectable 12.5 Gram(s) IV Push once  dextrose 50% Injectable 25 Gram(s) IV Push once  escitalopram 10 milliGRAM(s) Oral daily  glucagon  Injectable 1 milliGRAM(s) IntraMuscular once  heparin   Injectable 5000 Unit(s) SubCutaneous every 12 hours  insulin glargine Injectable (LANTUS) 6 Unit(s) SubCutaneous at bedtime  insulin lispro (ADMELOG) corrective regimen sliding scale   SubCutaneous three times a day before meals  insulin lispro (ADMELOG) corrective regimen sliding scale   SubCutaneous at bedtime  insulin lispro Injectable (ADMELOG) 6 Unit(s) SubCutaneous three times a day before meals  metoprolol succinate ER 50 milliGRAM(s) Oral daily  pantoprazole    Tablet 40 milliGRAM(s) Oral before breakfast      Physical Exam  General: Patient comfortable in bed  Vital Signs Last 12 Hrs  T(F): 97.2 (06-29-22 @ 12:17), Max: 97.9 (06-29-22 @ 04:52)  HR: 68 (06-29-22 @ 14:37) (65 - 70)  BP: 146/68 (06-29-22 @ 14:37) (126/66 - 167/69)  BP(mean): --  RR: 18 (06-29-22 @ 11:17) (18 - 18)  SpO2: 95% (06-29-22 @ 11:17) (95% - 95%)  Neck: No palpable thyroid nodules.  CVS: S1S2, No murmurs  Respiratory: No wheezing, no crepitations  GI: Abdomen soft, bowel sounds positive  Musculoskeletal:  edema lower extremities.   Skin: No skin rashes, no ecchymosis    Diagnostics    A1C with Estimated Average Glucose: Routine (06-28 @ 13:18)  Free Thyroxine, Serum: AM Sched. Collection: 28-Jun-2022 06:00 (06-27 @ 14:03)  Thyroid Stimulating Hormone, Serum: AM Sched. Collection: 28-Jun-2022 06:00 (06-27 @ 14:03)  A1C with Estimated Average Glucose: Routine (06-27 @ 14:03)           Chief complaint  Patient is a 88y old  Female who presents with a chief complaint of syncope/ ?  seizures (29 Jun 2022 11:30)   Review of systems  Patient in bed, looks comfortable, no hypoglycemic episodes.    Labs and Fingersticks  CAPILLARY BLOOD GLUCOSE      POCT Blood Glucose.: 200 mg/dL (29 Jun 2022 12:44)  POCT Blood Glucose.: 168 mg/dL (29 Jun 2022 09:10)  POCT Blood Glucose.: 187 mg/dL (28 Jun 2022 21:29)  POCT Blood Glucose.: 148 mg/dL (28 Jun 2022 17:16)      Anion Gap, Serum: 11 (06-28 @ 07:18)      Calcium, Total Serum: 8.5 (06-28 @ 07:18)          06-28    138  |  104  |  16  ----------------------------<  156<H>  4.3   |  23  |  0.68    Ca    8.5      28 Jun 2022 07:18                          9.5    6.98  )-----------( 146      ( 29 Jun 2022 01:20 )             30.0     Medications  MEDICATIONS  (STANDING):  allopurinol 100 milliGRAM(s) Oral daily  aspirin enteric coated 81 milliGRAM(s) Oral daily  atorvastatin 40 milliGRAM(s) Oral at bedtime  buPROPion XL (24-Hour) . 150 milliGRAM(s) Oral daily  clopidogrel Tablet 75 milliGRAM(s) Oral daily  dextrose 5%. 1000 milliLiter(s) (50 mL/Hr) IV Continuous <Continuous>  dextrose 5%. 1000 milliLiter(s) (100 mL/Hr) IV Continuous <Continuous>  dextrose 50% Injectable 25 Gram(s) IV Push once  dextrose 50% Injectable 12.5 Gram(s) IV Push once  dextrose 50% Injectable 25 Gram(s) IV Push once  escitalopram 10 milliGRAM(s) Oral daily  glucagon  Injectable 1 milliGRAM(s) IntraMuscular once  heparin   Injectable 5000 Unit(s) SubCutaneous every 12 hours  insulin glargine Injectable (LANTUS) 6 Unit(s) SubCutaneous at bedtime  insulin lispro (ADMELOG) corrective regimen sliding scale   SubCutaneous three times a day before meals  insulin lispro (ADMELOG) corrective regimen sliding scale   SubCutaneous at bedtime  insulin lispro Injectable (ADMELOG) 6 Unit(s) SubCutaneous three times a day before meals  metoprolol succinate ER 50 milliGRAM(s) Oral daily  pantoprazole    Tablet 40 milliGRAM(s) Oral before breakfast      Physical Exam  General: Patient comfortable in bed  Vital Signs Last 12 Hrs  T(F): 97.2 (06-29-22 @ 12:17), Max: 97.9 (06-29-22 @ 04:52)  HR: 68 (06-29-22 @ 14:37) (65 - 70)  BP: 146/68 (06-29-22 @ 14:37) (126/66 - 167/69)  BP(mean): --  RR: 18 (06-29-22 @ 11:17) (18 - 18)  SpO2: 95% (06-29-22 @ 11:17) (95% - 95%)  Neck: No palpable thyroid nodules.  CVS: S1S2, No murmurs  Respiratory: No wheezing, no crepitations  GI: Abdomen soft, bowel sounds positive  Musculoskeletal:  edema lower extremities.   Skin: No skin rashes, no ecchymosis    Diagnostics    A1C with Estimated Average Glucose: Routine (06-28 @ 13:18)  Free Thyroxine, Serum: AM Sched. Collection: 28-Jun-2022 06:00 (06-27 @ 14:03)  Thyroid Stimulating Hormone, Serum: AM Sched. Collection: 28-Jun-2022 06:00 (06-27 @ 14:03)  A1C with Estimated Average Glucose: Routine (06-27 @ 14:03)

## 2022-06-30 DIAGNOSIS — R04.0 EPISTAXIS: ICD-10-CM

## 2022-06-30 LAB
APTT BLD: 30.2 SEC — SIGNIFICANT CHANGE UP (ref 27.5–35.5)
GLUCOSE BLDC GLUCOMTR-MCNC: 171 MG/DL — HIGH (ref 70–99)
GLUCOSE BLDC GLUCOMTR-MCNC: 195 MG/DL — HIGH (ref 70–99)
GLUCOSE BLDC GLUCOMTR-MCNC: 196 MG/DL — HIGH (ref 70–99)
GLUCOSE BLDC GLUCOMTR-MCNC: 210 MG/DL — HIGH (ref 70–99)
GLUCOSE BLDC GLUCOMTR-MCNC: 70 MG/DL — SIGNIFICANT CHANGE UP (ref 70–99)
HCT VFR BLD CALC: 30.2 % — LOW (ref 34.5–45)
HCT VFR BLD CALC: 31.6 % — LOW (ref 34.5–45)
HGB BLD-MCNC: 9.4 G/DL — LOW (ref 11.5–15.5)
HGB BLD-MCNC: 9.8 G/DL — LOW (ref 11.5–15.5)
INR BLD: 0.97 RATIO — SIGNIFICANT CHANGE UP (ref 0.88–1.16)
MCHC RBC-ENTMCNC: 27.7 PG — SIGNIFICANT CHANGE UP (ref 27–34)
MCHC RBC-ENTMCNC: 28 PG — SIGNIFICANT CHANGE UP (ref 27–34)
MCHC RBC-ENTMCNC: 31 GM/DL — LOW (ref 32–36)
MCHC RBC-ENTMCNC: 31.1 GM/DL — LOW (ref 32–36)
MCV RBC AUTO: 89.3 FL — SIGNIFICANT CHANGE UP (ref 80–100)
MCV RBC AUTO: 89.9 FL — SIGNIFICANT CHANGE UP (ref 80–100)
NRBC # BLD: 0 /100 WBCS — SIGNIFICANT CHANGE UP (ref 0–0)
NRBC # BLD: 0 /100 WBCS — SIGNIFICANT CHANGE UP (ref 0–0)
PLATELET # BLD AUTO: 146 K/UL — LOW (ref 150–400)
PLATELET # BLD AUTO: 154 K/UL — SIGNIFICANT CHANGE UP (ref 150–400)
PROTHROM AB SERPL-ACNC: 11.3 SEC — SIGNIFICANT CHANGE UP (ref 10.5–13.4)
RBC # BLD: 3.36 M/UL — LOW (ref 3.8–5.2)
RBC # BLD: 3.54 M/UL — LOW (ref 3.8–5.2)
RBC # FLD: 13 % — SIGNIFICANT CHANGE UP (ref 10.3–14.5)
RBC # FLD: 13.1 % — SIGNIFICANT CHANGE UP (ref 10.3–14.5)
WBC # BLD: 6.81 K/UL — SIGNIFICANT CHANGE UP (ref 3.8–10.5)
WBC # BLD: 7.18 K/UL — SIGNIFICANT CHANGE UP (ref 3.8–10.5)
WBC # FLD AUTO: 6.81 K/UL — SIGNIFICANT CHANGE UP (ref 3.8–10.5)
WBC # FLD AUTO: 7.18 K/UL — SIGNIFICANT CHANGE UP (ref 3.8–10.5)

## 2022-06-30 PROCEDURE — 95718 EEG PHYS/QHP 2-12 HR W/VEEG: CPT

## 2022-06-30 PROCEDURE — 99233 SBSQ HOSP IP/OBS HIGH 50: CPT | Mod: FS

## 2022-06-30 PROCEDURE — 99222 1ST HOSP IP/OBS MODERATE 55: CPT | Mod: FS,25

## 2022-06-30 PROCEDURE — 30903 CONTROL OF NOSEBLEED: CPT | Mod: RT

## 2022-06-30 PROCEDURE — 99232 SBSQ HOSP IP/OBS MODERATE 35: CPT

## 2022-06-30 PROCEDURE — ZZZZZ: CPT

## 2022-06-30 RX ORDER — LISINOPRIL 2.5 MG/1
20 TABLET ORAL DAILY
Refills: 0 | Status: DISCONTINUED | OUTPATIENT
Start: 2022-07-01 | End: 2022-07-02

## 2022-06-30 RX ORDER — LISINOPRIL 2.5 MG/1
5 TABLET ORAL ONCE
Refills: 0 | Status: COMPLETED | OUTPATIENT
Start: 2022-06-30 | End: 2022-06-30

## 2022-06-30 RX ORDER — INSULIN LISPRO 100/ML
8 VIAL (ML) SUBCUTANEOUS
Refills: 0 | Status: DISCONTINUED | OUTPATIENT
Start: 2022-06-30 | End: 2022-06-30

## 2022-06-30 RX ORDER — INSULIN GLARGINE 100 [IU]/ML
8 INJECTION, SOLUTION SUBCUTANEOUS AT BEDTIME
Refills: 0 | Status: DISCONTINUED | OUTPATIENT
Start: 2022-06-30 | End: 2022-07-05

## 2022-06-30 RX ORDER — LISINOPRIL 2.5 MG/1
20 TABLET ORAL DAILY
Refills: 0 | Status: DISCONTINUED | OUTPATIENT
Start: 2022-06-30 | End: 2022-06-30

## 2022-06-30 RX ORDER — INSULIN LISPRO 100/ML
6 VIAL (ML) SUBCUTANEOUS
Refills: 0 | Status: DISCONTINUED | OUTPATIENT
Start: 2022-06-30 | End: 2022-07-03

## 2022-06-30 RX ORDER — CEPHALEXIN 500 MG
500 CAPSULE ORAL EVERY 12 HOURS
Refills: 0 | Status: DISCONTINUED | OUTPATIENT
Start: 2022-06-30 | End: 2022-07-06

## 2022-06-30 RX ADMIN — HEPARIN SODIUM 5000 UNIT(S): 5000 INJECTION INTRAVENOUS; SUBCUTANEOUS at 19:00

## 2022-06-30 RX ADMIN — LISINOPRIL 5 MILLIGRAM(S): 2.5 TABLET ORAL at 13:30

## 2022-06-30 RX ADMIN — BUPROPION HYDROCHLORIDE 150 MILLIGRAM(S): 150 TABLET, EXTENDED RELEASE ORAL at 13:32

## 2022-06-30 RX ADMIN — Medication 6 UNIT(S): at 18:59

## 2022-06-30 RX ADMIN — PANTOPRAZOLE SODIUM 40 MILLIGRAM(S): 20 TABLET, DELAYED RELEASE ORAL at 06:40

## 2022-06-30 RX ADMIN — Medication 81 MILLIGRAM(S): at 13:32

## 2022-06-30 RX ADMIN — Medication 1: at 09:40

## 2022-06-30 RX ADMIN — Medication 6 UNIT(S): at 13:31

## 2022-06-30 RX ADMIN — Medication 6 UNIT(S): at 09:40

## 2022-06-30 RX ADMIN — LISINOPRIL 15 MILLIGRAM(S): 2.5 TABLET ORAL at 06:41

## 2022-06-30 RX ADMIN — Medication 100 MILLIGRAM(S): at 13:32

## 2022-06-30 RX ADMIN — ESCITALOPRAM OXALATE 10 MILLIGRAM(S): 10 TABLET, FILM COATED ORAL at 13:43

## 2022-06-30 RX ADMIN — Medication 50 MILLIGRAM(S): at 06:39

## 2022-06-30 RX ADMIN — Medication 1: at 13:32

## 2022-06-30 RX ADMIN — INSULIN GLARGINE 8 UNIT(S): 100 INJECTION, SOLUTION SUBCUTANEOUS at 22:26

## 2022-06-30 RX ADMIN — Medication 500 MILLIGRAM(S): at 22:26

## 2022-06-30 RX ADMIN — Medication 500 MILLIGRAM(S): at 06:39

## 2022-06-30 RX ADMIN — ATORVASTATIN CALCIUM 40 MILLIGRAM(S): 80 TABLET, FILM COATED ORAL at 22:26

## 2022-06-30 NOTE — CHART NOTE - NSCHARTNOTEFT_GEN_A_CORE
Notified by RN that patient had an acute onset of right nares epistaxis.  Patient stated similar episode occurred on Sunday.  ENT paged and patient is s/p cauterization of the right nares with nasal packing.  Patient is to complete a course of prophylactic antibiotics.  STAT CBC and coags ordered.  Will endorse to the primary team in the AM.      Beatrice Godoy PA-C  Department of Medicine  Spectra [phone] Notified by RN that patient had an acute onset of right nares epistaxis.  Patient stated similar episode occurred on Sunday.  ENT paged and patient is s/p cauterization of the right nares with nasal packing.  Patient is to complete a course of prophylactic antibiotics.  STAT CBC and coags ordered.  Hold AM SC Heparin and reevaluate at 12PM for Plavix.  Will endorse to the primary team in the AM.      Beatrice Godoy PA-C  Department of Medicine  Spectra [phone]

## 2022-06-30 NOTE — PROGRESS NOTE ADULT - ASSESSMENT
89 yo female with PMHX DM, HTN, HLD, CAD present to ED s/p syncopal episode     - episode was felt to be most likely vagally mediated, with epistaxis earlier in the day  - that being said, patient with LBBB (of unclear chronicity) and reported CAD  - has also severely orthostatic, with drop in sbp of ~50mmHg, now seemingly improved  - would continue to watch on telemetry  - echocardiogram with hyperdynamic lv function  - noting orthostatic bps would encourage adequate hydration, compression stockings. would not add midodrine or florinef noting generally adequate upright bp documented so far, and baseline hypertension to 170s  - cont metoprolol and lisinopril, which have been uptitrated  - cont check orthostatics. if remains neg would uptitrate bp meds as tolerated    - need to obtain prior cardiac history, and may end up needing a pharm stress during the admission, though thus far no immediate need for ischemic eval noted.  - ep evaluation noted     - cont asa, plavix (remains on dapt?).   - would dc plavix given epistaxis  - cont statin  - occult blood positive, though had epistaxis. Monitor hb closely, as it has dropped since admission    - trend creatinine and electrolytes. Keep K>4, mg>2  - will follow with you

## 2022-06-30 NOTE — PROGRESS NOTE ADULT - SUBJECTIVE AND OBJECTIVE BOX
ENT ISSUE/POD: Right Epistaxis    HPI: 87 YO F with PMH of  DM, HTN, HLD, CAD present to ED 6/27/22 s/p syncopal episode. ENT was consulted for evaluation of Right Epistaxis. Per RN, pt was sleeping,  started to bleed spontaneously from mostly from Right Nare, and spit out blood. Primary team applied pressure prior to evaluation. Per pt, episode of  epistaxis on 6/26 which was controlled after applying pressure in ED. Pt takes Plavix 75mg PO,  ASA 81mg PO QD and SC Heparin . Pt denies recent Nasal covid swabs, digital trauma, NG Tube placement, NC use. Also denies N/V, SOB, sinus pain, cough, sore throat, recent URI,  constipation, runny nose, dysphagia/Odynophagia,  recent travel/sick contacts.         PAST MEDICAL & SURGICAL HISTORY:  Diabetes      Hypertension, unspecified type      Stented coronary artery      Hyperlipidemia, unspecified hyperlipidemia type        Allergies    No Known Allergies    Intolerances      MEDICATIONS  (STANDING):  allopurinol 100 milliGRAM(s) Oral daily  aspirin enteric coated 81 milliGRAM(s) Oral daily  atorvastatin 40 milliGRAM(s) Oral at bedtime  buPROPion XL (24-Hour) . 150 milliGRAM(s) Oral daily  cephalexin 500 milliGRAM(s) Oral every 12 hours  clopidogrel Tablet 75 milliGRAM(s) Oral daily  dextrose 5%. 1000 milliLiter(s) (50 mL/Hr) IV Continuous <Continuous>  dextrose 5%. 1000 milliLiter(s) (100 mL/Hr) IV Continuous <Continuous>  dextrose 50% Injectable 25 Gram(s) IV Push once  dextrose 50% Injectable 12.5 Gram(s) IV Push once  dextrose 50% Injectable 25 Gram(s) IV Push once  escitalopram 10 milliGRAM(s) Oral daily  glucagon  Injectable 1 milliGRAM(s) IntraMuscular once  heparin   Injectable 5000 Unit(s) SubCutaneous every 12 hours  insulin glargine Injectable (LANTUS) 6 Unit(s) SubCutaneous at bedtime  insulin lispro (ADMELOG) corrective regimen sliding scale   SubCutaneous three times a day before meals  insulin lispro (ADMELOG) corrective regimen sliding scale   SubCutaneous at bedtime  insulin lispro Injectable (ADMELOG) 6 Unit(s) SubCutaneous three times a day before meals  lisinopril 20 milliGRAM(s) Oral daily  metoprolol succinate ER 50 milliGRAM(s) Oral daily  pantoprazole    Tablet 40 milliGRAM(s) Oral before breakfast    MEDICATIONS  (PRN):  dextrose Oral Gel 15 Gram(s) Oral once PRN Blood Glucose LESS THAN 70 milliGRAM(s)/deciliter      Social History: see consult    Family history: see consult    ROS:   ENT: all negative except as noted in HPI   Pulm: denies SOB, cough, hemoptysis  Neuro: denies numbness/tingling, loss of sensation  Endo: denies heat/cold intolerance, excessive sweating      Vital Signs Last 24 Hrs  T(C): 36.8 (30 Jun 2022 04:33), Max: 36.8 (30 Jun 2022 04:33)  T(F): 98.2 (30 Jun 2022 04:33), Max: 98.2 (30 Jun 2022 04:33)  HR: 75 (30 Jun 2022 04:33) (65 - 80)  BP: 165/79 (30 Jun 2022 04:33) (126/66 - 179/80)  BP(mean): --  RR: 18 (30 Jun 2022 04:33) (18 - 18)  SpO2: 95% (30 Jun 2022 04:33) (95% - 95%)                          9.4    6.81  )-----------( 146      ( 30 Jun 2022 06:39 )             30.2         PT/INR - ( 30 Jun 2022 01:03 )   PT: 11.3 sec;   INR: 0.97 ratio         PTT - ( 30 Jun 2022 01:03 )  PTT:30.2 sec    PHYSICAL EXAM:  Gen: NAD, On RA  Skin: No rashes, bruises, or lesions  Head: Normocephalic, Atraumatic  Face: no edema, erythema, or fluctuance. Parotid glands soft without mass  Eyes: no scleral injection  Nose: Right Nare: Bleeding lesion in anterior nasal septum cauterized. Packed with xeroform packing x1.             Left Nare: No active bleeding noted.   Mouth: No Stridor / Drooling / Trismus.  Mucosa moist, tongue/uvula midline, crusted blood in posterior oropharynx.   Neck: Flat, supple, no lymphadenopathy, trachea midline, no masses  Lymphatic: No lymphadenopathy  Resp: breathing easily, no stridor  CV: no peripheral edema/cyanosis  GI: nondistended   Peripheral vascular: no JVD or edema  Neuro: facial nerve intact, no facial droop

## 2022-06-30 NOTE — CONSULT NOTE ADULT - PROBLEM SELECTOR RECOMMENDATION 9
- Right Epistaxis: Anterior nasal septal bleeding lesion cauterized. Packed with Xero Foam packing X1 to hold cautery.   - Will remove packing on 7/2.   - gram-positive abx coverage for duration of packing placement, c/w Keflex PO BID, can d/c on packing removal.   - Strict blood pressure control.  - Nasal saline, 2 sprays to both nares 4 times a day  - Avoid nasal trauma; no nose rubbing, blowing or manipulating nasal packing.    - Sneeze with mouth open and pinching nares.  - Avoid bending with head blow the waist.    - No heavy lifting.  - Monitor H/H, PT/INR.   - Maintain active T&S.   - Stool Softeners prn.   - Humidified O2 prn.   - ENT will follow.   - Call with questions.    # [id number]  ENT PA. - Right Epistaxis: Anterior nasal septal bleeding lesion cauterized. Packed with Xero Foam packing X1 to hold cautery.   - Will remove packing on 7/2.   - gram-positive abx coverage for duration of packing placement, c/w Keflex PO BID, can d/c on packing removal.   - Strict blood pressure control.  - Nasal saline, 2 sprays to both nares 4 times a day  - Avoid nasal trauma; no nose rubbing, blowing or manipulating nasal packing.    - Sneeze with mouth open and pinching nares.  - Avoid bending with head blow the waist.    - No heavy lifting.  - Monitor H/H, PT/INR.   - Maintain active T&S.   - Stool Softeners prn.   - Humidified O2 prn.   - ENT will follow.   - Call with questions.    # [id number]  ENT PA - Right Epistaxis: Anterior nasal septal bleeding lesion cauterized. Packed with Xeroform packing X1 to hold cautery.   - Will remove packing on 7/2.   - gram-positive abx coverage for duration of packing placement, c/w Keflex PO BID, can d/c on packing removal.   - Strict blood pressure control.  - Nasal saline, 2 sprays to both nares 4 times a day  - Avoid nasal trauma; no nose rubbing, blowing or manipulating nasal packing.    - Sneeze with mouth open and pinching nares.  - Avoid bending with head blow the waist.    - No heavy lifting.  - Monitor H/H, PT/INR.   - Maintain active T&S.   - Stool Softeners prn.   - Humidified O2 prn.   - ENT will follow.   - Call with questions.    # [id number]  ENT PA

## 2022-06-30 NOTE — PROGRESS NOTE ADULT - ASSESSMENT
87 YO F with PMH of  DM, HTN, HLD, CAD present to ED 6/27/22 s/p syncopal episode. ENT was consulted for evaluation of Right Epistaxis. Per RN, pt was sleeping, started to bleed spontaneously from mostly from Right Nare, and spit out blood. Primary team applied pressure prior to evaluation. Pt takes Plavix 75mg PO,  ASA 81mg PO QD and SC Heparin . Right Epistaxis: Anterior nasal septal bleeding lesion cauterized. Packed with xeroform x1.

## 2022-06-30 NOTE — PROGRESS NOTE ADULT - SUBJECTIVE AND OBJECTIVE BOX
EPS    PROGRESS  NOTE   ________________________________________________    CHIEF COMPLAINT:Patient is a 88y old  Female who presents with a chief complaint of syncope/ ?  seizures. (30 Jun 2022 01:30)  no complain.  	  REVIEW OF SYSTEMS:  CONSTITUTIONAL: No fever, weight loss, or fatigue  EYES: No eye pain, visual disturbances, or discharge  ENT:  No difficulty hearing, tinnitus, vertigo; No sinus or throat pain  NECK: No pain or stiffness  RESPIRATORY: No cough, wheezing, chills or hemoptysis; No Shortness of Breath  CARDIOVASCULAR: No chest pain, palpitations, passing out, dizziness, or leg swelling  GASTROINTESTINAL: No abdominal or epigastric pain. No nausea, vomiting, or hematemesis; No diarrhea or constipation. No melena or hematochezia.  GENITOURINARY: No dysuria, frequency, hematuria, or incontinence  NEUROLOGICAL: No headaches, memory loss, loss of strength, numbness, or tremors  SKIN: No itching, burning, rashes, or lesions   LYMPH Nodes: No enlarged glands  ENDOCRINE: No heat or cold intolerance; No hair loss  MUSCULOSKELETAL: No joint pain or swelling; No muscle, back, or extremity pain  PSYCHIATRIC: No depression, anxiety, mood swings, or difficulty sleeping  HEME/LYMPH: No easy bruising, or bleeding gums  ALLERGY AND IMMUNOLOGIC: No hives or eczema	    [ ] All others negative	  [ ] Unable to obtain    PHYSICAL EXAM:  T(C): 36.8 (06-30-22 @ 04:33), Max: 36.8 (06-30-22 @ 04:33)  HR: 75 (06-30-22 @ 04:33) (65 - 80)  BP: 165/79 (06-30-22 @ 04:33) (126/66 - 179/80)  RR: 18 (06-30-22 @ 04:33) (18 - 18)  SpO2: 95% (06-30-22 @ 04:33) (95% - 95%)  Wt(kg): --  I&O's Summary    29 Jun 2022 07:01  -  30 Jun 2022 07:00  --------------------------------------------------------  IN: 1487.5 mL / OUT: 750 mL / NET: 737.5 mL        Appearance: Normal	  HEENT:   Normal oral mucosa, PERRL, EOMI	  Lymphatic: No lymphadenopathy  Cardiovascular: Normal S1 S2, No JVD, + murmurs, No edema  Respiratory: Lungs clear to auscultation	  Psychiatry: A & O x 3, Mood & affect appropriate  Gastrointestinal:  Soft, Non-tender, + BS	  Skin: No rashes, No ecchymoses, No cyanosis	  Neurologic: Non-focal  Extremities: Normal range of motion, No clubbing, cyanosis or edema  Vascular: Peripheral pulses palpable 2+ bilaterally    MEDICATIONS  (STANDING):  allopurinol 100 milliGRAM(s) Oral daily  aspirin enteric coated 81 milliGRAM(s) Oral daily  atorvastatin 40 milliGRAM(s) Oral at bedtime  buPROPion XL (24-Hour) . 150 milliGRAM(s) Oral daily  cephalexin 500 milliGRAM(s) Oral every 12 hours  clopidogrel Tablet 75 milliGRAM(s) Oral daily  dextrose 5%. 1000 milliLiter(s) (50 mL/Hr) IV Continuous <Continuous>  dextrose 5%. 1000 milliLiter(s) (100 mL/Hr) IV Continuous <Continuous>  dextrose 50% Injectable 25 Gram(s) IV Push once  dextrose 50% Injectable 12.5 Gram(s) IV Push once  dextrose 50% Injectable 25 Gram(s) IV Push once  escitalopram 10 milliGRAM(s) Oral daily  glucagon  Injectable 1 milliGRAM(s) IntraMuscular once  heparin   Injectable 5000 Unit(s) SubCutaneous every 12 hours  insulin glargine Injectable (LANTUS) 6 Unit(s) SubCutaneous at bedtime  insulin lispro (ADMELOG) corrective regimen sliding scale   SubCutaneous three times a day before meals  insulin lispro (ADMELOG) corrective regimen sliding scale   SubCutaneous at bedtime  insulin lispro Injectable (ADMELOG) 6 Unit(s) SubCutaneous three times a day before meals  lisinopril 15 milliGRAM(s) Oral daily  metoprolol succinate ER 50 milliGRAM(s) Oral daily  pantoprazole    Tablet 40 milliGRAM(s) Oral before breakfast      TELEMETRY: 	    ECG:  	  RADIOLOGY:  OTHER: 	  	  LABS:	 	    CARDIAC MARKERS:                                9.8    7.18  )-----------( 154      ( 30 Jun 2022 01:03 )             31.6     06-28    138  |  104  |  16  ----------------------------<  156<H>  4.3   |  23  |  0.68    Ca    8.5      28 Jun 2022 07:18      proBNP:   Lipid Profile:   HgA1c:   TSH: Thyroid Stimulating Hormone, Serum: 2.47 uIU/mL (06-28 @ 07:19)    PT/INR - ( 30 Jun 2022 01:03 )   PT: 11.3 sec;   INR: 0.97 ratio         PTT - ( 30 Jun 2022 01:03 )  PTT:30.2 sec    < from: Transthoracic Echocardiogram (06.28.22 @ 11:17) >  Mitral Valve: Mitral annular calcification. Mild mitral  regurgitation.  Peak mitral valve gradient equals 6 mm Hg,  mean transmitral valve gradient equals 2 mm Hg.  Aortic Valve/Aorta: Normal trileaflet aortic valve. Peak  transaortic valve gradient equals 7 mm Hg, mean transaortic  valve gradient equals 3 mm Hg, aortic valve velocity time  integral equals 27 cm. Minimal aortic regurgitation.  Peak  left ventricular outflow tract gradient equals 6mm Hg,  mean gradient is equal to 3 mm Hg, LVOT velocity time  integral equals 23 cm.  Aortic Root: 3.7 cm.  Left Atrium: Normal left atrium.  LA volume index = 33  cc/m2.  Left Ventricle: Hyperdynamic left ventricular systolic  function. Septal hypertrophy. Mild diastolic dysfunction  (Stage I).  Right Heart: Normal right atrium. Normal right ventricular  size and function. Normal tricuspid valve. Normal pulmonic  valve. Minimal pulmonic regurgitation.  Pericardium/Pleura: Normal pericardium with no pericardial  effusion.  Hemodynamic: Estimated right atrial pressure is 8 mm Hg.  Color Doppler demonstrates no evidence of a patent foramen  ovale.  ------------------------------------------------------------------------  Conclusions:  1. Septal hypertrophy.  2. Hyperdynamic left ventricular systolic function.  3. Mild diastolic dysfunction (Stage I).  4. Normal right ventricular size and function.      Assessment and plan  ---------------------------  88 year old RH female with a past medical history of HTN, HLD, DM, CAD with stents who is presenting as a transfer from [place] for evaluation of possible seizure episode.    At baseline, the patient is ambulatory with a cane when she goes outside, lives alone and performs her own ADLs. The patient had an episode of nose bleeding for which she first presented to Urgent Care and then went to [facility] ED where her bleeding spontaneously stopped and she was discharged home. She then went home and was sitting down and eating a pastry when she told her daughter who was present that the patient felt like she was going to faint. The patient stated she had symptoms of her vision darkening, a symptom of epigastric discomfort and nausea, sweating and then had lost consciousness. As per the daughter, the patient began to lean back, her eyes rolled back, and to avoid falling backwards and vomiting was pushed forward by the daughter. The episode lasted several seconds and then the patient regained consciousness. EMS was called.    The patient then seemed slightly disoriented  which lasted several minutes and had completely resolved by the time that EMS had arrived. It was noted that there was some liquid by the patient which was concerning for urinary incontinence however it unclear as the patient stated that her undergarments and dress were dry. The patient was then brought to the ED at [place] and was to undergo cardiac workup and was transferred to [facility] as there was no EEG monitoring. Currently, the patient states that she feels well and denied headaches, visual changes, auditory changes, numbness, tingling, weakness.  Patient notes that in the past she had a fall several months ago while she was ambulating and thinks that she may have lost consciousness during that time.  Patient stated that in the past when she was 10 years old, she was diagnosed with seizures. These episodes were marked by not being able to speak for a brief period of time and disorientation. Stated that she feels that she might remember these episodes but was unsure. She denied taking medications for these events and noted that when she underwent menarche, these episodes stopped and she has not had any since.  Is taking Wellbutrin and has been taking the same dose for 16 years as per daughter.   pt with sig cardiac hx s/p multiple stents ?new LBBB  tele nsr  echo  may need ischemia goldstein if not done recently  will adjust cardiac meds  severe orthostatic hypotension, keep sbp >140 at rest sitting  clive stocking  am cortisol lervel  echo noted with septal hypertrophy but no lvot gradient  orthostatisms is improved, iv hydration  no calcium channel blocker sec to orthostatic hypotension  ischemia goldstein as per card  will increase beta blocker and lisinopril

## 2022-06-30 NOTE — CONSULT NOTE ADULT - REASON FOR ADMISSION
syncope/ ?  seizures.
syncope/ ?  seizures

## 2022-06-30 NOTE — PROGRESS NOTE ADULT - ASSESSMENT
88 year old RH female      h/o HTN, HLD, DM, CAD with stents     who is presenting as a transfer from [place] for evaluation of possible seizure episode./  and  s/p  syncope   LOC episode with prodrome of darkening of vision, sweating, nausea with history of  cad, need  to  r/o  cardiac  causes   per  neuro,  no  seizures meds    tele.  r/o  arrythmia   echo/  card d r xuan   HYN/ HLD  on  asa.  lisinoril, norvasc   CAD/  stents, on asa./ plavix/  lipitor   DM,  follow  fs, on  janumet   depression, on bupropion,  lexapro  on  dvt  ppx   +  orthostatic,  on iv fluids    echo, normal  ef/ card  d r xuan   anemia,  guaiac  +/   ?  from her epistaxix/   hb stable    s/p epistaxix  again/ nasal packing  pe r ent/  eeg. no  seizures  noted          88 year old RH female      h/o HTN, HLD, DM, CAD with stents     who is presenting as a transfer from [place] for evaluation of possible seizure episode./  and  s/p  syncope   LOC episode with prodrome of darkening of vision, sweating, nausea with history of  cad, need  to  r/o  cardiac  causes   per  neuro,  no  seizures meds    tele.  r/o  arrythmia   echo/  card d r golyan   HYN/ HLD  on  asa.  lisinoril, norvasc   CAD/  stents, on asa./ plavix/  lipitor   DM,  follow  fs, on  janumet   depression, on bupropion,  lexapro  on  dvt  ppx   +  orthostatic,  on iv fluids    echo, normal  ef/ card   n jane   anemia,  guaiac  +/   ?  from her epistaxix/   hb stable    s/p epistaxix  again/ nasal packing  pe r ent/  eeg. no  seizures  noted

## 2022-06-30 NOTE — GOALS OF CARE CONVERSATION - ADVANCED CARE PLANNING - CONVERSATION DETAILS
Introduced self and role of the PCE.  Patient was seen for goals of care conversation and daughter Bradley at bedside.  No documents found on patient window search.  Family reports this is her first to this hospital and documents have not been submitted.  Pt has HCP POA and Living documents.  Daughter Bradley is HCP agent.  They have discussed measures in te past.  There are 3 other children sons present in pts care.      CPR/ intubation procedures and possible complications explained.  Pt wants to remain fill code.  Does not want to live in a vegetative sate, Caregiver center resources provided.    Contact information for further needs provided.  No Gnosticism exemptions noted. Code XQBK provided.        Colette Liu RN  Palliative Care Educator  [phone] cell

## 2022-06-30 NOTE — CONSULT NOTE ADULT - CONSULT REQUESTED DATE/TIME
30-Jun-2022 01:30
27-Jun-2022 01:50
27-Jun-2022 09:07
27-Jun-2022 16:37
29-Jun-2022 11:30
27-Jun-2022

## 2022-06-30 NOTE — CHART NOTE - NSCHARTNOTEFT_GEN_A_CORE
EEG reviewed and results as below:    EEG SUMMARY/CLASSIFICATION    Normal EEG in the awake, drowsy and asleep states.    _____________________________________________________________  EEG IMPRESSION/CLINICAL CORRELATE    Normal EEG study.  No epileptiform pattern or seizure seen.  ______________________________________________________      No evidence of epilepsy. Discontinue EEG. Remain off anti-seizure medications.  No further neurological workup.  Neurology signing off. Please reconsult PRN or call [facility] [phone] with any questions.

## 2022-06-30 NOTE — PROGRESS NOTE ADULT - ASSESSMENT
Assessment  DMT2: 88y Female with DM T2 with hyperglycemia, A1C 7.6%, was on oral meds/Janumet at home, now on low-dose basal bolus insulin, increased dose yesterday, blood sugars are improving though still not at target, no hypoglycemic episodes, eating meals, no acute events.  Syncope: AM cortisol WNL, workup in progress, monitored.  HTN: Controlled,  on antihypertensive medications.  HLD: On statin.      Jaspal Augustine MD  Cell: [phone]  Office: [phone]       Assessment  DMT2: 88y Female with DM T2 with hyperglycemia, A1C 7.6%, was on oral meds/Janumet at home, now on low-dose basal bolus insulin, increased dose yesterday, blood sugars are improving though still not at target, no  hypoglycemic episodes, eating meals, no acute events.  Syncope: AM cortisol WNL, workup in progress, monitored.  HTN: Controlled,  on antihypertensive medications.  HLD: On statin.      Jaspal Augustine MD  Cell: [phone]  Office: [phone]

## 2022-06-30 NOTE — PROGRESS NOTE ADULT - SUBJECTIVE AND OBJECTIVE BOX
[facility] -- Kasia Cook, Blas Arriaga, Mervin Villanueva Savella  Office # [phone]      Follow Up:  CAD    Subjective/Observations: Patient seen and examined. Events noted. Resting  in bed. No complaints of chest pain, dyspnea, or palpitations reported. No signs of orthopnea or PND. Had epistaxis now w nasal tamponade in place. on VEEG      REVIEW OF SYSTEMS: All other review of systems is negative unless indicated above    PAST MEDICAL & SURGICAL HISTORY:  Diabetes      Hypertension, unspecified type      Stented coronary artery      Hyperlipidemia, unspecified hyperlipidemia type          MEDICATIONS  (STANDING):  allopurinol 100 milliGRAM(s) Oral daily  aspirin enteric coated 81 milliGRAM(s) Oral daily  atorvastatin 40 milliGRAM(s) Oral at bedtime  buPROPion XL (24-Hour) . 150 milliGRAM(s) Oral daily  cephalexin 500 milliGRAM(s) Oral every 12 hours  clopidogrel Tablet 75 milliGRAM(s) Oral daily  dextrose 5%. 1000 milliLiter(s) (50 mL/Hr) IV Continuous <Continuous>  dextrose 5%. 1000 milliLiter(s) (100 mL/Hr) IV Continuous <Continuous>  dextrose 50% Injectable 25 Gram(s) IV Push once  dextrose 50% Injectable 12.5 Gram(s) IV Push once  dextrose 50% Injectable 25 Gram(s) IV Push once  escitalopram 10 milliGRAM(s) Oral daily  glucagon  Injectable 1 milliGRAM(s) IntraMuscular once  heparin   Injectable 5000 Unit(s) SubCutaneous every 12 hours  insulin glargine Injectable (LANTUS) 6 Unit(s) SubCutaneous at bedtime  insulin lispro (ADMELOG) corrective regimen sliding scale   SubCutaneous three times a day before meals  insulin lispro (ADMELOG) corrective regimen sliding scale   SubCutaneous at bedtime  insulin lispro Injectable (ADMELOG) 6 Unit(s) SubCutaneous three times a day before meals  lisinopril 20 milliGRAM(s) Oral daily  metoprolol succinate ER 50 milliGRAM(s) Oral daily  pantoprazole    Tablet 40 milliGRAM(s) Oral before breakfast    MEDICATIONS  (PRN):  dextrose Oral Gel 15 Gram(s) Oral once PRN Blood Glucose LESS THAN 70 milliGRAM(s)/deciliter      Allergies    No Known Allergies    Intolerances            Vital Signs Last 24 Hrs  T(C): 36.8 (30 Jun 2022 04:33), Max: 36.8 (30 Jun 2022 04:33)  T(F): 98.2 (30 Jun 2022 04:33), Max: 98.2 (30 Jun 2022 04:33)  HR: 75 (30 Jun 2022 04:33) (65 - 80)  BP: 165/79 (30 Jun 2022 04:33) (126/66 - 179/80)  BP(mean): --  RR: 18 (30 Jun 2022 04:33) (18 - 18)  SpO2: 95% (30 Jun 2022 04:33) (95% - 95%)    I&O's Summary    29 Jun 2022 07:01  -  30 Jun 2022 07:00  --------------------------------------------------------  IN: 1487.5 mL / OUT: 750 mL / NET: 737.5 mL          PHYSICAL EXAM:  TELE:    Constitutional: NAD, awake   HEENT: Moist Mucous Membranes, Anicteric right nasal rocket in place   Pulmonary: Decreased breath sounds b/l. No rales, crackles or wheeze appreciated.   Cardiovascular: Regular, S1 and S2, No murmurs, rubs, gallops or clicks  Gastrointestinal: Bowel Sounds present, soft, nontender.   Lymph: No peripheral edema. No lymphadenopathy.  Skin: No visible rashes or ulcers.  Psych:  Mood & affect appropriate for situation    LABS: All Labs Reviewed:                        9.4    6.81  )-----------( 146      ( 30 Jun 2022 06:39 )             30.2                         9.8    7.18  )-----------( 154      ( 30 Jun 2022 01:03 )             31.6                         9.5    6.98  )-----------( 146      ( 29 Jun 2022 01:20 )             30.0     28 Jun 2022 07:18    138    |  104    |  16     ----------------------------<  156    4.3     |  23     |  0.68     Ca    8.5        28 Jun 2022 07:18      PT/INR - ( 30 Jun 2022 01:03 )   PT: 11.3 sec;   INR: 0.97 ratio         PTT - ( 30 Jun 2022 01:03 )  PTT:30.2 sec

## 2022-06-30 NOTE — CONSULT NOTE ADULT - ASSESSMENT
89 YO F with PMH of  DM, HTN, HLD, CAD present to ED 6/27/22 s/p syncopal episode. ENT was consulted for evaluation of Right Epistaxis. Per RN, pt was sleeping,  started to bleed spontaneously from mostly from Right Nare, and spit out blood. Primary team applied pressure prior to evaluation. Pt takes Plavix 75mg PO,  ASA 81mg PO QD and SC Heparin . Right Epistaxis: Anterior nasal septal bleeding lesion cauterized. Packed with Xero Foam packing X1 to hold cautery. H/H: 9.8/31.6, Platelets: 154, PT/INR: 11.3/0.97. SBP> 170.        87 YO F with PMH of  DM, HTN, HLD, CAD present to ED 6/27/22 s/p syncopal episode. ENT was consulted for evaluation of Right Epistaxis. Per RN, pt was sleeping,  started to bleed spontaneously from mostly from Right Nare, and spit out blood. Primary team applied pressure prior to evaluation. Pt takes Plavix 75mg PO,  ASA 81mg PO QD and SC Heparin . Right Epistaxis: Anterior nasal septal bleeding lesion cauterized. Packed with Xeroform packing X1 to hold cautery. H/H: 9.8/31.6, Platelets: 154, PT/INR: 11.3/0.97. SBP> 170.

## 2022-06-30 NOTE — PROVIDER CONTACT NOTE (OTHER) - ACTION/TREATMENT ORDERED:
DIANE Godoy notified, assessed pt at bedside. ENT consult called ASAP. See provider note. DIANE Godoy notified, assessed pt at bedside. ENT consult called ASAP, right nare cauterized and packed. Stat CBC and Coags ordered. AM SubQ heparin held.

## 2022-06-30 NOTE — PROGRESS NOTE ADULT - SUBJECTIVE AND OBJECTIVE BOX
Chief complaint  Patient is a 88y old  Female who presents with a chief complaint of syncope/ ?  seizures (30 Jun 2022 11:32)   Review of systems  Patient in bed, looks comfortable, no hypoglycemic episodes.    Labs and Fingersticks  CAPILLARY BLOOD GLUCOSE      POCT Blood Glucose.: 195 mg/dL (30 Jun 2022 12:43)  POCT Blood Glucose.: 171 mg/dL (30 Jun 2022 09:04)  POCT Blood Glucose.: 138 mg/dL (29 Jun 2022 21:17)  POCT Blood Glucose.: 152 mg/dL (29 Jun 2022 17:16)                                            9.4    6.81  )-----------( 146      ( 30 Jun 2022 06:39 )             30.2     Medications  MEDICATIONS  (STANDING):  allopurinol 100 milliGRAM(s) Oral daily  aspirin enteric coated 81 milliGRAM(s) Oral daily  atorvastatin 40 milliGRAM(s) Oral at bedtime  buPROPion XL (24-Hour) . 150 milliGRAM(s) Oral daily  cephalexin 500 milliGRAM(s) Oral every 12 hours  dextrose 5%. 1000 milliLiter(s) (50 mL/Hr) IV Continuous <Continuous>  dextrose 5%. 1000 milliLiter(s) (100 mL/Hr) IV Continuous <Continuous>  dextrose 50% Injectable 25 Gram(s) IV Push once  dextrose 50% Injectable 12.5 Gram(s) IV Push once  dextrose 50% Injectable 25 Gram(s) IV Push once  escitalopram 10 milliGRAM(s) Oral daily  glucagon  Injectable 1 milliGRAM(s) IntraMuscular once  heparin   Injectable 5000 Unit(s) SubCutaneous every 12 hours  insulin glargine Injectable (LANTUS) 6 Unit(s) SubCutaneous at bedtime  insulin lispro (ADMELOG) corrective regimen sliding scale   SubCutaneous three times a day before meals  insulin lispro (ADMELOG) corrective regimen sliding scale   SubCutaneous at bedtime  insulin lispro Injectable (ADMELOG) 6 Unit(s) SubCutaneous three times a day before meals  metoprolol succinate ER 50 milliGRAM(s) Oral daily  pantoprazole    Tablet 40 milliGRAM(s) Oral before breakfast      Physical Exam  General: Patient comfortable in bed  Vital Signs Last 12 Hrs  T(F): 98.3 (06-30-22 @ 11:34), Max: 98.3 (06-30-22 @ 11:34)  HR: 65 (06-30-22 @ 11:34) (65 - 75)  BP: 156/62 (06-30-22 @ 11:34) (156/62 - 165/79)  BP(mean): --  RR: 18 (06-30-22 @ 11:34) (18 - 18)  SpO2: 95% (06-30-22 @ 11:34) (95% - 95%)  Neck: No palpable thyroid nodules.  CVS: S1S2, No murmurs  Respiratory: No wheezing, no crepitations  GI: Abdomen soft, bowel sounds positive  Musculoskeletal:  edema lower extremities.   Skin: No skin rashes, no ecchymosis    Diagnostics    A1C with Estimated Average Glucose: Routine (06-28 @ 13:18)  Free Thyroxine, Serum: AM Sched. Collection: 28-Jun-2022 06:00 (06-27 @ 14:03)  Thyroid Stimulating Hormone, Serum: AM Sched. Collection: 28-Jun-2022 06:00 (06-27 @ 14:03)  A1C with Estimated Average Glucose: Routine (06-27 @ 14:03)           Chief complaint  Patient is a 88y old  Female who presents with a chief complaint of syncope/ ?  seizures (30 Jun 2022 11:32)   Review of systems  Patient in bed, looks comfortable, no hypoglycemic episodes.    Labs and Fingersticks  CAPILLARY BLOOD GLUCOSE      POCT Blood Glucose.: 195 mg/dL (30 Jun 2022 12:43)  POCT Blood Glucose.: 171 mg/dL (30 Jun 2022 09:04)  POCT Blood Glucose.: 138 mg/dL (29 Jun 2022 21:17)  POCT Blood Glucose.: 152 mg/dL (29 Jun 2022 17:16)                                            9.4    6.81  )-----------( 146      ( 30 Jun 2022 06:39 )             30.2     Medications  MEDICATIONS  (STANDING):  allopurinol 100 milliGRAM(s) Oral daily  aspirin enteric coated 81 milliGRAM(s) Oral daily  atorvastatin 40 milliGRAM(s) Oral at bedtime  buPROPion XL (24-Hour) . 150 milliGRAM(s) Oral daily  cephalexin 500 milliGRAM(s) Oral every 12 hours  dextrose 5%. 1000 milliLiter(s) (50 mL/Hr) IV Continuous <Continuous>  dextrose 5%. 1000 milliLiter(s) (100 mL/Hr) IV Continuous <Continuous>  dextrose 50% Injectable 25 Gram(s) IV Push once  dextrose 50% Injectable 12.5 Gram(s) IV Push once  dextrose 50% Injectable 25 Gram(s) IV Push once  escitalopram 10 milliGRAM(s) Oral daily  glucagon  Injectable 1 milliGRAM(s) IntraMuscular once  heparin   Injectable 5000 Unit(s) SubCutaneous every 12 hours  insulin glargine Injectable (LANTUS) 6 Unit(s) SubCutaneous at bedtime  insulin lispro (ADMELOG) corrective regimen sliding scale   SubCutaneous three times a day before meals  insulin lispro (ADMELOG) corrective regimen sliding scale   SubCutaneous at bedtime  insulin lispro Injectable (ADMELOG) 6 Unit(s) SubCutaneous three times a day before meals  metoprolol succinate ER 50 milliGRAM(s) Oral daily  pantoprazole    Tablet 40 milliGRAM(s) Oral before breakfast      Physical Exam  General: Patient comfortable in bed  Vital Signs Last 12 Hrs  T(F): 98.3 (06-30-22 @ 11:34), Max: 98.3 (06-30-22 @ 11:34)  HR: 65 (06-30-22 @ 11:34) (65 - 75)  BP: 156/62 (06-30-22 @ 11:34) (156/62 - 165/79)  BP(mean): --  RR: 18 (06-30-22 @ 11:34) (18 - 18)  SpO2: 95% (06-30-22 @ 11:34) (95% - 95%)  Neck: No palpable thyroid nodules.  CVS: S1S2, No murmurs  Respiratory: No wheezing, no crepitations  GI: Abdomen soft, bowel sounds positive  Musculoskeletal:  edema lower extremities.   Skin: No skin rashes, no ecchymosis    Diagnostics    A1C with Estimated Average Glucose: Routine (06-28 @ 13:18)  Free Thyroxine, Serum: AM Sched. Collection: 28-Jun-2022 06:00 (06-27 @ 14:03)  Thyroid Stimulating Hormone, Serum: AM Sched. Collection: 28-Jun-2022 06:00 (06-27 @ 14:03)  A1C with Estimated Average Glucose: Routine (06-27 @ 14:03)

## 2022-06-30 NOTE — CONSULT NOTE ADULT - SUBJECTIVE AND OBJECTIVE BOX
CC: Right Epistaxis.     HPI:     PAST MEDICAL & SURGICAL HISTORY:  Diabetes      Hypertension, unspecified type      Stented coronary artery      Hyperlipidemia, unspecified hyperlipidemia type        Allergies    No Known Allergies    Intolerances      MEDICATIONS  (STANDING):  allopurinol 100 milliGRAM(s) Oral daily  aspirin enteric coated 81 milliGRAM(s) Oral daily  atorvastatin 40 milliGRAM(s) Oral at bedtime  buPROPion XL (24-Hour) . 150 milliGRAM(s) Oral daily  clopidogrel Tablet 75 milliGRAM(s) Oral daily  dextrose 5%. 1000 milliLiter(s) (50 mL/Hr) IV Continuous <Continuous>  dextrose 5%. 1000 milliLiter(s) (100 mL/Hr) IV Continuous <Continuous>  dextrose 50% Injectable 25 Gram(s) IV Push once  dextrose 50% Injectable 12.5 Gram(s) IV Push once  dextrose 50% Injectable 25 Gram(s) IV Push once  escitalopram 10 milliGRAM(s) Oral daily  glucagon  Injectable 1 milliGRAM(s) IntraMuscular once  heparin   Injectable 5000 Unit(s) SubCutaneous every 12 hours  insulin glargine Injectable (LANTUS) 6 Unit(s) SubCutaneous at bedtime  insulin lispro (ADMELOG) corrective regimen sliding scale   SubCutaneous three times a day before meals  insulin lispro (ADMELOG) corrective regimen sliding scale   SubCutaneous at bedtime  insulin lispro Injectable (ADMELOG) 6 Unit(s) SubCutaneous three times a day before meals  lisinopril 15 milliGRAM(s) Oral daily  metoprolol succinate ER 50 milliGRAM(s) Oral daily  pantoprazole    Tablet 40 milliGRAM(s) Oral before breakfast    MEDICATIONS  (PRN):  dextrose Oral Gel 15 Gram(s) Oral once PRN Blood Glucose LESS THAN 70 milliGRAM(s)/deciliter      Social History: **??**    Family history: **??**    ROS:   ENT: all negative except as noted in HPI   CV: denies palpitations  Pulm: denies SOB, cough, hemoptysis  GI: denies change in apetite, indigestion, n/v  : denies pertinent urinary symptoms, urgency  Neuro: denies numbness/tingling, loss of sensation  Psych: denies anxiety  MS: denies muscle weakness, instability  Heme: denies easy bruising or bleeding  Endo: denies heat/cold intolerance, excessive sweating  Vascular: denies LE edema    Vital Signs Last 24 Hrs  T(C): 36.7 (29 Jun 2022 20:45), Max: 36.7 (29 Jun 2022 20:45)  T(F): 98 (29 Jun 2022 20:45), Max: 98 (29 Jun 2022 20:45)  HR: 80 (30 Jun 2022 01:00) (65 - 80)  BP: 179/80 (30 Jun 2022 01:00) (126/66 - 179/80)  BP(mean): --  RR: 18 (29 Jun 2022 20:45) (18 - 18)  SpO2: 95% (29 Jun 2022 20:45) (95% - 95%)                          9.8    7.18  )-----------( 154      ( 30 Jun 2022 01:03 )             31.6    06-28    138  |  104  |  16  ----------------------------<  156<H>  4.3   |  23  |  0.68    Ca    8.5      28 Jun 2022 07:18     PT/INR - ( 30 Jun 2022 01:03 )   PT: 11.3 sec;   INR: 0.97 ratio         PTT - ( 30 Jun 2022 01:03 )  PTT:30.2 sec    PHYSICAL EXAM:  Gen: NAD  Skin: No rashes, bruises, or lesions  Head: Normocephalic, Atraumatic  Face: no edema, erythema, or fluctuance. Parotid glands soft without mass  Eyes: no scleral injection  Ears: Right - ear canal clear, TM intact without effusion or erythema. No evidence of any fluid drainage. No mastoid tenderness, erythema, or ear bulging            Left - ear canal clear, TM intact without effusion or erythema. No evidence of any fluid drainage. No mastoid tenderness, erythema, or ear bulging  Nose: Nares bilaterally patent, no discharge  Mouth: No Stridor / Drooling / Trismus.  Mucosa moist, tongue/uvula midline, oropharynx clear  Neck: Flat, supple, no lymphadenopathy, trachea midline, no masses  Lymphatic: No lymphadenopathy  Resp: breathing easily, no stridor  CV: no peripheral edema/cyanosis  GI: nondistended   Peripheral vascular: no JVD or edema  Neuro: facial nerve intact, no facial droop        Diagnostic Nasal Endoscopy: (Scope #2 used)    Fiberoptic Indirect laryngoscopy:  (Scope #2 used)        IMAGING/ADDITIONAL STUDIES:  CC: Right Epistaxis.     HPI:     PAST MEDICAL & SURGICAL HISTORY:  Diabetes  Hypertension, unspecified type  Stented coronary artery  Hyperlipidemia, unspecified hyperlipidemia type    Allergies  No Known Allergies    Intolerances    MEDICATIONS  (STANDING):  allopurinol 100 milliGRAM(s) Oral daily  aspirin enteric coated 81 milliGRAM(s) Oral daily  atorvastatin 40 milliGRAM(s) Oral at bedtime  buPROPion XL (24-Hour) . 150 milliGRAM(s) Oral daily  clopidogrel Tablet 75 milliGRAM(s) Oral daily  dextrose 5%. 1000 milliLiter(s) (50 mL/Hr) IV Continuous <Continuous>  dextrose 5%. 1000 milliLiter(s) (100 mL/Hr) IV Continuous <Continuous>  dextrose 50% Injectable 25 Gram(s) IV Push once  dextrose 50% Injectable 12.5 Gram(s) IV Push once  dextrose 50% Injectable 25 Gram(s) IV Push once  escitalopram 10 milliGRAM(s) Oral daily  glucagon  Injectable 1 milliGRAM(s) IntraMuscular once  heparin   Injectable 5000 Unit(s) SubCutaneous every 12 hours  insulin glargine Injectable (LANTUS) 6 Unit(s) SubCutaneous at bedtime  insulin lispro (ADMELOG) corrective regimen sliding scale   SubCutaneous three times a day before meals  insulin lispro (ADMELOG) corrective regimen sliding scale   SubCutaneous at bedtime  insulin lispro Injectable (ADMELOG) 6 Unit(s) SubCutaneous three times a day before meals  lisinopril 15 milliGRAM(s) Oral daily  metoprolol succinate ER 50 milliGRAM(s) Oral daily  pantoprazole    Tablet 40 milliGRAM(s) Oral before breakfast    MEDICATIONS  (PRN):  dextrose Oral Gel 15 Gram(s) Oral once PRN Blood Glucose LESS THAN 70 milliGRAM(s)/deciliter    Social History: Denies smoking/alcohol/drug use.   Family history: None.     ROS:   ENT: all negative except as noted in HPI   CV: denies palpitations  Pulm: denies SOB, cough, hemoptysis  GI: denies change in apetite, indigestion, n/v  : denies pertinent urinary symptoms, urgency  Neuro: denies numbness/tingling, loss of sensation  Psych: denies anxiety  MS: denies muscle weakness, instability  Heme: denies easy bruising or bleeding  Endo: denies heat/cold intolerance, excessive sweating  Vascular: denies LE edema    Vital Signs Last 24 Hrs  T(C): 36.7 (29 Jun 2022 20:45), Max: 36.7 (29 Jun 2022 20:45)  T(F): 98 (29 Jun 2022 20:45), Max: 98 (29 Jun 2022 20:45)  HR: 80 (30 Jun 2022 01:00) (65 - 80)  BP: 179/80 (30 Jun 2022 01:00) (126/66 - 179/80)  BP(mean): --  RR: 18 (29 Jun 2022 20:45) (18 - 18)  SpO2: 95% (29 Jun 2022 20:45) (95% - 95%)                        9.8    7.18  )-----------( 154      ( 30 Jun 2022 01:03 )             31.6    06-28    138  |  104  |  16  ----------------------------<  156<H>  4.3   |  23  |  0.68  Ca    8.5      28 Jun 2022 07:18   PT/INR - ( 30 Jun 2022 01:03 )   PT: 11.3 sec;   INR: 0.97 ratio     PTT - ( 30 Jun 2022 01:03 )  PTT:30.2 sec    PHYSICAL EXAM:  Gen: NAD, On RA.   Skin: No rashes, bruises, or lesions  Head: Normocephalic, Atraumatic  Face: no edema, erythema, or fluctuance. Parotid glands soft without mass  Eyes: no scleral injection  Nose: Right Nare: Bleeding lesion in anterior nasal septum cauterized. Packed with Xero Foam packing X1 to hold cautery.             Left Nare: No active bleeding noted.   Mouth: No Stridor / Drooling / Trismus.  Mucosa moist, tongue/uvula midline, crusted blood in posterior oropharynx.   Neck: Flat, supple, no lymphadenopathy, trachea midline, no masses  Lymphatic: No lymphadenopathy  Resp: breathing easily, no stridor  CV: no peripheral edema/cyanosis  GI: nondistended   Peripheral vascular: no JVD or edema  Neuro: facial nerve intact, no facial droop.  CC: Right Epistaxis.     HPI: 89 YO F with PMH of  DM, HTN, HLD, CAD present to ED 6/27/22 s/p syncopal episode. ENT was consulted for evaluation of Right Epistaxis. Per RN, pt was sleeping,  started to bleed spontaneously from mostly from Right Nare, and spit out blood. Primary team applied pressure prior to evaluation. Per pt, episode of  epistaxis on 6/26 which was controlled after applying pressure in ED.Pt takes Plavix 75mg PO,  ASA 81mg PO QD and SC Heparin . Pt denies recent Nasal covid swabs, digital trauma, NG Tube placement, NC use. Also denies N/V, SOB, sinus pain, cough, sore throat, recent URI,  constipation, runny nose, dysphagia/Odynophagia,  recent travel/sick contacts.     PAST MEDICAL & SURGICAL HISTORY:  Diabetes  Hypertension, unspecified type  Stented coronary artery  Hyperlipidemia, unspecified hyperlipidemia type    Allergies  No Known Allergies    Intolerances    MEDICATIONS  (STANDING):  allopurinol 100 milliGRAM(s) Oral daily  aspirin enteric coated 81 milliGRAM(s) Oral daily  atorvastatin 40 milliGRAM(s) Oral at bedtime  buPROPion XL (24-Hour) . 150 milliGRAM(s) Oral daily  clopidogrel Tablet 75 milliGRAM(s) Oral daily  dextrose 5%. 1000 milliLiter(s) (50 mL/Hr) IV Continuous <Continuous>  dextrose 5%. 1000 milliLiter(s) (100 mL/Hr) IV Continuous <Continuous>  dextrose 50% Injectable 25 Gram(s) IV Push once  dextrose 50% Injectable 12.5 Gram(s) IV Push once  dextrose 50% Injectable 25 Gram(s) IV Push once  escitalopram 10 milliGRAM(s) Oral daily  glucagon  Injectable 1 milliGRAM(s) IntraMuscular once  heparin   Injectable 5000 Unit(s) SubCutaneous every 12 hours  insulin glargine Injectable (LANTUS) 6 Unit(s) SubCutaneous at bedtime  insulin lispro (ADMELOG) corrective regimen sliding scale   SubCutaneous three times a day before meals  insulin lispro (ADMELOG) corrective regimen sliding scale   SubCutaneous at bedtime  insulin lispro Injectable (ADMELOG) 6 Unit(s) SubCutaneous three times a day before meals  lisinopril 15 milliGRAM(s) Oral daily  metoprolol succinate ER 50 milliGRAM(s) Oral daily  pantoprazole    Tablet 40 milliGRAM(s) Oral before breakfast    MEDICATIONS  (PRN):  dextrose Oral Gel 15 Gram(s) Oral once PRN Blood Glucose LESS THAN 70 milliGRAM(s)/deciliter    Social History: Denies smoking/alcohol/drug use.   Family history: None.     ROS:   ENT: all negative except as noted in HPI   CV: denies palpitations  Pulm: denies SOB, cough, hemoptysis  GI: denies change in apetite, indigestion, n/v  : denies pertinent urinary symptoms, urgency  Neuro: denies numbness/tingling, loss of sensation  Psych: denies anxiety  MS: denies muscle weakness, instability  Heme: denies easy bruising or bleeding  Endo: denies heat/cold intolerance, excessive sweating  Vascular: denies LE edema    Vital Signs Last 24 Hrs  T(C): 36.7 (29 Jun 2022 20:45), Max: 36.7 (29 Jun 2022 20:45)  T(F): 98 (29 Jun 2022 20:45), Max: 98 (29 Jun 2022 20:45)  HR: 80 (30 Jun 2022 01:00) (65 - 80)  BP: 179/80 (30 Jun 2022 01:00) (126/66 - 179/80)  BP(mean): --  RR: 18 (29 Jun 2022 20:45) (18 - 18)  SpO2: 95% (29 Jun 2022 20:45) (95% - 95%)                        9.8    7.18  )-----------( 154      ( 30 Jun 2022 01:03 )             31.6    06-28    138  |  104  |  16  ----------------------------<  156<H>  4.3   |  23  |  0.68  Ca    8.5      28 Jun 2022 07:18   PT/INR - ( 30 Jun 2022 01:03 )   PT: 11.3 sec;   INR: 0.97 ratio     PTT - ( 30 Jun 2022 01:03 )  PTT:30.2 sec    PHYSICAL EXAM:  Gen: NAD, On RA.   Skin: No rashes, bruises, or lesions  Head: Normocephalic, Atraumatic  Face: no edema, erythema, or fluctuance. Parotid glands soft without mass  Eyes: no scleral injection  Nose: Right Nare: Bleeding lesion in anterior nasal septum cauterized. Packed with Xero Foam packing X1 to hold cautery.             Left Nare: No active bleeding noted.   Mouth: No Stridor / Drooling / Trismus.  Mucosa moist, tongue/uvula midline, crusted blood in posterior oropharynx.   Neck: Flat, supple, no lymphadenopathy, trachea midline, no masses  Lymphatic: No lymphadenopathy  Resp: breathing easily, no stridor  CV: no peripheral edema/cyanosis  GI: nondistended   Peripheral vascular: no JVD or edema  Neuro: facial nerve intact, no facial droop.  CC: Right Epistaxis.     HPI: 87 YO F with PMH of  DM, HTN, HLD, CAD present to ED 6/27/22 s/p syncopal episode. ENT was consulted for evaluation of Right Epistaxis. Per RN, pt was sleeping,  started to bleed spontaneously from mostly from Right Nare, and spit out blood. Primary team applied pressure prior to evaluation. Per pt, episode of  epistaxis on 6/26 which was controlled after applying pressure in ED.Pt takes Plavix 75mg PO,  ASA 81mg PO QD and SC Heparin . Pt denies recent Nasal covid swabs, digital trauma, NG Tube placement, NC use. Also denies N/V, SOB, sinus pain, cough, sore throat, recent URI,  constipation, runny nose, dysphagia/Odynophagia,  recent travel/sick contacts.     PAST MEDICAL & SURGICAL HISTORY:  Diabetes  Hypertension, unspecified type  Stented coronary artery  Hyperlipidemia, unspecified hyperlipidemia type    Allergies  No Known Allergies    Intolerances    MEDICATIONS  (STANDING):  allopurinol 100 milliGRAM(s) Oral daily  aspirin enteric coated 81 milliGRAM(s) Oral daily  atorvastatin 40 milliGRAM(s) Oral at bedtime  buPROPion XL (24-Hour) . 150 milliGRAM(s) Oral daily  clopidogrel Tablet 75 milliGRAM(s) Oral daily  dextrose 5%. 1000 milliLiter(s) (50 mL/Hr) IV Continuous <Continuous>  dextrose 5%. 1000 milliLiter(s) (100 mL/Hr) IV Continuous <Continuous>  dextrose 50% Injectable 25 Gram(s) IV Push once  dextrose 50% Injectable 12.5 Gram(s) IV Push once  dextrose 50% Injectable 25 Gram(s) IV Push once  escitalopram 10 milliGRAM(s) Oral daily  glucagon  Injectable 1 milliGRAM(s) IntraMuscular once  heparin   Injectable 5000 Unit(s) SubCutaneous every 12 hours  insulin glargine Injectable (LANTUS) 6 Unit(s) SubCutaneous at bedtime  insulin lispro (ADMELOG) corrective regimen sliding scale   SubCutaneous three times a day before meals  insulin lispro (ADMELOG) corrective regimen sliding scale   SubCutaneous at bedtime  insulin lispro Injectable (ADMELOG) 6 Unit(s) SubCutaneous three times a day before meals  lisinopril 15 milliGRAM(s) Oral daily  metoprolol succinate ER 50 milliGRAM(s) Oral daily  pantoprazole    Tablet 40 milliGRAM(s) Oral before breakfast    MEDICATIONS  (PRN):  dextrose Oral Gel 15 Gram(s) Oral once PRN Blood Glucose LESS THAN 70 milliGRAM(s)/deciliter    Social History: Denies smoking/alcohol/drug use.   Family history: None.     ROS:   ENT: all negative except as noted in HPI   CV: denies palpitations  Pulm: denies SOB, cough, hemoptysis  GI: denies change in apetite, indigestion, n/v  : denies pertinent urinary symptoms, urgency  Neuro: denies numbness/tingling, loss of sensation  Psych: denies anxiety  MS: denies muscle weakness, instability  Heme: denies easy bruising or bleeding  Endo: denies heat/cold intolerance, excessive sweating  Vascular: denies LE edema    Vital Signs Last 24 Hrs  T(C): 36.7 (29 Jun 2022 20:45), Max: 36.7 (29 Jun 2022 20:45)  T(F): 98 (29 Jun 2022 20:45), Max: 98 (29 Jun 2022 20:45)  HR: 80 (30 Jun 2022 01:00) (65 - 80)  BP: 179/80 (30 Jun 2022 01:00) (126/66 - 179/80)  BP(mean): --  RR: 18 (29 Jun 2022 20:45) (18 - 18)  SpO2: 95% (29 Jun 2022 20:45) (95% - 95%)                        9.8    7.18  )-----------( 154      ( 30 Jun 2022 01:03 )             31.6    06-28    138  |  104  |  16  ----------------------------<  156<H>  4.3   |  23  |  0.68  Ca    8.5      28 Jun 2022 07:18   PT/INR - ( 30 Jun 2022 01:03 )   PT: 11.3 sec;   INR: 0.97 ratio     PTT - ( 30 Jun 2022 01:03 )  PTT:30.2 sec    PHYSICAL EXAM:  Gen: NAD, On RA.   Skin: No rashes, bruises, or lesions  Head: Normocephalic, Atraumatic  Face: no edema, erythema, or fluctuance. Parotid glands soft without mass  Eyes: no scleral injection  Nose: Right Nare: Bleeding lesion in anterior nasal septum cauterized. Packed with Xeroform packing X1 to hold cautery.             Left Nare: No active bleeding noted.   Mouth: No Stridor / Drooling / Trismus.  Mucosa moist, tongue/uvula midline, crusted blood in posterior oropharynx.   Neck: Flat, supple, no lymphadenopathy, trachea midline, no masses  Lymphatic: No lymphadenopathy  Resp: breathing easily, no stridor  CV: no peripheral edema/cyanosis  GI: nondistended   Peripheral vascular: no JVD or edema  Neuro: facial nerve intact, no facial droop.

## 2022-06-30 NOTE — EEG REPORT - NS EEG TEXT BOX
[facility]   COMPREHENSIVE EPILEPSY CENTER   REPORT OF LONG-TERM VIDEO EEG     [facility]: [address], [place] [address], Ph#: [phone]  [facility]: [address], [place] [address], Ph#: [phone]  [facility]: [address] [place] [address], Ph#: [phone]    Patient Name: ANIKA CHISHOLM  Age and : 88y (34)  MRN #: [id number]  Location: [address]  Referring Physician: Goldie Ho    Start Time/Date: 16:12 on 22  End Time/Date: 08:00 on 22  Duration: 15 h 48 min    _____________________________________________________________  STUDY INFORMATION    EEG Recording Technique:  The patient underwent continuous Video-EEG monitoring, using Telemetry System hardware on the XLTek Digital System. EEG and video data were stored on a computer hard drive with important events saved in digital archive files. The material was reviewed by a physician (electroencephalographer / epileptologist) on a daily basis. Carlos and seizure detection algorithms were utilized and reviewed. An EEG Technician attended to the patient, and was available throughout daytime work hours.  The epilepsy center neurologist was available in person or on call 24-hours per day.    EEG Placement and Labeling of Electrodes:  The EEG was performed utilizing 20 channel referential EEG connections (coronal over temporal over parasagittal montage) using all standard 10-20 electrode placements with EKG, with additional electrodes placed in the inferior temporal region using the modified 10-10 montage electrode placements for elective admissions, or if deemed necessary. Recording was at a sampling rate of 256 samples per second per channel. Time synchronized digital video recording was done simultaneously with EEG recording. A low light infrared camera was used for low light recording.     _____________________________________________________________  HISTORY    Patient is a 88y old  Female who presents with a chief complaint of syncope/ ?  seizures (2022 08:10)      PERTINENT MEDICATION:  MEDICATIONS  (STANDING):  allopurinol 100 milliGRAM(s) Oral daily  aspirin enteric coated 81 milliGRAM(s) Oral daily  atorvastatin 40 milliGRAM(s) Oral at bedtime  buPROPion XL (24-Hour) . 150 milliGRAM(s) Oral daily  cephalexin 500 milliGRAM(s) Oral every 12 hours  clopidogrel Tablet 75 milliGRAM(s) Oral daily  dextrose 5%. 1000 milliLiter(s) (50 mL/Hr) IV Continuous <Continuous>  dextrose 5%. 1000 milliLiter(s) (100 mL/Hr) IV Continuous <Continuous>  dextrose 50% Injectable 25 Gram(s) IV Push once  dextrose 50% Injectable 12.5 Gram(s) IV Push once  dextrose 50% Injectable 25 Gram(s) IV Push once  escitalopram 10 milliGRAM(s) Oral daily  glucagon  Injectable 1 milliGRAM(s) IntraMuscular once  heparin   Injectable 5000 Unit(s) SubCutaneous every 12 hours  insulin glargine Injectable (LANTUS) 6 Unit(s) SubCutaneous at bedtime  insulin lispro (ADMELOG) corrective regimen sliding scale   SubCutaneous three times a day before meals  insulin lispro (ADMELOG) corrective regimen sliding scale   SubCutaneous at bedtime  insulin lispro Injectable (ADMELOG) 6 Unit(s) SubCutaneous three times a day before meals  lisinopril 20 milliGRAM(s) Oral daily  metoprolol succinate ER 50 milliGRAM(s) Oral daily  pantoprazole    Tablet 40 milliGRAM(s) Oral before breakfast    _____________________________________________________________  STUDY INTERPRETATION    Findings: The background was continuous, spontaneously variable and reactive. During wakefulness, the posterior dominant rhythm consisted of symmetric, well-modulated 8-9 Hz activity, with amplitude to 30 uV, that attenuated to eye opening.      Background Slowing:  No generalized background slowing was present.    Focal Slowing:   None were present.    Sleep Background:  Drowsiness was characterized by fragmentation, attenuation, and slowing of the background activity.    Sleep was characterized by the presence of vertex waves, symmetric sleep spindles and K-complexes.    Other Non-Epileptiform Findings:  None were present.    Interictal Epileptiform Activity:   None were present.    Events:  Clinical events: None recorded.  Seizures: None recorded.    Activation Procedures:   Hyperventilation was not performed.    Photic stimulation was not performed.     Artifacts:  Intermittent myogenic and movement artifacts were noted.    ECG:  The heart rate on single channel ECG was predominantly between 60-70 BPM.    _____________________________________________________________  EEG SUMMARY/CLASSIFICATION    Normal EEG in the awake, drowsy and asleep states.    _____________________________________________________________  EEG IMPRESSION/CLINICAL CORRELATE    Normal EEG study.  No epileptiform pattern or seizure seen.  _____________________________________________________________    Arleen Javier DO  Attending Physician, [facility] [place]

## 2022-06-30 NOTE — PROGRESS NOTE ADULT - ASSESSMENT
89 yo female with PMHX DM, HTN, HLD, CAD present to ED 6/27/22 s/p syncopal episode. Noted to have significant epistaxis 6/26 (bleeding from nose, spit blood through mouth and swallowed blood per pt) seen in ED 6/26  now noted to have dark BM FOBT + 6/28/22 PM  BMs prior to admission were brown  Remote history of PUD ([facility] 2 years ago)    #FOBT + anemia - suspect 2/2 passage of swallowed blood from GI tract; prior to epistaxis (while on DAPT) stools have been brown  Hgb/HD stable though slight drop from admission  Pt very adamant about not wanting any repeat endoscopies  [facility] records obtained/reviewed : EGD 10/16/2020  Grade A reflux esophagitis, 3 linear non bleeding esophageal ulcers, 2 non bleeding gastric ulcers, normal duodenum    -PO PPI  -monitor BMs; expect stools to become progressively lighter/more brown over next 1-3 stools  -monitor CBC closely  -ok for PO diet as tolerated    #recurrent epistaxis  -management per ENT    Discussed with pt, all questions answered  Discussed with Medicine team and EP Cardiology attending    Francis Lorenzo PA-C    [facility]  [phone]  After hours and weekend coverage [phone]

## 2022-06-30 NOTE — PROGRESS NOTE ADULT - SUBJECTIVE AND OBJECTIVE BOX
Pt seen in AM rounds    INTERVAL HPI/OVERNIGHT EVENTS:  recurrent epistaxis overnight - required ENT cautery of bleeding lesion and packing placement right nare    no abdominal pain, nausea or vomiting  no CP   EEG in progress this AM  no further BM yesterday PM or overnight  plavix now held in setting of bleeding    MEDICATIONS  (STANDING):  allopurinol 100 milliGRAM(s) Oral daily  aspirin enteric coated 81 milliGRAM(s) Oral daily  atorvastatin 40 milliGRAM(s) Oral at bedtime  buPROPion XL (24-Hour) . 150 milliGRAM(s) Oral daily  cephalexin 500 milliGRAM(s) Oral every 12 hours  dextrose 5%. 1000 milliLiter(s) (50 mL/Hr) IV Continuous <Continuous>  dextrose 5%. 1000 milliLiter(s) (100 mL/Hr) IV Continuous <Continuous>  dextrose 50% Injectable 25 Gram(s) IV Push once  dextrose 50% Injectable 12.5 Gram(s) IV Push once  dextrose 50% Injectable 25 Gram(s) IV Push once  escitalopram 10 milliGRAM(s) Oral daily  glucagon  Injectable 1 milliGRAM(s) IntraMuscular once  heparin   Injectable 5000 Unit(s) SubCutaneous every 12 hours  insulin glargine Injectable (LANTUS) 8 Unit(s) SubCutaneous at bedtime  insulin lispro (ADMELOG) corrective regimen sliding scale   SubCutaneous three times a day before meals  insulin lispro (ADMELOG) corrective regimen sliding scale   SubCutaneous at bedtime  insulin lispro Injectable (ADMELOG) 6 Unit(s) SubCutaneous three times a day before meals  metoprolol succinate ER 50 milliGRAM(s) Oral daily  pantoprazole    Tablet 40 milliGRAM(s) Oral before breakfast    MEDICATIONS  (PRN):  dextrose Oral Gel 15 Gram(s) Oral once PRN Blood Glucose LESS THAN 70 milliGRAM(s)/deciliter      Allergies  No Known Allergies      Review of Systems:  General:  No wt loss, fevers, chills, night sweats, fatigue  CV:  No pain, +CAD s/p stents +LBBB +HTN ; see HPI  Resp:  No dyspnea, cough, tachypnea, wheezing  GI:  see HPI  :  No pain, bleeding, incontinence, nocturia  Muscle:  No pain, weakness  Neuro:  No weakness, tingling, memory problems  Psych:  No fatigue, insomnia, mood problems, depression  Endocrine:  No polyuria, polydypsia, cold/heat intolerance  Heme:  No petechiae, ecchymosis, easy bruisability  +epistaxis (see hPI)  Skin:  No rash, tattoos, scars, edema      Vital Signs Last 24 Hrs  T(C): 36.8 (30 Jun 2022 11:34), Max: 36.8 (30 Jun 2022 04:33)  T(F): 98.3 (30 Jun 2022 11:34), Max: 98.3 (30 Jun 2022 11:34)  HR: 65 (30 Jun 2022 11:34) (65 - 80)  BP: 156/62 (30 Jun 2022 11:34) (146/68 - 179/80)  BP(mean): --  RR: 18 (30 Jun 2022 11:34) (18 - 18)  SpO2: 95% (30 Jun 2022 11:34) (95% - 95%)    PHYSICAL EXAM:  Constitutional: NAD, well-developed elderly female  laying in bed Right nare packing VEEG in progress  Neck: No LAD, supple no JVD  Respiratory: Clear b/l no accessory muscle use  Cardiovascular: S1 and S2, RRR  Gastrointestinal: BS+, soft, NT/ND, neg HSM  Extremities: No peripheral edema, neg clubbing, cyanosis  Vascular: 2+ peripheral pulses  Neurological: A/O x 3, no focal deficits  Psychiatric: Normal mood, normal affect  Skin: No rashes, anicteric          LABS:                        9.4    6.81  )-----------( 146      ( 30 Jun 2022 06:39 )             30.2     Hemoglobin: 9.8 g/dL (06.30.22 @ 01:03)   Hemoglobin: 9.5 g/dL (06.29.22 @ 01:20)   Hemoglobin: 10.1 g/dL (06.28.22 @ 07:17)   Hemoglobin: 10.3 g/dL (06.27.22 @ 02:27)     PT/INR - ( 30 Jun 2022 01:03 )   PT: 11.3 sec;   INR: 0.97 ratio      PTT - ( 30 Jun 2022 01:03 )  PTT:30.2 sec        RADIOLOGY & ADDITIONAL TESTS:

## 2022-06-30 NOTE — PROGRESS NOTE ADULT - SUBJECTIVE AND OBJECTIVE BOX
eeg   lead s on/ nasal packing    REVIEW OF SYSTEMS:  GEN: no fever,    no chills  RESP: no SOB,   no cough  CVS: no chest pain,   no palpitations  GI: no abdominal pain,   no nausea,   no vomiting,   no constipation,   no diarrhea  : no dysuria,   no frequency  NEURO: no headache,   no dizziness  PSYCH: no depression,   not anxious  Derm : no rash    MEDICATIONS  (STANDING):  allopurinol 100 milliGRAM(s) Oral daily  aspirin enteric coated 81 milliGRAM(s) Oral daily  atorvastatin 40 milliGRAM(s) Oral at bedtime  buPROPion XL (24-Hour) . 150 milliGRAM(s) Oral daily  cephalexin 500 milliGRAM(s) Oral every 12 hours  clopidogrel Tablet 75 milliGRAM(s) Oral daily  dextrose 5%. 1000 milliLiter(s) (50 mL/Hr) IV Continuous <Continuous>  dextrose 5%. 1000 milliLiter(s) (100 mL/Hr) IV Continuous <Continuous>  dextrose 50% Injectable 25 Gram(s) IV Push once  dextrose 50% Injectable 12.5 Gram(s) IV Push once  dextrose 50% Injectable 25 Gram(s) IV Push once  escitalopram 10 milliGRAM(s) Oral daily  glucagon  Injectable 1 milliGRAM(s) IntraMuscular once  heparin   Injectable 5000 Unit(s) SubCutaneous every 12 hours  insulin glargine Injectable (LANTUS) 6 Unit(s) SubCutaneous at bedtime  insulin lispro (ADMELOG) corrective regimen sliding scale   SubCutaneous three times a day before meals  insulin lispro (ADMELOG) corrective regimen sliding scale   SubCutaneous at bedtime  insulin lispro Injectable (ADMELOG) 6 Unit(s) SubCutaneous three times a day before meals  lisinopril 20 milliGRAM(s) Oral daily  metoprolol succinate ER 50 milliGRAM(s) Oral daily  pantoprazole    Tablet 40 milliGRAM(s) Oral before breakfast    MEDICATIONS  (PRN):  dextrose Oral Gel 15 Gram(s) Oral once PRN Blood Glucose LESS THAN 70 milliGRAM(s)/deciliter      Vital Signs Last 24 Hrs  T(C): 36.8 (30 Jun 2022 04:33), Max: 36.8 (30 Jun 2022 04:33)  T(F): 98.2 (30 Jun 2022 04:33), Max: 98.2 (30 Jun 2022 04:33)  HR: 75 (30 Jun 2022 04:33) (68 - 80)  BP: 165/79 (30 Jun 2022 04:33) (146/68 - 179/80)  BP(mean): --  RR: 18 (30 Jun 2022 04:33) (18 - 18)  SpO2: 95% (30 Jun 2022 04:33) (95% - 95%)  CAPILLARY BLOOD GLUCOSE      POCT Blood Glucose.: 171 mg/dL (30 Jun 2022 09:04)  POCT Blood Glucose.: 138 mg/dL (29 Jun 2022 21:17)  POCT Blood Glucose.: 152 mg/dL (29 Jun 2022 17:16)  POCT Blood Glucose.: 200 mg/dL (29 Jun 2022 12:44)    I&O's Summary    29 Jun 2022 07:01  -  30 Jun 2022 07:00  --------------------------------------------------------  IN: 1487.5 mL / OUT: 750 mL / NET: 737.5 mL    30 Jun 2022 07:01  -  30 Jun 2022 11:32  --------------------------------------------------------  IN: 360 mL / OUT: 400 mL / NET: -40 mL        PHYSICAL EXAM:  HEAD:  Atraumatic, Normocephalic  NECK: Supple, No   JVD  CHEST/LUNG:   no     rales,     no,    rhonchi  HEART: Regular rate and rhythm;         murmur  ABDOMEN: Soft, Nontender, ;   EXTREMITIES:    no    edema  NEUROLOGY:  alert    LABS:                        9.4    6.81  )-----------( 146      ( 30 Jun 2022 06:39 )             30.2           PT/INR - ( 30 Jun 2022 01:03 )   PT: 11.3 sec;   INR: 0.97 ratio         PTT - ( 30 Jun 2022 01:03 )  PTT:30.2 sec                Thyroid Stimulating Hormone, Serum: 2.47 uIU/mL (06-28 @ 07:19)          Consultant(s) Notes Reviewed:      Care Discussed with Consultants/Other Providers:

## 2022-06-30 NOTE — PROVIDER CONTACT NOTE (OTHER) - ACTION/TREATMENT ORDERED:
okay not to give sliding scale, reassess post apple juice and dinner, okay to administer 6 units post FS reassessment and after meal

## 2022-07-01 LAB
GLUCOSE BLDC GLUCOMTR-MCNC: 107 MG/DL — HIGH (ref 70–99)
GLUCOSE BLDC GLUCOMTR-MCNC: 223 MG/DL — HIGH (ref 70–99)
GLUCOSE BLDC GLUCOMTR-MCNC: 225 MG/DL — HIGH (ref 70–99)
GLUCOSE BLDC GLUCOMTR-MCNC: 267 MG/DL — HIGH (ref 70–99)
HCT VFR BLD CALC: 30.6 % — LOW (ref 34.5–45)
HGB BLD-MCNC: 9.7 G/DL — LOW (ref 11.5–15.5)
MCHC RBC-ENTMCNC: 29 PG — SIGNIFICANT CHANGE UP (ref 27–34)
MCHC RBC-ENTMCNC: 31.7 GM/DL — LOW (ref 32–36)
MCV RBC AUTO: 91.3 FL — SIGNIFICANT CHANGE UP (ref 80–100)
NRBC # BLD: 0 /100 WBCS — SIGNIFICANT CHANGE UP (ref 0–0)
PLATELET # BLD AUTO: 166 K/UL — SIGNIFICANT CHANGE UP (ref 150–400)
RBC # BLD: 3.35 M/UL — LOW (ref 3.8–5.2)
RBC # FLD: 13.2 % — SIGNIFICANT CHANGE UP (ref 10.3–14.5)
WBC # BLD: 6.52 K/UL — SIGNIFICANT CHANGE UP (ref 3.8–10.5)
WBC # FLD AUTO: 6.52 K/UL — SIGNIFICANT CHANGE UP (ref 3.8–10.5)

## 2022-07-01 PROCEDURE — 99231 SBSQ HOSP IP/OBS SF/LOW 25: CPT | Mod: FS

## 2022-07-01 PROCEDURE — 99232 SBSQ HOSP IP/OBS MODERATE 35: CPT

## 2022-07-01 RX ORDER — METOPROLOL TARTRATE 50 MG
25 TABLET ORAL ONCE
Refills: 0 | Status: COMPLETED | OUTPATIENT
Start: 2022-07-01 | End: 2022-07-01

## 2022-07-01 RX ORDER — SODIUM CHLORIDE 0.65 %
1 AEROSOL, SPRAY (ML) NASAL
Refills: 0 | Status: DISCONTINUED | OUTPATIENT
Start: 2022-07-01 | End: 2022-07-04

## 2022-07-01 RX ORDER — METOPROLOL TARTRATE 50 MG
75 TABLET ORAL DAILY
Refills: 0 | Status: DISCONTINUED | OUTPATIENT
Start: 2022-07-01 | End: 2022-07-02

## 2022-07-01 RX ADMIN — PANTOPRAZOLE SODIUM 40 MILLIGRAM(S): 20 TABLET, DELAYED RELEASE ORAL at 06:38

## 2022-07-01 RX ADMIN — Medication 6 UNIT(S): at 09:57

## 2022-07-01 RX ADMIN — Medication 100 MILLIGRAM(S): at 09:58

## 2022-07-01 RX ADMIN — HEPARIN SODIUM 5000 UNIT(S): 5000 INJECTION INTRAVENOUS; SUBCUTANEOUS at 06:38

## 2022-07-01 RX ADMIN — ATORVASTATIN CALCIUM 40 MILLIGRAM(S): 80 TABLET, FILM COATED ORAL at 21:28

## 2022-07-01 RX ADMIN — Medication 25 MILLIGRAM(S): at 09:57

## 2022-07-01 RX ADMIN — HEPARIN SODIUM 5000 UNIT(S): 5000 INJECTION INTRAVENOUS; SUBCUTANEOUS at 17:15

## 2022-07-01 RX ADMIN — Medication 6 UNIT(S): at 17:45

## 2022-07-01 RX ADMIN — ESCITALOPRAM OXALATE 10 MILLIGRAM(S): 10 TABLET, FILM COATED ORAL at 10:00

## 2022-07-01 RX ADMIN — Medication 1 SPRAY(S): at 17:46

## 2022-07-01 RX ADMIN — BUPROPION HYDROCHLORIDE 150 MILLIGRAM(S): 150 TABLET, EXTENDED RELEASE ORAL at 09:58

## 2022-07-01 RX ADMIN — INSULIN GLARGINE 8 UNIT(S): 100 INJECTION, SOLUTION SUBCUTANEOUS at 21:28

## 2022-07-01 RX ADMIN — Medication 500 MILLIGRAM(S): at 06:38

## 2022-07-01 RX ADMIN — Medication 1 SPRAY(S): at 23:30

## 2022-07-01 RX ADMIN — Medication 81 MILLIGRAM(S): at 09:58

## 2022-07-01 RX ADMIN — Medication 2: at 13:39

## 2022-07-01 RX ADMIN — Medication 3: at 09:57

## 2022-07-01 RX ADMIN — LISINOPRIL 20 MILLIGRAM(S): 2.5 TABLET ORAL at 06:38

## 2022-07-01 RX ADMIN — Medication 500 MILLIGRAM(S): at 17:15

## 2022-07-01 RX ADMIN — Medication 50 MILLIGRAM(S): at 06:38

## 2022-07-01 RX ADMIN — Medication 6 UNIT(S): at 13:38

## 2022-07-01 NOTE — PROGRESS NOTE ADULT - ASSESSMENT
87 yo female with PMHX DM, HTN, HLD, CAD present to ED 6/27/22 s/p syncopal episode. Noted to have significant epistaxis 6/26 (bleeding from nose, spit blood through mouth and swallowed blood per pt) seen in ED 6/26  now noted to have dark BM FOBT + 6/28/22 PM  BMs prior to admission were brown  Remote history of PUD ([facility] 2 years ago)    #FOBT + anemia - suspect 2/2 passage of swallowed blood from GI tract; prior to epistaxis (while on DAPT) stools have been brown  Hgb/HD remains stable though slight drop from admission  Pt very adamant about not wanting any repeat endoscopies  [facility] records obtained/reviewed : EGD 10/16/2020  Grade A reflux esophagitis, 3 linear non bleeding esophageal ulcers, 2 non bleeding gastric ulcers, normal duodenum    -PO PPI  -monitor BMs; expect stools to become progressively lighter/more brown over next 1-3 stools  -monitor CBC  -PO diet as tolerated    #recurrent epistaxis  -management per ENT    Discussed with pt, all questions answered  Discussed with Medicine team and EP Cardiology attending    No GI objection to dc planning as per primary team/ENT  Please call over holiday weekend 7/2-7/4 prn with GI concerns   GI service : [phone]      Francis Lorenzo PA-C    [facility]  [phone]  After hours and weekend coverage [phone]

## 2022-07-01 NOTE — PROGRESS NOTE ADULT - SUBJECTIVE AND OBJECTIVE BOX
[facility] -- Kasia Cook, Bart, Blas, Mervin Villanueva Savella  Office # [phone]      Follow Up:  syncope    Subjective/Observations: Patient seen and examined. Events noted. Resting comfortably in bed. No complaints of chest pain, dyspnea, or palpitations reported. No signs of orthopnea or PND.       REVIEW OF SYSTEMS: All other review of systems is negative unless indicated above    PAST MEDICAL & SURGICAL HISTORY:  Diabetes      Hypertension, unspecified type      Stented coronary artery      Hyperlipidemia, unspecified hyperlipidemia type          MEDICATIONS  (STANDING):  allopurinol 100 milliGRAM(s) Oral daily  aspirin enteric coated 81 milliGRAM(s) Oral daily  atorvastatin 40 milliGRAM(s) Oral at bedtime  buPROPion XL (24-Hour) . 150 milliGRAM(s) Oral daily  cephalexin 500 milliGRAM(s) Oral every 12 hours  dextrose 5%. 1000 milliLiter(s) (50 mL/Hr) IV Continuous <Continuous>  dextrose 5%. 1000 milliLiter(s) (100 mL/Hr) IV Continuous <Continuous>  dextrose 50% Injectable 25 Gram(s) IV Push once  dextrose 50% Injectable 25 Gram(s) IV Push once  dextrose 50% Injectable 12.5 Gram(s) IV Push once  escitalopram 10 milliGRAM(s) Oral daily  glucagon  Injectable 1 milliGRAM(s) IntraMuscular once  heparin   Injectable 5000 Unit(s) SubCutaneous every 12 hours  insulin glargine Injectable (LANTUS) 8 Unit(s) SubCutaneous at bedtime  insulin lispro (ADMELOG) corrective regimen sliding scale   SubCutaneous three times a day before meals  insulin lispro (ADMELOG) corrective regimen sliding scale   SubCutaneous at bedtime  insulin lispro Injectable (ADMELOG) 6 Unit(s) SubCutaneous three times a day before meals  lisinopril 20 milliGRAM(s) Oral daily  metoprolol succinate ER 75 milliGRAM(s) Oral daily  pantoprazole    Tablet 40 milliGRAM(s) Oral before breakfast    MEDICATIONS  (PRN):  dextrose Oral Gel 15 Gram(s) Oral once PRN Blood Glucose LESS THAN 70 milliGRAM(s)/deciliter      Allergies    No Known Allergies    Intolerances            Vital Signs Last 24 Hrs  T(C): 36.7 (01 Jul 2022 04:30), Max: 36.8 (30 Jun 2022 11:34)  T(F): 98 (01 Jul 2022 04:30), Max: 98.3 (30 Jun 2022 11:34)  HR: 66 (01 Jul 2022 04:30) (65 - 72)  BP: 162/62 (01 Jul 2022 04:30) (127/72 - 162/62)  BP(mean): --  RR: 18 (01 Jul 2022 04:30) (18 - 18)  SpO2: 94% (01 Jul 2022 04:30) (94% - 95%)    I&O's Summary    30 Jun 2022 07:01  -  01 Jul 2022 07:00  --------------------------------------------------------  IN: 840 mL / OUT: 400 mL / NET: 440 mL          PHYSICAL EXAM:  TELE:    Constitutional: NAD, awake and alert, well-developed  HEENT: Moist Mucous Membranes, Anicteric nasal tamponade   Pulmonary: Non-labored, breath sounds are clear bilaterally, No wheezing, rales or rhonchi  Cardiovascular: Regular, S1 and S2, No murmurs, rubs, gallops or clicks  Gastrointestinal: Bowel Sounds present, soft, nontender.   Lymph: No peripheral edema. No lymphadenopathy.  Skin: No visible rashes or ulcers.  Psych:  Mood & affect appropriate for situation    LABS: All Labs Reviewed:                        9.4    6.81  )-----------( 146      ( 30 Jun 2022 06:39 )             30.2                         9.8    7.18  )-----------( 154      ( 30 Jun 2022 01:03 )             31.6                         9.5    6.98  )-----------( 146      ( 29 Jun 2022 01:20 )             30.0           PT/INR - ( 30 Jun 2022 01:03 )   PT: 11.3 sec;   INR: 0.97 ratio         PTT - ( 30 Jun 2022 01:03 )  PTT:30.2 sec

## 2022-07-01 NOTE — PROGRESS NOTE ADULT - SUBJECTIVE AND OBJECTIVE BOX
Chief complaint  Patient is a 88y old  Female who presents with a chief complaint of syncope/ ?  seizures (01 Jul 2022 09:29)   Review of systems  Patient in bed, looks comfortable, no hypoglycemic episodes.    Labs and Fingersticks  CAPILLARY BLOOD GLUCOSE      POCT Blood Glucose.: 223 mg/dL (01 Jul 2022 12:51)  POCT Blood Glucose.: 267 mg/dL (01 Jul 2022 09:55)  POCT Blood Glucose.: 210 mg/dL (30 Jun 2022 22:11)  POCT Blood Glucose.: 196 mg/dL (30 Jun 2022 18:54)  POCT Blood Glucose.: 70 mg/dL (30 Jun 2022 17:22)                                            9.7    6.52  )-----------( 166      ( 01 Jul 2022 07:19 )             30.6     Medications  MEDICATIONS  (STANDING):  allopurinol 100 milliGRAM(s) Oral daily  aspirin enteric coated 81 milliGRAM(s) Oral daily  atorvastatin 40 milliGRAM(s) Oral at bedtime  buPROPion XL (24-Hour) . 150 milliGRAM(s) Oral daily  cephalexin 500 milliGRAM(s) Oral every 12 hours  dextrose 5%. 1000 milliLiter(s) (50 mL/Hr) IV Continuous <Continuous>  dextrose 5%. 1000 milliLiter(s) (100 mL/Hr) IV Continuous <Continuous>  dextrose 50% Injectable 25 Gram(s) IV Push once  dextrose 50% Injectable 12.5 Gram(s) IV Push once  dextrose 50% Injectable 25 Gram(s) IV Push once  escitalopram 10 milliGRAM(s) Oral daily  glucagon  Injectable 1 milliGRAM(s) IntraMuscular once  heparin   Injectable 5000 Unit(s) SubCutaneous every 12 hours  insulin glargine Injectable (LANTUS) 8 Unit(s) SubCutaneous at bedtime  insulin lispro (ADMELOG) corrective regimen sliding scale   SubCutaneous three times a day before meals  insulin lispro (ADMELOG) corrective regimen sliding scale   SubCutaneous at bedtime  insulin lispro Injectable (ADMELOG) 6 Unit(s) SubCutaneous three times a day before meals  lisinopril 20 milliGRAM(s) Oral daily  metoprolol succinate ER 75 milliGRAM(s) Oral daily  pantoprazole    Tablet 40 milliGRAM(s) Oral before breakfast  sodium chloride 0.65% Nasal 1 Spray(s) Both Nostrils four times a day      Physical Exam  General: Patient comfortable in bed  Vital Signs Last 12 Hrs  T(F): 97.7 (07-01-22 @ 11:15), Max: 98 (07-01-22 @ 04:30)  HR: 65 (07-01-22 @ 11:15) (65 - 66)  BP: 147/64 (07-01-22 @ 11:15) (147/64 - 162/62)  BP(mean): --  RR: 18 (07-01-22 @ 11:15) (18 - 18)  SpO2: 94% (07-01-22 @ 11:15) (94% - 94%)  Neck: No palpable thyroid nodules.  CVS: S1S2, No murmurs  Respiratory: No wheezing, no crepitations  GI: Abdomen soft, bowel sounds positive  Musculoskeletal:  edema lower extremities.   Skin: No skin rashes, no ecchymosis    Diagnostics    A1C with Estimated Average Glucose: Routine (06-28 @ 13:18)  Free Thyroxine, Serum: AM Sched. Collection: 28-Jun-2022 06:00 (06-27 @ 14:03)  Thyroid Stimulating Hormone, Serum: AM Sched. Collection: 28-Jun-2022 06:00 (06-27 @ 14:03)  A1C with Estimated Average Glucose: Routine (06-27 @ 14:03)           Chief complaint  Patient is a 88y old  Female who presents with a chief complaint of syncope/ ?  seizures (01 Jul 2022 09:29)   Review of systems  Patient in bed, looks comfortable, no hypoglycemic episodes.    Labs and Fingersticks  CAPILLARY BLOOD GLUCOSE      POCT Blood Glucose.: 223 mg/dL (01 Jul 2022 12:51)  POCT Blood Glucose.: 267 mg/dL (01 Jul 2022 09:55)  POCT Blood Glucose.: 210 mg/dL (30 Jun 2022 22:11)  POCT Blood Glucose.: 196 mg/dL (30 Jun 2022 18:54)  POCT Blood Glucose.: 70 mg/dL (30 Jun 2022 17:22)                                            9.7    6.52  )-----------( 166      ( 01 Jul 2022 07:19 )             30.6     Medications  MEDICATIONS  (STANDING):  allopurinol 100 milliGRAM(s) Oral daily  aspirin enteric coated 81 milliGRAM(s) Oral daily  atorvastatin 40 milliGRAM(s) Oral at bedtime  buPROPion XL (24-Hour) . 150 milliGRAM(s) Oral daily  cephalexin 500 milliGRAM(s) Oral every 12 hours  dextrose 5%. 1000 milliLiter(s) (50 mL/Hr) IV Continuous <Continuous>  dextrose 5%. 1000 milliLiter(s) (100 mL/Hr) IV Continuous <Continuous>  dextrose 50% Injectable 25 Gram(s) IV Push once  dextrose 50% Injectable 12.5 Gram(s) IV Push once  dextrose 50% Injectable 25 Gram(s) IV Push once  escitalopram 10 milliGRAM(s) Oral daily  glucagon  Injectable 1 milliGRAM(s) IntraMuscular once  heparin   Injectable 5000 Unit(s) SubCutaneous every 12 hours  insulin glargine Injectable (LANTUS) 8 Unit(s) SubCutaneous at bedtime  insulin lispro (ADMELOG) corrective regimen sliding scale   SubCutaneous three times a day before meals  insulin lispro (ADMELOG) corrective regimen sliding scale   SubCutaneous at bedtime  insulin lispro Injectable (ADMELOG) 6 Unit(s) SubCutaneous three times a day before meals  lisinopril 20 milliGRAM(s) Oral daily  metoprolol succinate ER 75 milliGRAM(s) Oral daily  pantoprazole    Tablet 40 milliGRAM(s) Oral before breakfast  sodium chloride 0.65% Nasal 1 Spray(s) Both Nostrils four times a day      Physical Exam  General: Patient comfortable in bed  Vital Signs Last 12 Hrs  T(F): 97.7 (07-01-22 @ 11:15), Max: 98 (07-01-22 @ 04:30)  HR: 65 (07-01-22 @ 11:15) (65 - 66)  BP: 147/64 (07-01-22 @ 11:15) (147/64 - 162/62)  BP(mean): --  RR: 18 (07-01-22 @ 11:15) (18 - 18)  SpO2: 94% (07-01-22 @ 11:15) (94% - 94%)  Neck: No palpable thyroid nodules.  CVS: S1S2, No murmurs  Respiratory: No wheezing, no crepitations  GI: Abdomen soft, bowel sounds positive  Musculoskeletal:  edema lower extremities.   Skin: No skin rashes, no ecchymosis    Diagnostics    A1C with Estimated Average Glucose: Routine (06-28 @ 13:18)  Free Thyroxine, Serum: AM Sched. Collection: 28-Jun-2022 06:00 (06-27 @ 14:03)  Thyroid Stimulating Hormone, Serum: AM Sched. Collection: 28-Jun-2022 06:00 (06-27 @ 14:03)  A1C with Estimated Average Glucose: Routine (06-27 @ 14:03)

## 2022-07-01 NOTE — PROGRESS NOTE ADULT - SUBJECTIVE AND OBJECTIVE BOX
ENT ISSUE/POD: Right Epistaxis    HPI: 89 YO F with PMH of  DM, HTN, HLD, CAD present to ED 6/27/22 s/p syncopal episode. ENT was consulted for evaluation of Right Epistaxis. Per RN, pt was sleeping,  started to bleed spontaneously from mostly from Right Nare, and spit out blood. Primary team applied pressure prior to evaluation. Per pt, episode of  epistaxis on 6/26 which was controlled after applying pressure in ED. Pt takes Plavix 75mg PO,  ASA 81mg PO QD and SC Heparin . Pt denies recent Nasal covid swabs, digital trauma, NG Tube placement, NC use. Also denies N/V, SOB, sinus pain, cough, sore throat, recent URI,  constipation, runny nose, dysphagia/Odynophagia,  recent travel/sick contacts.     PAST MEDICAL & SURGICAL HISTORY:  Diabetes      Hypertension, unspecified type      Stented coronary artery      Hyperlipidemia, unspecified hyperlipidemia type        Allergies    No Known Allergies    Intolerances      MEDICATIONS  (STANDING):  allopurinol 100 milliGRAM(s) Oral daily  aspirin enteric coated 81 milliGRAM(s) Oral daily  atorvastatin 40 milliGRAM(s) Oral at bedtime  buPROPion XL (24-Hour) . 150 milliGRAM(s) Oral daily  cephalexin 500 milliGRAM(s) Oral every 12 hours  dextrose 5%. 1000 milliLiter(s) (50 mL/Hr) IV Continuous <Continuous>  dextrose 5%. 1000 milliLiter(s) (100 mL/Hr) IV Continuous <Continuous>  dextrose 50% Injectable 25 Gram(s) IV Push once  dextrose 50% Injectable 25 Gram(s) IV Push once  dextrose 50% Injectable 12.5 Gram(s) IV Push once  escitalopram 10 milliGRAM(s) Oral daily  glucagon  Injectable 1 milliGRAM(s) IntraMuscular once  heparin   Injectable 5000 Unit(s) SubCutaneous every 12 hours  insulin glargine Injectable (LANTUS) 8 Unit(s) SubCutaneous at bedtime  insulin lispro (ADMELOG) corrective regimen sliding scale   SubCutaneous three times a day before meals  insulin lispro (ADMELOG) corrective regimen sliding scale   SubCutaneous at bedtime  insulin lispro Injectable (ADMELOG) 6 Unit(s) SubCutaneous three times a day before meals  lisinopril 20 milliGRAM(s) Oral daily  metoprolol succinate ER 75 milliGRAM(s) Oral daily  metoprolol succinate ER 25 milliGRAM(s) Oral once  pantoprazole    Tablet 40 milliGRAM(s) Oral before breakfast    MEDICATIONS  (PRN):  dextrose Oral Gel 15 Gram(s) Oral once PRN Blood Glucose LESS THAN 70 milliGRAM(s)/deciliter      social history: see consult     family history: see consult   ROS:   ENT: all negative except as noted in HPI   Pulm: denies SOB, cough, hemoptysis  Neuro: denies numbness/tingling, loss of sensation  Endo: denies heat/cold intolerance, excessive sweating      Vital Signs Last 24 Hrs  T(C): 36.7 (01 Jul 2022 04:30), Max: 36.8 (30 Jun 2022 11:34)  T(F): 98 (01 Jul 2022 04:30), Max: 98.3 (30 Jun 2022 11:34)  HR: 66 (01 Jul 2022 04:30) (65 - 72)  BP: 162/62 (01 Jul 2022 04:30) (127/72 - 162/62)  BP(mean): --  RR: 18 (01 Jul 2022 04:30) (18 - 18)  SpO2: 94% (01 Jul 2022 04:30) (94% - 95%)                          9.7    6.52  )-----------( 166      ( 01 Jul 2022 07:19 )             30.6         PT/INR - ( 30 Jun 2022 01:03 )   PT: 11.3 sec;   INR: 0.97 ratio         PTT - ( 30 Jun 2022 01:03 )  PTT:30.2 sec    PHYSICAL EXAM:  Gen: NAD, On RA  Skin: No rashes, bruises, or lesions  Head: Normocephalic, Atraumatic  Face: no edema, erythema, or fluctuance. Parotid glands soft without mass  Eyes: no scleral injection  Nose: Right Nare: Bleeding lesion in anterior nasal septum cauterized. Packed with xeroform packing x1.             Left Nare: No active bleeding noted.   Mouth: No Stridor / Drooling / Trismus.  Mucosa moist, tongue/uvula midline, crusted blood in posterior oropharynx.   Neck: Flat, supple, no lymphadenopathy, trachea midline, no masses  Lymphatic: No lymphadenopathy  Resp: breathing easily, no stridor  CV: no peripheral edema/cyanosis  GI: nondistended   Peripheral vascular: no JVD or edema  Neuro: facial nerve intact, no facial droop

## 2022-07-01 NOTE — PROGRESS NOTE ADULT - SUBJECTIVE AND OBJECTIVE BOX
EP     PROGRESS  NOTE   ________________________________________________    CHIEF COMPLAINT:Patient is a 88y old  Female who presents with a chief complaint of syncope/ ?  seizures (30 Jun 2022 14:06)  no complain.  	  REVIEW OF SYSTEMS:  CONSTITUTIONAL: No fever, weight loss, or fatigue  EYES: No eye pain, visual disturbances, or discharge  ENT:  No difficulty hearing, tinnitus, vertigo; No sinus or throat pain  NECK: No pain or stiffness  RESPIRATORY: No cough, wheezing, chills or hemoptysis; No Shortness of Breath  CARDIOVASCULAR: No chest pain, palpitations, passing out, dizziness, or leg swelling  GASTROINTESTINAL: No abdominal or epigastric pain. No nausea, vomiting, or hematemesis; No diarrhea or constipation. No melena or hematochezia.  GENITOURINARY: No dysuria, frequency, hematuria, or incontinence  NEUROLOGICAL: No headaches, memory loss, loss of strength, numbness, or tremors  SKIN: No itching, burning, rashes, or lesions   LYMPH Nodes: No enlarged glands  ENDOCRINE: No heat or cold intolerance; No hair loss  MUSCULOSKELETAL: No joint pain or swelling; No muscle, back, or extremity pain  PSYCHIATRIC: No depression, anxiety, mood swings, or difficulty sleeping  HEME/LYMPH: No easy bruising, or bleeding gums  ALLERGY AND IMMUNOLOGIC: No hives or eczema	    [ ] All others negative	  [ ] Unable to obtain    PHYSICAL EXAM:  T(C): 36.7 (07-01-22 @ 04:30), Max: 36.8 (06-30-22 @ 11:34)  HR: 66 (07-01-22 @ 04:30) (65 - 72)  BP: 162/62 (07-01-22 @ 04:30) (127/72 - 162/62)  RR: 18 (07-01-22 @ 04:30) (18 - 18)  SpO2: 94% (07-01-22 @ 04:30) (94% - 95%)  Wt(kg): --  I&O's Summary    30 Jun 2022 07:01  -  01 Jul 2022 07:00  --------------------------------------------------------  IN: 840 mL / OUT: 400 mL / NET: 440 mL        Appearance: Normal	  HEENT:   Normal oral mucosa, PERRL, EOMI	  Lymphatic: No lymphadenopathy  Cardiovascular: Normal S1 S2, No JVD, + murmurs, No edema  Respiratory: Lungs clear to auscultation	  Psychiatry: A & O x 3, Mood & affect appropriate  Gastrointestinal:  Soft, Non-tender, + BS	  Skin: No rashes, No ecchymoses, No cyanosis	  Neurologic: Non-focal  Extremities: Normal range of motion, No clubbing, cyanosis or edema  Vascular: Peripheral pulses palpable 2+ bilaterally    MEDICATIONS  (STANDING):  allopurinol 100 milliGRAM(s) Oral daily  aspirin enteric coated 81 milliGRAM(s) Oral daily  atorvastatin 40 milliGRAM(s) Oral at bedtime  buPROPion XL (24-Hour) . 150 milliGRAM(s) Oral daily  cephalexin 500 milliGRAM(s) Oral every 12 hours  dextrose 5%. 1000 milliLiter(s) (50 mL/Hr) IV Continuous <Continuous>  dextrose 5%. 1000 milliLiter(s) (100 mL/Hr) IV Continuous <Continuous>  dextrose 50% Injectable 25 Gram(s) IV Push once  dextrose 50% Injectable 25 Gram(s) IV Push once  dextrose 50% Injectable 12.5 Gram(s) IV Push once  escitalopram 10 milliGRAM(s) Oral daily  glucagon  Injectable 1 milliGRAM(s) IntraMuscular once  heparin   Injectable 5000 Unit(s) SubCutaneous every 12 hours  insulin glargine Injectable (LANTUS) 8 Unit(s) SubCutaneous at bedtime  insulin lispro (ADMELOG) corrective regimen sliding scale   SubCutaneous three times a day before meals  insulin lispro (ADMELOG) corrective regimen sliding scale   SubCutaneous at bedtime  insulin lispro Injectable (ADMELOG) 6 Unit(s) SubCutaneous three times a day before meals  lisinopril 20 milliGRAM(s) Oral daily  metoprolol succinate ER 50 milliGRAM(s) Oral daily  pantoprazole    Tablet 40 milliGRAM(s) Oral before breakfast      TELEMETRY: 	    ECG:  	  RADIOLOGY:  OTHER: 	  	  LABS:	 	    CARDIAC MARKERS:    < from: Transthoracic Echocardiogram (06.28.22 @ 11:17) >  1. Septal hypertrophy.  2. Hyperdynamic left ventricular systolic function.  3. Mild diastolic dysfunction (Stage I).  4. Normal right ventricular size and function.    < end of copied text >                              9.4    6.81  )-----------( 146      ( 30 Jun 2022 06:39 )             30.2           proBNP:   Lipid Profile:   HgA1c:   TSH: Thyroid Stimulating Hormone, Serum: 2.47 uIU/mL (06-28 @ 07:19)    PT/INR - ( 30 Jun 2022 01:03 )   PT: 11.3 sec;   INR: 0.97 ratio         PTT - ( 30 Jun 2022 01:03 )  PTT:30.2 sec      Assessment and plan  ---------------------------  88 year old RH female with a past medical history of HTN, HLD, DM, CAD with stents who is presenting as a transfer from [place] for evaluation of possible seizure episode.    At baseline, the patient is ambulatory with a cane when she goes outside, lives alone and performs her own ADLs. The patient had an episode of nose bleeding for which she first presented to Urgent Care and then went to [facility] ED where her bleeding spontaneously stopped and she was discharged home. She then went home and was sitting down and eating a pastry when she told her daughter who was present that the patient felt like she was going to faint. The patient stated she had symptoms of her vision darkening, a symptom of epigastric discomfort and nausea, sweating and then had lost consciousness. As per the daughter, the patient began to lean back, her eyes rolled back, and to avoid falling backwards and vomiting was pushed forward by the daughter. The episode lasted several seconds and then the patient regained consciousness. EMS was called.    The patient then seemed slightly disoriented  which lasted several minutes and had completely resolved by the time that EMS had arrived. It was noted that there was some liquid by the patient which was concerning for urinary incontinence however it unclear as the patient stated that her undergarments and dress were dry. The patient was then brought to the ED at [place] and was to undergo cardiac workup and was transferred to [facility] as there was no EEG monitoring. Currently, the patient states that she feels well and denied headaches, visual changes, auditory changes, numbness, tingling, weakness.  Patient notes that in the past she had a fall several months ago while she was ambulating and thinks that she may have lost consciousness during that time.  Patient stated that in the past when she was 10 years old, she was diagnosed with seizures. These episodes were marked by not being able to speak for a brief period of time and disorientation. Stated that she feels that she might remember these episodes but was unsure. She denied taking medications for these events and noted that when she underwent menarche, these episodes stopped and she has not had any since.  Is taking Wellbutrin and has been taking the same dose for 16 years as per daughter.   pt with sig cardiac hx s/p multiple stents ?new LBBB  tele nsr  echo  may need ischemia goldstein if not done recently  will adjust cardiac meds  severe orthostatic hypotension, keep sbp >140 at rest sitting  clive stocking  am cortisol lervel  echo noted with septal hypertrophy but no lvot gradient  orthostatisms is improved, iv hydration  no calcium channel blocker sec to orthostatic hypotension  ischemia goldstein as per card  increase beta blocker

## 2022-07-01 NOTE — PROGRESS NOTE ADULT - SUBJECTIVE AND OBJECTIVE BOX
afebrile  REVIEW OF SYSTEMS:  GEN: no fever,    no chills  RESP: no SOB,   no cough  CVS: no chest pain,   no palpitations  GI: no abdominal pain,   no nausea,   no vomiting,   no constipation,   no diarrhea  : no dysuria,   no frequency  NEURO: no headache,   no dizziness  PSYCH: no depression,   not anxious  Derm : no rash    MEDICATIONS  (STANDING):  allopurinol 100 milliGRAM(s) Oral daily  aspirin enteric coated 81 milliGRAM(s) Oral daily  atorvastatin 40 milliGRAM(s) Oral at bedtime  buPROPion XL (24-Hour) . 150 milliGRAM(s) Oral daily  cephalexin 500 milliGRAM(s) Oral every 12 hours  dextrose 5%. 1000 milliLiter(s) (50 mL/Hr) IV Continuous <Continuous>  dextrose 5%. 1000 milliLiter(s) (100 mL/Hr) IV Continuous <Continuous>  dextrose 50% Injectable 25 Gram(s) IV Push once  dextrose 50% Injectable 25 Gram(s) IV Push once  dextrose 50% Injectable 12.5 Gram(s) IV Push once  escitalopram 10 milliGRAM(s) Oral daily  glucagon  Injectable 1 milliGRAM(s) IntraMuscular once  heparin   Injectable 5000 Unit(s) SubCutaneous every 12 hours  insulin glargine Injectable (LANTUS) 8 Unit(s) SubCutaneous at bedtime  insulin lispro (ADMELOG) corrective regimen sliding scale   SubCutaneous three times a day before meals  insulin lispro (ADMELOG) corrective regimen sliding scale   SubCutaneous at bedtime  insulin lispro Injectable (ADMELOG) 6 Unit(s) SubCutaneous three times a day before meals  lisinopril 20 milliGRAM(s) Oral daily  metoprolol succinate ER 75 milliGRAM(s) Oral daily  metoprolol succinate ER 25 milliGRAM(s) Oral once  pantoprazole    Tablet 40 milliGRAM(s) Oral before breakfast    MEDICATIONS  (PRN):  dextrose Oral Gel 15 Gram(s) Oral once PRN Blood Glucose LESS THAN 70 milliGRAM(s)/deciliter      Vital Signs Last 24 Hrs  T(C): 36.7 (01 Jul 2022 04:30), Max: 36.8 (30 Jun 2022 11:34)  T(F): 98 (01 Jul 2022 04:30), Max: 98.3 (30 Jun 2022 11:34)  HR: 66 (01 Jul 2022 04:30) (65 - 72)  BP: 162/62 (01 Jul 2022 04:30) (127/72 - 162/62)  BP(mean): --  RR: 18 (01 Jul 2022 04:30) (18 - 18)  SpO2: 94% (01 Jul 2022 04:30) (94% - 95%)  CAPILLARY BLOOD GLUCOSE      POCT Blood Glucose.: 210 mg/dL (30 Jun 2022 22:11)  POCT Blood Glucose.: 196 mg/dL (30 Jun 2022 18:54)  POCT Blood Glucose.: 70 mg/dL (30 Jun 2022 17:22)  POCT Blood Glucose.: 195 mg/dL (30 Jun 2022 12:43)  POCT Blood Glucose.: 171 mg/dL (30 Jun 2022 09:04)    I&O's Summary    30 Jun 2022 07:01  -  01 Jul 2022 07:00  --------------------------------------------------------  IN: 840 mL / OUT: 400 mL / NET: 440 mL        PHYSICAL EXAM:  HEAD:  Atraumatic, Normocephalic  NECK: Supple, No   JVD  CHEST/LUNG:   no     rales,     no,    rhonchi  HEART: Regular rate and rhythm;         murmur  ABDOMEN: Soft, Nontender, ;   EXTREMITIES:    no    edema  NEUROLOGY:  alert    LABS:                        9.7    6.52  )-----------( 166      ( 01 Jul 2022 07:19 )             30.6           PT/INR - ( 30 Jun 2022 01:03 )   PT: 11.3 sec;   INR: 0.97 ratio         PTT - ( 30 Jun 2022 01:03 )  PTT:30.2 sec                Thyroid Stimulating Hormone, Serum: 2.47 uIU/mL (06-28 @ 07:19)          Consultant(s) Notes Reviewed:      Care Discussed with Consultants/Other Providers:

## 2022-07-01 NOTE — PROGRESS NOTE ADULT - ASSESSMENT
87 yo female with PMHX DM, HTN, HLD, CAD present to ED s/p syncopal episode     - episode was felt to be most likely vagally mediated, with epistaxis earlier in the day  - that being said, patient with LBBB (of unclear chronicity) and reported CAD  - has also severely orthostatic, with drop in sbp of ~50mmHg, now seemingly improved  - would continue to watch on telemetry  - echocardiogram with hyperdynamic lv function  - noting orthostatic bps would encourage adequate hydration, compression stockings. would not add midodrine or florinef noting generally adequate upright bp documented so far, and baseline hypertension to 170s  - cont metoprolol and lisinopril, which have been uptitrated  - cont check orthostatics daily  - will allow for some degree of permissive supine HTN if still orthostatic    - need to obtain prior cardiac history, and may end up needing a pharm stress but there is no urgency for this given clinical scenario. appears that syncope was likely volume related. if has no reliable fu would consider inpatient pharm   - ep evaluation noted     - cont asa alone  - off plavix given epistaxis  - cont statin     - trend creatinine and electrolytes. Keep K>4, mg>2  - will follow with you

## 2022-07-01 NOTE — PROGRESS NOTE ADULT - ASSESSMENT
89 YO F with PMH of  DM, HTN, HLD, CAD present to ED 6/27/22 s/p syncopal episode. ENT was consulted for evaluation of Right Epistaxis. Per RN, pt was sleeping, started to bleed spontaneously from mostly from Right Nare, and spit out blood. Primary team applied pressure prior to evaluation. Pt takes Plavix 75mg PO,  ASA 81mg PO QD and SC Heparin . Right Epistaxis: Anterior nasal septal bleeding lesion cauterized. Packed with xeroform x1.

## 2022-07-01 NOTE — PROGRESS NOTE ADULT - ASSESSMENT
88 year old RH female      h/o HTN, HLD, DM, CAD with stents     who is presenting as a transfer from [place] for evaluation of possible seizure episode./  and  s/p  syncope   LOC episode with prodrome of darkening of vision, sweating, nausea with history of  cad, need  to  r/o  cardiac  causes   per  neuro,  no  seizures meds    tele.  r/o  arrythmia   echo/  card d r golyan   HYN/ HLD  on  asa.  lisinoril, norvasc   CAD/  stents, on asa./ plavix/  lipitor   DM,  follow  fs, on  janumet   depression, on bupropion,  lexapro  on  dvt  ppx   +  orthostatic,  on iv fluids    echo, normal  ef/ card   n buck/  ep dr hudson jane   anemia,  guaiac  +/   ?  from her epistaxix/   hb stable    s/p epistaxix  again/ nasal packing  pe r ent/  eeg. no  seizures  noted, hb is 9/  afebrile

## 2022-07-01 NOTE — EEG REPORT - NS EEG TEXT BOX
[facility]   COMPREHENSIVE EPILEPSY CENTER   REPORT OF LONG-TERM VIDEO EEG     [facility]: [address], [place] [address], Ph#: [phone]  [facility]: [address], [place] [address], Ph#: [phone]  [facility]: [address] [place] [address], Ph#: [phone]    Patient Name: ANIKA CHISHOLM  Age and : 88y (34)  MRN #: [id number]  Location: [address]  Referring Physician: Goldie Ho    Start Time/Date: 08:00 on 22  End Time/Date: 19:49 on 22  Duration: 11 h 49 min    _____________________________________________________________  STUDY INFORMATION    EEG Recording Technique:  The patient underwent continuous Video-EEG monitoring, using Telemetry System hardware on the XLTek Digital System. EEG and video data were stored on a computer hard drive with important events saved in digital archive files. The material was reviewed by a physician (electroencephalographer / epileptologist) on a daily basis. Carlos and seizure detection algorithms were utilized and reviewed. An EEG Technician attended to the patient, and was available throughout daytime work hours.  The epilepsy center neurologist was available in person or on call 24-hours per day.    EEG Placement and Labeling of Electrodes:  The EEG was performed utilizing 20 channel referential EEG connections (coronal over temporal over parasagittal montage) using all standard 10-20 electrode placements with EKG, with additional electrodes placed in the inferior temporal region using the modified 10-10 montage electrode placements for elective admissions, or if deemed necessary. Recording was at a sampling rate of 256 samples per second per channel. Time synchronized digital video recording was done simultaneously with EEG recording. A low light infrared camera was used for low light recording.     _____________________________________________________________  HISTORY    Patient is a 88y old  Female who presents with a chief complaint of syncope/ ?  seizures (2022 08:10)      PERTINENT MEDICATION:  MEDICATIONS  (STANDING):  allopurinol 100 milliGRAM(s) Oral daily  aspirin enteric coated 81 milliGRAM(s) Oral daily  atorvastatin 40 milliGRAM(s) Oral at bedtime  buPROPion XL (24-Hour) . 150 milliGRAM(s) Oral daily  cephalexin 500 milliGRAM(s) Oral every 12 hours  clopidogrel Tablet 75 milliGRAM(s) Oral daily  dextrose 5%. 1000 milliLiter(s) (50 mL/Hr) IV Continuous <Continuous>  dextrose 5%. 1000 milliLiter(s) (100 mL/Hr) IV Continuous <Continuous>  dextrose 50% Injectable 25 Gram(s) IV Push once  dextrose 50% Injectable 12.5 Gram(s) IV Push once  dextrose 50% Injectable 25 Gram(s) IV Push once  escitalopram 10 milliGRAM(s) Oral daily  glucagon  Injectable 1 milliGRAM(s) IntraMuscular once  heparin   Injectable 5000 Unit(s) SubCutaneous every 12 hours  insulin glargine Injectable (LANTUS) 6 Unit(s) SubCutaneous at bedtime  insulin lispro (ADMELOG) corrective regimen sliding scale   SubCutaneous three times a day before meals  insulin lispro (ADMELOG) corrective regimen sliding scale   SubCutaneous at bedtime  insulin lispro Injectable (ADMELOG) 6 Unit(s) SubCutaneous three times a day before meals  lisinopril 20 milliGRAM(s) Oral daily  metoprolol succinate ER 50 milliGRAM(s) Oral daily  pantoprazole    Tablet 40 milliGRAM(s) Oral before breakfast    _____________________________________________________________  STUDY INTERPRETATION    Findings: The background was continuous, spontaneously variable and reactive. During wakefulness, the posterior dominant rhythm consisted of symmetric, well-modulated 8-9 Hz activity, with amplitude to 30 uV, that attenuated to eye opening.      Background Slowing:  No generalized background slowing was present.    Focal Slowing:   None were present.    Sleep Background:  Drowsiness was characterized by fragmentation, attenuation, and slowing of the background activity.    Sleep was characterized by the presence of vertex waves, symmetric sleep spindles and K-complexes.    Other Non-Epileptiform Findings:  None were present.    Interictal Epileptiform Activity:   None were present.    Events:  Clinical events: None recorded.  Seizures: None recorded.    Activation Procedures:   Hyperventilation was not performed.    Photic stimulation was not performed.     Artifacts:  Intermittent myogenic and movement artifacts were noted.    ECG:  The heart rate on single channel ECG was predominantly between 60-70 BPM.    _____________________________________________________________  EEG SUMMARY/CLASSIFICATION    Normal EEG in the awake, drowsy and asleep states.    _____________________________________________________________  EEG IMPRESSION/CLINICAL CORRELATE    Normal EEG study.  No epileptiform pattern or seizure seen.  _____________________________________________________________    Arleen Javier DO  Attending Physician, [facility]ile[facility]

## 2022-07-01 NOTE — PROGRESS NOTE ADULT - SUBJECTIVE AND OBJECTIVE BOX
INTERVAL HPI/OVERNIGHT EVENTS:  no CP or SOB  no dizziness  ambulating hallway with PT and daughter this morning  nasal packing in place  no transfusion requirement    MEDICATIONS  (STANDING):  allopurinol 100 milliGRAM(s) Oral daily  aspirin enteric coated 81 milliGRAM(s) Oral daily  atorvastatin 40 milliGRAM(s) Oral at bedtime  buPROPion XL (24-Hour) . 150 milliGRAM(s) Oral daily  cephalexin 500 milliGRAM(s) Oral every 12 hours  dextrose 5%. 1000 milliLiter(s) (50 mL/Hr) IV Continuous <Continuous>  dextrose 5%. 1000 milliLiter(s) (100 mL/Hr) IV Continuous <Continuous>  dextrose 50% Injectable 25 Gram(s) IV Push once  dextrose 50% Injectable 12.5 Gram(s) IV Push once  dextrose 50% Injectable 25 Gram(s) IV Push once  escitalopram 10 milliGRAM(s) Oral daily  glucagon  Injectable 1 milliGRAM(s) IntraMuscular once  heparin   Injectable 5000 Unit(s) SubCutaneous every 12 hours  insulin glargine Injectable (LANTUS) 8 Unit(s) SubCutaneous at bedtime  insulin lispro (ADMELOG) corrective regimen sliding scale   SubCutaneous three times a day before meals  insulin lispro (ADMELOG) corrective regimen sliding scale   SubCutaneous at bedtime  insulin lispro Injectable (ADMELOG) 6 Unit(s) SubCutaneous three times a day before meals  lisinopril 20 milliGRAM(s) Oral daily  metoprolol succinate ER 75 milliGRAM(s) Oral daily  pantoprazole    Tablet 40 milliGRAM(s) Oral before breakfast  sodium chloride 0.65% Nasal 1 Spray(s) Both Nostrils four times a day    MEDICATIONS  (PRN):  dextrose Oral Gel 15 Gram(s) Oral once PRN Blood Glucose LESS THAN 70 milliGRAM(s)/deciliter      Allergies  No Known Allergies      Review of Systems:  General:  No wt loss, fevers, chills, night sweats, fatigue  CV:  No pain, +CAD s/p stents +LBBB +HTN ; see HPI  Resp:  No dyspnea, cough, tachypnea, wheezing  GI:  see HPI  :  No pain, bleeding, incontinence, nocturia  Muscle:  No pain, weakness  Neuro:  No weakness, tingling, memory problems  Psych:  No fatigue, insomnia, mood problems, depression  Endocrine:  No polyuria, polydypsia, cold/heat intolerance  Heme:  No petechiae, ecchymosis, easy bruisability  +epistaxis (see hPI)  Skin:  No rash, tattoos, scars, edema      Vital Signs Last 24 Hrs  T(C): 36.5 (01 Jul 2022 11:15), Max: 36.7 (30 Jun 2022 20:54)  T(F): 97.7 (01 Jul 2022 11:15), Max: 98.1 (30 Jun 2022 20:54)  HR: 65 (01 Jul 2022 11:15) (65 - 68)  BP: 147/64 (01 Jul 2022 11:15) (147/64 - 162/62)  BP(mean): --  RR: 18 (01 Jul 2022 11:15) (18 - 18)  SpO2: 94% (01 Jul 2022 11:15) (94% - 94%)    PHYSICAL EXAM:  Constitutional: NAD, well-developed elderly female  sitting OOB to chair, daughter at bedside  Neck: No LAD, supple no JVD  Respiratory: Clear b/l no accessory muscle use  Cardiovascular: S1 and S2, RRR  Gastrointestinal: BS+, soft, NT/ND, neg HSM  Extremities: No peripheral edema, neg clubbing, cyanosis  Vascular: 2+ peripheral pulses  Neurological: A/O x 3, no focal deficits  Psychiatric: Normal mood, normal affect  Skin: No rashes, anicteric      LABS:                        9.7    6.52  )-----------( 166      ( 01 Jul 2022 07:19 )             30.6           PT/INR - ( 30 Jun 2022 01:03 )   PT: 11.3 sec;   INR: 0.97 ratio    PTT - ( 30 Jun 2022 01:03 )  PTT:30.2 sec        RADIOLOGY & ADDITIONAL TESTS:

## 2022-07-01 NOTE — PROGRESS NOTE ADULT - ASSESSMENT
Assessment  DMT2: 88y Female with DM T2 with hyperglycemia, A1C 7.6%, was on oral meds/Janumet at home, now on low-dose basal bolus insulin, increased Lantus dose yesterday, blood sugars are fluctuating within overall acceptable range, no hypoglycemic episodes, eating meals, no acute events.  Syncope: AM cortisol WNL, workup in progress, monitored.  HTN: Controlled,  on antihypertensive medications.  HLD: On statin.      Jaspal Augustine MD  Cell: [phone]  Office: [phone]       Assessment  DMT2: 88y Female with DM T2 with hyperglycemia, A1C 7.6%, was on oral meds/Janumet at home, now on low-dose basal bolus insulin, increased Lantus  dose yesterday, blood sugars are fluctuating within overall acceptable range, no hypoglycemic episodes, eating meals, no acute events.  Syncope: AM cortisol WNL, workup in progress, monitored.  HTN: Controlled,  on antihypertensive medications.  HLD: On statin.      Jaspal Augustine MD  Cell: [phone]  Office: [phone]

## 2022-07-02 LAB
GLUCOSE BLDC GLUCOMTR-MCNC: 147 MG/DL — HIGH (ref 70–99)
GLUCOSE BLDC GLUCOMTR-MCNC: 175 MG/DL — HIGH (ref 70–99)
GLUCOSE BLDC GLUCOMTR-MCNC: 201 MG/DL — HIGH (ref 70–99)
GLUCOSE BLDC GLUCOMTR-MCNC: 278 MG/DL — HIGH (ref 70–99)

## 2022-07-02 PROCEDURE — 99232 SBSQ HOSP IP/OBS MODERATE 35: CPT

## 2022-07-02 RX ORDER — LISINOPRIL 2.5 MG/1
40 TABLET ORAL DAILY
Refills: 0 | Status: DISCONTINUED | OUTPATIENT
Start: 2022-07-02 | End: 2022-07-06

## 2022-07-02 RX ORDER — METOPROLOL TARTRATE 50 MG
100 TABLET ORAL DAILY
Refills: 0 | Status: DISCONTINUED | OUTPATIENT
Start: 2022-07-02 | End: 2022-07-06

## 2022-07-02 RX ORDER — METOPROLOL TARTRATE 50 MG
25 TABLET ORAL ONCE
Refills: 0 | Status: COMPLETED | OUTPATIENT
Start: 2022-07-02 | End: 2022-07-02

## 2022-07-02 RX ORDER — SODIUM CHLORIDE 9 MG/ML
1000 INJECTION INTRAMUSCULAR; INTRAVENOUS; SUBCUTANEOUS
Refills: 0 | Status: DISCONTINUED | OUTPATIENT
Start: 2022-07-02 | End: 2022-07-03

## 2022-07-02 RX ADMIN — Medication 1: at 21:27

## 2022-07-02 RX ADMIN — Medication 25 MILLIGRAM(S): at 09:15

## 2022-07-02 RX ADMIN — INSULIN GLARGINE 8 UNIT(S): 100 INJECTION, SOLUTION SUBCUTANEOUS at 21:27

## 2022-07-02 RX ADMIN — ATORVASTATIN CALCIUM 40 MILLIGRAM(S): 80 TABLET, FILM COATED ORAL at 21:28

## 2022-07-02 RX ADMIN — Medication 1: at 13:11

## 2022-07-02 RX ADMIN — Medication 6 UNIT(S): at 09:16

## 2022-07-02 RX ADMIN — HEPARIN SODIUM 5000 UNIT(S): 5000 INJECTION INTRAVENOUS; SUBCUTANEOUS at 17:59

## 2022-07-02 RX ADMIN — PANTOPRAZOLE SODIUM 40 MILLIGRAM(S): 20 TABLET, DELAYED RELEASE ORAL at 05:46

## 2022-07-02 RX ADMIN — Medication 6 UNIT(S): at 17:58

## 2022-07-02 RX ADMIN — Medication 1 SPRAY(S): at 17:59

## 2022-07-02 RX ADMIN — Medication 1 SPRAY(S): at 05:47

## 2022-07-02 RX ADMIN — Medication 2: at 09:16

## 2022-07-02 RX ADMIN — LISINOPRIL 20 MILLIGRAM(S): 2.5 TABLET ORAL at 04:34

## 2022-07-02 RX ADMIN — BUPROPION HYDROCHLORIDE 150 MILLIGRAM(S): 150 TABLET, EXTENDED RELEASE ORAL at 09:17

## 2022-07-02 RX ADMIN — Medication 500 MILLIGRAM(S): at 05:47

## 2022-07-02 RX ADMIN — Medication 81 MILLIGRAM(S): at 09:17

## 2022-07-02 RX ADMIN — Medication 1 SPRAY(S): at 11:31

## 2022-07-02 RX ADMIN — ESCITALOPRAM OXALATE 10 MILLIGRAM(S): 10 TABLET, FILM COATED ORAL at 09:17

## 2022-07-02 RX ADMIN — Medication 6 UNIT(S): at 13:11

## 2022-07-02 RX ADMIN — Medication 75 MILLIGRAM(S): at 04:34

## 2022-07-02 RX ADMIN — Medication 100 MILLIGRAM(S): at 09:17

## 2022-07-02 RX ADMIN — SODIUM CHLORIDE 70 MILLILITER(S): 9 INJECTION INTRAMUSCULAR; INTRAVENOUS; SUBCUTANEOUS at 14:50

## 2022-07-02 RX ADMIN — Medication 500 MILLIGRAM(S): at 17:57

## 2022-07-02 RX ADMIN — HEPARIN SODIUM 5000 UNIT(S): 5000 INJECTION INTRAVENOUS; SUBCUTANEOUS at 05:46

## 2022-07-02 RX ADMIN — SODIUM CHLORIDE 70 MILLILITER(S): 9 INJECTION INTRAMUSCULAR; INTRAVENOUS; SUBCUTANEOUS at 21:31

## 2022-07-02 NOTE — PROGRESS NOTE ADULT - ASSESSMENT
88 year old RH female      h/o HTN, HLD, DM, CAD with stents     who is presenting as a transfer from [place] for evaluation of possible seizure episode./  and  s/p  syncope   LOC episode with prodrome of darkening of vision, sweating, nausea with history of  cad, need  to  r/o  cardiac  causes   per  neuro,  no  seizures meds    tele.  r/o  arrythmia   echo/  card  dr hudson jane/ melissa   HYN/ HLD  on  asa.  lisinoril, norvasc   CAD/  stents, on asa./ plavix/  lipitor   DM,  follow  fs, on  janumet   depression, on bupropion,  lexapro  on  dvt  ppx   +  orthostatic, was  on iv fluids    echo, normal  ef/ card   hudson jane/  ep dr hudson jane   anemia,  guaiac  +/   ?  from her epistaxix/   hb stable    s/p epistaxix  again/ nasal packing  pe r ent/  eeg. no  seizures  noted, hb is 9/  afebrile  pt  with  intermittent   oozing  from he r nosteril,  ent  to  f/p/  bp  meds  adjusted

## 2022-07-02 NOTE — PROGRESS NOTE ADULT - ASSESSMENT
Assessment  DMT2: 88y Female with DM T2 with hyperglycemia, A1C 7.6%, was on oral meds/Janumet at home, now on low-dose basal bolus insulin, blood sugars acceptable range, no hypoglycemic episodes, eating meals, no acute events.  Syncope: AM cortisol WNL, workup in progress, monitored.  HTN: Controlled,  on antihypertensive medications.  HLD: On statin.      Jaspal Augustine MD  Cell: [phone]  Office: [phone]

## 2022-07-02 NOTE — PROGRESS NOTE ADULT - ASSESSMENT
87 yo female with PMHX DM, HTN, HLD, CAD present to ED s/p syncopal episode     - episode was felt to be most likely vagally mediated, with epistaxis earlier in the day  - that being said, patient with LBBB (of unclear chronicity) and reported CAD  - has also severely orthostatic, with drop in sbp of ~50mmHg, now seemingly improved  - would continue to watch on telemetry  - echocardiogram with hyperdynamic lv function  - noting orthostatic bps would encourage adequate hydration, compression stockings. would not add midodrine or florinef noting generally adequate upright bp documented so far, and baseline hypertension to 170s  - cont metoprolol and lisinopril, which have been uptitrated. given persistent hypertension without orthostasis, would increase lisinopril to 40 daily  - cont check orthostatics daily-last checked 6/29 and was not orthostatic     - need to obtain prior cardiac history, and may end up needing a pharm stress but there is no urgency for this given clinical scenario.   -given epistaxis would prefer not to pursue an ischemic eval at this time anyway  - appears that syncope was likely volume related.   - ep evaluation noted     - cont asa alone  - off plavix given epistaxis  - cont statin     - trend creatinine and electrolytes. Keep K>4, mg>2  - will follow with you

## 2022-07-02 NOTE — PROGRESS NOTE ADULT - SUBJECTIVE AND OBJECTIVE BOX
afberile  REVIEW OF SYSTEMS:  GEN: no fever,    no chills  RESP: no SOB,   no cough  CVS: no chest pain,   no palpitations  GI: no abdominal pain,   no nausea,   no vomiting,   no constipation,   no diarrhea  : no dysuria,   no frequency  NEURO: no headache,   no dizziness  PSYCH: no depression,   not anxious  Derm : no rash    MEDICATIONS  (STANDING):  allopurinol 100 milliGRAM(s) Oral daily  aspirin enteric coated 81 milliGRAM(s) Oral daily  atorvastatin 40 milliGRAM(s) Oral at bedtime  buPROPion XL (24-Hour) . 150 milliGRAM(s) Oral daily  cephalexin 500 milliGRAM(s) Oral every 12 hours  dextrose 5%. 1000 milliLiter(s) (50 mL/Hr) IV Continuous <Continuous>  dextrose 5%. 1000 milliLiter(s) (100 mL/Hr) IV Continuous <Continuous>  dextrose 50% Injectable 25 Gram(s) IV Push once  dextrose 50% Injectable 12.5 Gram(s) IV Push once  dextrose 50% Injectable 25 Gram(s) IV Push once  escitalopram 10 milliGRAM(s) Oral daily  glucagon  Injectable 1 milliGRAM(s) IntraMuscular once  heparin   Injectable 5000 Unit(s) SubCutaneous every 12 hours  insulin glargine Injectable (LANTUS) 8 Unit(s) SubCutaneous at bedtime  insulin lispro (ADMELOG) corrective regimen sliding scale   SubCutaneous three times a day before meals  insulin lispro (ADMELOG) corrective regimen sliding scale   SubCutaneous at bedtime  insulin lispro Injectable (ADMELOG) 6 Unit(s) SubCutaneous three times a day before meals  lisinopril 20 milliGRAM(s) Oral daily  metoprolol succinate ER 75 milliGRAM(s) Oral daily  pantoprazole    Tablet 40 milliGRAM(s) Oral before breakfast  sodium chloride 0.65% Nasal 1 Spray(s) Both Nostrils four times a day    MEDICATIONS  (PRN):  dextrose Oral Gel 15 Gram(s) Oral once PRN Blood Glucose LESS THAN 70 milliGRAM(s)/deciliter      Vital Signs Last 24 Hrs  T(C): 36.4 (02 Jul 2022 04:13), Max: 36.5 (01 Jul 2022 11:15)  T(F): 97.6 (02 Jul 2022 04:13), Max: 97.7 (01 Jul 2022 11:15)  HR: 70 (02 Jul 2022 06:15) (65 - 76)  BP: 162/74 (02 Jul 2022 06:15) (147/64 - 177/80)  BP(mean): --  RR: 18 (02 Jul 2022 04:13) (18 - 18)  SpO2: 98% (02 Jul 2022 04:13) (94% - 98%)  CAPILLARY BLOOD GLUCOSE      POCT Blood Glucose.: 225 mg/dL (01 Jul 2022 21:21)  POCT Blood Glucose.: 107 mg/dL (01 Jul 2022 17:33)  POCT Blood Glucose.: 223 mg/dL (01 Jul 2022 12:51)  POCT Blood Glucose.: 267 mg/dL (01 Jul 2022 09:55)    I&O's Summary    01 Jul 2022 07:01  -  02 Jul 2022 07:00  --------------------------------------------------------  IN: 840 mL / OUT: 0 mL / NET: 840 mL        PHYSICAL EXAM:  HEAD:  Atraumatic, Normocephalic  NECK: Supple, No   JVD  CHEST/LUNG:   no     rales,     no,    rhonchi  HEART: Regular rate and rhythm;         murmur  ABDOMEN: Soft, Nontender, ;   EXTREMITIES:     no   edema  NEUROLOGY:  alert    LABS:                        9.7    6.52  )-----------( 166      ( 01 Jul 2022 07:19 )             30.6                           Thyroid Stimulating Hormone, Serum: 2.47 uIU/mL (06-28 @ 07:19)          Consultant(s) Notes Reviewed:      Care Discussed with Consultants/Other Providers:

## 2022-07-02 NOTE — PROGRESS NOTE ADULT - SUBJECTIVE AND OBJECTIVE BOX
Chief complaint    Patient is a 88y old  Female who presents with a chief complaint of syncope/ ?  seizures (02 Jul 2022 08:46)   Review of systems  Patient in bed, appears comfortable.    Labs and Fingersticks  CAPILLARY BLOOD GLUCOSE      POCT Blood Glucose.: 175 mg/dL (02 Jul 2022 12:26)  POCT Blood Glucose.: 201 mg/dL (02 Jul 2022 08:18)  POCT Blood Glucose.: 225 mg/dL (01 Jul 2022 21:21)  POCT Blood Glucose.: 107 mg/dL (01 Jul 2022 17:33)                         9.7    6.52  )-----------( 166      ( 01 Jul 2022 07:19 )             30.6     Medications  MEDICATIONS  (STANDING):  allopurinol 100 milliGRAM(s) Oral daily  aspirin enteric coated 81 milliGRAM(s) Oral daily  atorvastatin 40 milliGRAM(s) Oral at bedtime  buPROPion XL (24-Hour) . 150 milliGRAM(s) Oral daily  cephalexin 500 milliGRAM(s) Oral every 12 hours  dextrose 5%. 1000 milliLiter(s) (50 mL/Hr) IV Continuous <Continuous>  dextrose 5%. 1000 milliLiter(s) (100 mL/Hr) IV Continuous <Continuous>  dextrose 50% Injectable 25 Gram(s) IV Push once  dextrose 50% Injectable 12.5 Gram(s) IV Push once  dextrose 50% Injectable 25 Gram(s) IV Push once  escitalopram 10 milliGRAM(s) Oral daily  glucagon  Injectable 1 milliGRAM(s) IntraMuscular once  heparin   Injectable 5000 Unit(s) SubCutaneous every 12 hours  insulin glargine Injectable (LANTUS) 8 Unit(s) SubCutaneous at bedtime  insulin lispro (ADMELOG) corrective regimen sliding scale   SubCutaneous three times a day before meals  insulin lispro (ADMELOG) corrective regimen sliding scale   SubCutaneous at bedtime  insulin lispro Injectable (ADMELOG) 6 Unit(s) SubCutaneous three times a day before meals  lisinopril 40 milliGRAM(s) Oral daily  metoprolol succinate  milliGRAM(s) Oral daily  pantoprazole    Tablet 40 milliGRAM(s) Oral before breakfast  sodium chloride 0.65% Nasal 1 Spray(s) Both Nostrils four times a day  sodium chloride 0.9%. 1000 milliLiter(s) (70 mL/Hr) IV Continuous <Continuous>      Physical Exam  General: Patient comfortable in bed  Vital Signs Last 12 Hrs  T(F): 97.6 (07-02-22 @ 11:08), Max: 97.6 (07-02-22 @ 04:13)  HR: 70 (07-02-22 @ 06:15) (70 - 74)  BP: 162/74 (07-02-22 @ 06:15) (162/74 - 177/80)  BP(mean): --  RR: 18 (07-02-22 @ 04:13) (18 - 18)  SpO2: 98% (07-02-22 @ 04:13) (98% - 98%)  Neck: No palpable thyroid nodules.  CVS: S1S2, No murmurs  Respiratory: No wheezing, no crepitations  GI: Abdomen soft, bowel sounds positive  Musculoskeletal:  edema lower extremities.     Diagnostics

## 2022-07-02 NOTE — PROGRESS NOTE ADULT - SUBJECTIVE AND OBJECTIVE BOX
[facility]    Kasia Cook, Bart, Blas, Kay, Mervin, Juvenal      [phone]    CHIEF COMPLAINT: Patient is a 88y old  Female who presents with a chief complaint of syncope/ ?  seizures (01 Jul 2022 14:17)      Follow Up: syncope lbbb    Interim history: The patient reports no new symptoms.  Denies chest discomfort and shortness of breath.  No abdominal pain.  No new neurologic symptoms.      MEDICATIONS  (STANDING):  allopurinol 100 milliGRAM(s) Oral daily  aspirin enteric coated 81 milliGRAM(s) Oral daily  atorvastatin 40 milliGRAM(s) Oral at bedtime  buPROPion XL (24-Hour) . 150 milliGRAM(s) Oral daily  cephalexin 500 milliGRAM(s) Oral every 12 hours  dextrose 5%. 1000 milliLiter(s) (50 mL/Hr) IV Continuous <Continuous>  dextrose 5%. 1000 milliLiter(s) (100 mL/Hr) IV Continuous <Continuous>  dextrose 50% Injectable 25 Gram(s) IV Push once  dextrose 50% Injectable 12.5 Gram(s) IV Push once  dextrose 50% Injectable 25 Gram(s) IV Push once  escitalopram 10 milliGRAM(s) Oral daily  glucagon  Injectable 1 milliGRAM(s) IntraMuscular once  heparin   Injectable 5000 Unit(s) SubCutaneous every 12 hours  insulin glargine Injectable (LANTUS) 8 Unit(s) SubCutaneous at bedtime  insulin lispro (ADMELOG) corrective regimen sliding scale   SubCutaneous three times a day before meals  insulin lispro (ADMELOG) corrective regimen sliding scale   SubCutaneous at bedtime  insulin lispro Injectable (ADMELOG) 6 Unit(s) SubCutaneous three times a day before meals  lisinopril 20 milliGRAM(s) Oral daily  metoprolol succinate ER 75 milliGRAM(s) Oral daily  pantoprazole    Tablet 40 milliGRAM(s) Oral before breakfast  sodium chloride 0.65% Nasal 1 Spray(s) Both Nostrils four times a day    MEDICATIONS  (PRN):  dextrose Oral Gel 15 Gram(s) Oral once PRN Blood Glucose LESS THAN 70 milliGRAM(s)/deciliter      REVIEW OF SYSTEMS:  eye, ent, GI, , allergic, dermatologic, musculoskeletal and neurologic are negative except as described above    Vital Signs Last 24 Hrs  T(C): 36.4 (02 Jul 2022 04:13), Max: 36.5 (01 Jul 2022 11:15)  T(F): 97.6 (02 Jul 2022 04:13), Max: 97.7 (01 Jul 2022 11:15)  HR: 70 (02 Jul 2022 06:15) (65 - 76)  BP: 162/74 (02 Jul 2022 06:15) (147/64 - 177/80)  BP(mean): --  RR: 18 (02 Jul 2022 04:13) (18 - 18)  SpO2: 98% (02 Jul 2022 04:13) (94% - 98%)    I&O's Summary    01 Jul 2022 07:01  -  02 Jul 2022 07:00  --------------------------------------------------------  IN: 840 mL / OUT: 0 mL / NET: 840 mL        Telemetry past 24h: sr    PHYSICAL EXAM:    Constitutional: well-nourished, well-developed, NAD   HEENT:  MMM, sclerae anicteric, conjunctivae clear, no oral cyanosis. nasal packing  Pulmonary: Non-labored, breath sounds are clear bilaterally, No wheezing, rales or rhonchi  Cardiovascular: Regular, S1 and S2.  No murmur.  No rubs, gallops or clicks  Gastrointestinal: Bowel Sounds present, soft, nontender.   Lymph: No peripheral edema.   Neurological: Alert, no focal deficits  Skin: No rashes.  Psych:  Mood & affect appropriate    LABS: All Labs Reviewed:                        9.7    6.52  )-----------( 166      ( 01 Jul 2022 07:19 )             30.6                         9.4    6.81  )-----------( 146      ( 30 Jun 2022 06:39 )             30.2                         9.8    7.18  )-----------( 154      ( 30 Jun 2022 01:03 )             31.6                 Blood Culture: Organism --  Gram Stain Blood -- Gram Stain --  Specimen Source Clean Catch Clean Catch (Midstream)  Culture-Blood --            RADIOLOGY:    EKG:    Echo:

## 2022-07-02 NOTE — PROGRESS NOTE ADULT - SUBJECTIVE AND OBJECTIVE BOX
EPS     PROGRESS  NOTE   ________________________________________________    CHIEF COMPLAINT:Patient is a 88y old  Female who presents with a chief complaint of syncope/ ?  seizures (02 Jul 2022 07:59)  no complain.  	  REVIEW OF SYSTEMS:  CONSTITUTIONAL: No fever, weight loss, or fatigue  EYES: No eye pain, visual disturbances, or discharge  ENT:  No difficulty hearing, tinnitus, vertigo; No sinus or throat pain  NECK: No pain or stiffness  RESPIRATORY: No cough, wheezing, chills or hemoptysis; No Shortness of Breath  CARDIOVASCULAR: No chest pain, palpitations, passing out, dizziness, or leg swelling  GASTROINTESTINAL: No abdominal or epigastric pain. No nausea, vomiting, or hematemesis; No diarrhea or constipation. No melena or hematochezia.  GENITOURINARY: No dysuria, frequency, hematuria, or incontinence  NEUROLOGICAL: No headaches, memory loss, loss of strength, numbness, or tremors  SKIN: No itching, burning, rashes, or lesions   LYMPH Nodes: No enlarged glands  ENDOCRINE: No heat or cold intolerance; No hair loss  MUSCULOSKELETAL: No joint pain or swelling; No muscle, back, or extremity pain  PSYCHIATRIC: No depression, anxiety, mood swings, or difficulty sleeping  HEME/LYMPH: No easy bruising, or bleeding gums  ALLERGY AND IMMUNOLOGIC: No hives or eczema	    [ ] All others negative	  [ ] Unable to obtain    PHYSICAL EXAM:  T(C): 36.4 (07-02-22 @ 04:13), Max: 36.5 (07-01-22 @ 11:15)  HR: 70 (07-02-22 @ 06:15) (65 - 76)  BP: 162/74 (07-02-22 @ 06:15) (147/64 - 177/80)  RR: 18 (07-02-22 @ 04:13) (18 - 18)  SpO2: 98% (07-02-22 @ 04:13) (94% - 98%)  Wt(kg): --  I&O's Summary    01 Jul 2022 07:01  -  02 Jul 2022 07:00  --------------------------------------------------------  IN: 840 mL / OUT: 0 mL / NET: 840 mL        Appearance: Normal	  HEENT:   Normal oral mucosa, PERRL, EOMI	  Lymphatic: No lymphadenopathy  Cardiovascular: Normal S1 S2, No JVD, + murmurs, No edema  Respiratory: Lungs clear to auscultation	  Psychiatry: A & O x 3, Mood & affect appropriate  Gastrointestinal:  Soft, Non-tender, + BS	  Skin: No rashes, No ecchymoses, No cyanosis	  Neurologic: Non-focal  Extremities: Normal range of motion, No clubbing, cyanosis or edema  Vascular: Peripheral pulses palpable 2+ bilaterally    MEDICATIONS  (STANDING):  allopurinol 100 milliGRAM(s) Oral daily  aspirin enteric coated 81 milliGRAM(s) Oral daily  atorvastatin 40 milliGRAM(s) Oral at bedtime  buPROPion XL (24-Hour) . 150 milliGRAM(s) Oral daily  cephalexin 500 milliGRAM(s) Oral every 12 hours  dextrose 5%. 1000 milliLiter(s) (50 mL/Hr) IV Continuous <Continuous>  dextrose 5%. 1000 milliLiter(s) (100 mL/Hr) IV Continuous <Continuous>  dextrose 50% Injectable 25 Gram(s) IV Push once  dextrose 50% Injectable 12.5 Gram(s) IV Push once  dextrose 50% Injectable 25 Gram(s) IV Push once  escitalopram 10 milliGRAM(s) Oral daily  glucagon  Injectable 1 milliGRAM(s) IntraMuscular once  heparin   Injectable 5000 Unit(s) SubCutaneous every 12 hours  insulin glargine Injectable (LANTUS) 8 Unit(s) SubCutaneous at bedtime  insulin lispro (ADMELOG) corrective regimen sliding scale   SubCutaneous three times a day before meals  insulin lispro (ADMELOG) corrective regimen sliding scale   SubCutaneous at bedtime  insulin lispro Injectable (ADMELOG) 6 Unit(s) SubCutaneous three times a day before meals  lisinopril 40 milliGRAM(s) Oral daily  metoprolol succinate ER 75 milliGRAM(s) Oral daily  pantoprazole    Tablet 40 milliGRAM(s) Oral before breakfast  sodium chloride 0.65% Nasal 1 Spray(s) Both Nostrils four times a day      TELEMETRY: 	    ECG:  	  RADIOLOGY:  OTHER: 	  	  LABS:	 	    CARDIAC MARKERS:                                9.7    6.52  )-----------( 166      ( 01 Jul 2022 07:19 )             30.6           proBNP:   Lipid Profile:   HgA1c:   TSH: Thyroid Stimulating Hormone, Serum: 2.47 uIU/mL (06-28 @ 07:19)          Assessment and plan  ---------------------------  88 year old RH female with a past medical history of HTN, HLD, DM, CAD with stents who is presenting as a transfer from [facility] for evaluation of possible seizure episode.    At baseline, the patient is ambulatory with a cane when she goes outside, lives alone and performs her own ADLs. The patient had an episode of nose bleeding for which she first presented to Urgent Care and then went to [facility] ED where her bleeding spontaneously stopped and she was discharged home. She then went home and was sitting down and eating a pastry when she told her daughter who was present that the patient felt like she was going to faint. The patient stated she had symptoms of her vision darkening, a symptom of epigastric discomfort and nausea, sweating and then had lost consciousness. As per the daughter, the patient began to lean back, her eyes rolled back, and to avoid falling backwards and vomiting was pushed forward by the daughter. The episode lasted several seconds and then the patient regained consciousness. EMS was called.    The patient then seemed slightly disoriented  which lasted several minutes and had completely resolved by the time that EMS had arrived. It was noted that there was some liquid by the patient which was concerning for urinary incontinence however it unclear as the patient stated that her undergarments and dress were dry. The patient was then brought to the ED at [facility] and was to undergo cardiac workup and was transferred to [facility] as there was no EEG monitoring. Currently, the patient states that she feels well and denied headaches, visual changes, auditory changes, numbness, tingling, weakness.  Patient notes that in the past she had a fall several months ago while she was ambulating and thinks that she may have lost consciousness during that time.  Patient stated that in the past when she was 10 years old, she was diagnosed with seizures. These episodes were marked by not being able to speak for a brief period of time and disorientation. Stated that she feels that she might remember these episodes but was unsure. She denied taking medications for these events and noted that when she underwent menarche, these episodes stopped and she has not had any since.  Is taking Wellbutrin and has been taking the same dose for 16 years as per daughter.   pt with sig cardiac hx s/p multiple stents ?new LBBB  tele nsr  echo  may need ischemia goldstein if not done recently  will adjust cardiac meds  severe orthostatic hypotension, keep sbp >140 at rest sitting  clive stocking  am cortisol lervel  echo noted with septal hypertrophy but no lvot gradient  orthostatisms is improved, iv hydration  no calcium channel blocker sec to orthostatic hypotension  ischemia goldstein as per card  increase beta blocker to 100 mg daily  check orthostatic today  stress test

## 2022-07-03 LAB
GLUCOSE BLDC GLUCOMTR-MCNC: 155 MG/DL — HIGH (ref 70–99)
GLUCOSE BLDC GLUCOMTR-MCNC: 163 MG/DL — HIGH (ref 70–99)
GLUCOSE BLDC GLUCOMTR-MCNC: 214 MG/DL — HIGH (ref 70–99)
GLUCOSE BLDC GLUCOMTR-MCNC: 224 MG/DL — HIGH (ref 70–99)

## 2022-07-03 PROCEDURE — 99233 SBSQ HOSP IP/OBS HIGH 50: CPT

## 2022-07-03 PROCEDURE — 93018 CV STRESS TEST I&R ONLY: CPT

## 2022-07-03 PROCEDURE — 93016 CV STRESS TEST SUPVJ ONLY: CPT

## 2022-07-03 PROCEDURE — 78452 HT MUSCLE IMAGE SPECT MULT: CPT | Mod: 26

## 2022-07-03 RX ORDER — INSULIN LISPRO 100/ML
7 VIAL (ML) SUBCUTANEOUS
Refills: 0 | Status: DISCONTINUED | OUTPATIENT
Start: 2022-07-03 | End: 2022-07-06

## 2022-07-03 RX ADMIN — ESCITALOPRAM OXALATE 10 MILLIGRAM(S): 10 TABLET, FILM COATED ORAL at 11:28

## 2022-07-03 RX ADMIN — PANTOPRAZOLE SODIUM 40 MILLIGRAM(S): 20 TABLET, DELAYED RELEASE ORAL at 05:07

## 2022-07-03 RX ADMIN — ATORVASTATIN CALCIUM 40 MILLIGRAM(S): 80 TABLET, FILM COATED ORAL at 22:17

## 2022-07-03 RX ADMIN — Medication 1 SPRAY(S): at 17:33

## 2022-07-03 RX ADMIN — LISINOPRIL 40 MILLIGRAM(S): 2.5 TABLET ORAL at 05:08

## 2022-07-03 RX ADMIN — Medication 1 SPRAY(S): at 22:00

## 2022-07-03 RX ADMIN — Medication 1: at 17:33

## 2022-07-03 RX ADMIN — Medication 81 MILLIGRAM(S): at 11:27

## 2022-07-03 RX ADMIN — Medication 1 SPRAY(S): at 00:10

## 2022-07-03 RX ADMIN — Medication 500 MILLIGRAM(S): at 17:33

## 2022-07-03 RX ADMIN — BUPROPION HYDROCHLORIDE 150 MILLIGRAM(S): 150 TABLET, EXTENDED RELEASE ORAL at 11:28

## 2022-07-03 RX ADMIN — HEPARIN SODIUM 5000 UNIT(S): 5000 INJECTION INTRAVENOUS; SUBCUTANEOUS at 17:32

## 2022-07-03 RX ADMIN — Medication 6 UNIT(S): at 13:58

## 2022-07-03 RX ADMIN — Medication 2: at 13:57

## 2022-07-03 RX ADMIN — INSULIN GLARGINE 8 UNIT(S): 100 INJECTION, SOLUTION SUBCUTANEOUS at 22:13

## 2022-07-03 RX ADMIN — Medication 7 UNIT(S): at 17:33

## 2022-07-03 RX ADMIN — Medication 1 SPRAY(S): at 05:07

## 2022-07-03 RX ADMIN — Medication 100 MILLIGRAM(S): at 05:07

## 2022-07-03 RX ADMIN — SODIUM CHLORIDE 70 MILLILITER(S): 9 INJECTION INTRAMUSCULAR; INTRAVENOUS; SUBCUTANEOUS at 05:08

## 2022-07-03 RX ADMIN — HEPARIN SODIUM 5000 UNIT(S): 5000 INJECTION INTRAVENOUS; SUBCUTANEOUS at 05:07

## 2022-07-03 RX ADMIN — Medication 100 MILLIGRAM(S): at 11:27

## 2022-07-03 RX ADMIN — Medication 500 MILLIGRAM(S): at 05:07

## 2022-07-03 RX ADMIN — Medication 1 SPRAY(S): at 11:28

## 2022-07-03 RX ADMIN — Medication 1: at 10:27

## 2022-07-03 RX ADMIN — Medication 6 UNIT(S): at 10:27

## 2022-07-03 NOTE — PROGRESS NOTE ADULT - ASSESSMENT
87 yo female with PMHX DM, HTN, HLD, CAD present to ED s/p syncopal episode     - episode was felt to be most likely vagally mediated, with epistaxis earlier in the day  - that being said, patient with LBBB (of unclear chronicity) and reported CAD  - has also severely orthostatic, with drop in sbp of ~50mmHg, now seemingly improved  - would continue to watch on telemetry  - echocardiogram with hyperdynamic lv function  - noting orthostatic bps would encourage adequate hydration, compression stockings. would not add midodrine or florinef noting generally adequate upright bp documented so far, and baseline hypertension   - cont metoprolol and lisinopril, which have been uptitrated.   -remains hypertensive, but noting bp drop of ~30mmHg yesterday would not be more aggressive with bp meds at this time  - cont check orthostatics daily     -nuc stress pending for today  -given epistaxis might be challenging to safely intervene if she has an abnormal stress, but will cross that bridge when needed  - appears more likely that syncope was likely volume related, along with orthostasis.   - ep evaluation noted     - cont asa alone  - off plavix given epistaxis  - cont statin     - trend creatinine and electrolytes. Keep K>4, mg>2  - will follow with you

## 2022-07-03 NOTE — PROGRESS NOTE ADULT - SUBJECTIVE AND OBJECTIVE BOX
afberile  REVIEW OF SYSTEMS:  GEN: no fever,    no chills  RESP: no SOB,   no cough  CVS: no chest pain,   no palpitations  GI: no abdominal pain,   no nausea,   no vomiting,   no constipation,   no diarrhea  : no dysuria,   no frequency  NEURO: no headache,   no dizziness  PSYCH: no depression,   not anxious  Derm : no rash    MEDICATIONS  (STANDING):  allopurinol 100 milliGRAM(s) Oral daily  aspirin enteric coated 81 milliGRAM(s) Oral daily  atorvastatin 40 milliGRAM(s) Oral at bedtime  buPROPion XL (24-Hour) . 150 milliGRAM(s) Oral daily  cephalexin 500 milliGRAM(s) Oral every 12 hours  dextrose 5%. 1000 milliLiter(s) (50 mL/Hr) IV Continuous <Continuous>  dextrose 5%. 1000 milliLiter(s) (100 mL/Hr) IV Continuous <Continuous>  dextrose 50% Injectable 25 Gram(s) IV Push once  dextrose 50% Injectable 12.5 Gram(s) IV Push once  dextrose 50% Injectable 25 Gram(s) IV Push once  escitalopram 10 milliGRAM(s) Oral daily  glucagon  Injectable 1 milliGRAM(s) IntraMuscular once  heparin   Injectable 5000 Unit(s) SubCutaneous every 12 hours  insulin glargine Injectable (LANTUS) 8 Unit(s) SubCutaneous at bedtime  insulin lispro (ADMELOG) corrective regimen sliding scale   SubCutaneous three times a day before meals  insulin lispro (ADMELOG) corrective regimen sliding scale   SubCutaneous at bedtime  insulin lispro Injectable (ADMELOG) 6 Unit(s) SubCutaneous three times a day before meals  lisinopril 40 milliGRAM(s) Oral daily  metoprolol succinate  milliGRAM(s) Oral daily  pantoprazole    Tablet 40 milliGRAM(s) Oral before breakfast  sodium chloride 0.65% Nasal 1 Spray(s) Both Nostrils four times a day  sodium chloride 0.9%. 1000 milliLiter(s) (70 mL/Hr) IV Continuous <Continuous>    MEDICATIONS  (PRN):  dextrose Oral Gel 15 Gram(s) Oral once PRN Blood Glucose LESS THAN 70 milliGRAM(s)/deciliter      Vital Signs Last 24 Hrs  T(C): 36.3 (03 Jul 2022 04:56), Max: 36.4 (02 Jul 2022 11:08)  T(F): 97.4 (03 Jul 2022 04:56), Max: 97.6 (02 Jul 2022 11:08)  HR: 62 (03 Jul 2022 06:26) (62 - 70)  BP: 154/68 (03 Jul 2022 06:26) (145/68 - 169/72)  BP(mean): --  RR: 18 (03 Jul 2022 04:56) (18 - 18)  SpO2: 95% (03 Jul 2022 04:56) (95% - 97%)  CAPILLARY BLOOD GLUCOSE      POCT Blood Glucose.: 278 mg/dL (02 Jul 2022 21:26)  POCT Blood Glucose.: 147 mg/dL (02 Jul 2022 17:21)  POCT Blood Glucose.: 175 mg/dL (02 Jul 2022 12:26)  POCT Blood Glucose.: 201 mg/dL (02 Jul 2022 08:18)    I&O's Summary    02 Jul 2022 07:01  -  03 Jul 2022 07:00  --------------------------------------------------------  IN: 1080 mL / OUT: 0 mL / NET: 1080 mL        PHYSICAL EXAM:  HEAD:  Atraumatic, Normocephalic  NECK: Supple, No   JVD  CHEST/LUNG:   no     rales,     no,    rhonchi  HEART: Regular rate and rhythm;         murmur  ABDOMEN: Soft, Nontender, ;   EXTREMITIES:    no    edema  NEUROLOGY:  alert    LABS:                          Thyroid Stimulating Hormone, Serum: 2.47 uIU/mL (06-28 @ 07:19)          Consultant(s) Notes Reviewed:      Care Discussed with Consultants/Other Providers:

## 2022-07-03 NOTE — PROGRESS NOTE ADULT - ASSESSMENT
Assessment  DMT2: 88y Female with DM T2 with hyperglycemia, A1C 7.6%, was on oral meds/Janumet at home, now on low-dose basal bolus insulin, blood sugars acceptable range this AM and fluctuating/running high postprandially, no hypoglycemic episodes, eating meals, no acute events.  Syncope: AM cortisol WNL, workup in progress, monitored.  HTN: Controlled,  on antihypertensive medications.  HLD: On statin.      Jaspal Augustine MD  Cell: [phone]  Office: [phone]       Assessment  DMT2: 88y Female with DM T2 with hyperglycemia, A1C 7.6%, was on oral meds/Janumet at home, now on low-dose basal bolus insulin, blood sugars acceptable range this AM and fluctuating/running high  postprandially, no hypoglycemic episodes, eating meals, no acute events.  Syncope: AM cortisol WNL, workup in progress, monitored.  HTN: Controlled,  on antihypertensive medications.  HLD: On statin.      Jaspal Augustine MD  Cell: [phone]  Office: [phone]

## 2022-07-03 NOTE — PROGRESS NOTE ADULT - SUBJECTIVE AND OBJECTIVE BOX
Chief complaint  Patient is a 88y old  Female who presents with a chief complaint of syncope/ ?  seizures (03 Jul 2022 09:47)   Review of systems  Patient in bed, looks comfortable, no hypoglycemic episodes.    Labs and Fingersticks  CAPILLARY BLOOD GLUCOSE      POCT Blood Glucose.: 224 mg/dL (03 Jul 2022 13:04)  POCT Blood Glucose.: 163 mg/dL (03 Jul 2022 10:24)  POCT Blood Glucose.: 278 mg/dL (02 Jul 2022 21:26)  POCT Blood Glucose.: 147 mg/dL (02 Jul 2022 17:21)                        Medications  MEDICATIONS  (STANDING):  allopurinol 100 milliGRAM(s) Oral daily  aspirin enteric coated 81 milliGRAM(s) Oral daily  atorvastatin 40 milliGRAM(s) Oral at bedtime  buPROPion XL (24-Hour) . 150 milliGRAM(s) Oral daily  cephalexin 500 milliGRAM(s) Oral every 12 hours  dextrose 5%. 1000 milliLiter(s) (50 mL/Hr) IV Continuous <Continuous>  dextrose 5%. 1000 milliLiter(s) (100 mL/Hr) IV Continuous <Continuous>  dextrose 50% Injectable 25 Gram(s) IV Push once  dextrose 50% Injectable 12.5 Gram(s) IV Push once  dextrose 50% Injectable 25 Gram(s) IV Push once  escitalopram 10 milliGRAM(s) Oral daily  glucagon  Injectable 1 milliGRAM(s) IntraMuscular once  heparin   Injectable 5000 Unit(s) SubCutaneous every 12 hours  insulin glargine Injectable (LANTUS) 8 Unit(s) SubCutaneous at bedtime  insulin lispro (ADMELOG) corrective regimen sliding scale   SubCutaneous three times a day before meals  insulin lispro (ADMELOG) corrective regimen sliding scale   SubCutaneous at bedtime  insulin lispro Injectable (ADMELOG) 7 Unit(s) SubCutaneous three times a day before meals  lisinopril 40 milliGRAM(s) Oral daily  metoprolol succinate  milliGRAM(s) Oral daily  pantoprazole    Tablet 40 milliGRAM(s) Oral before breakfast  sodium chloride 0.65% Nasal 1 Spray(s) Both Nostrils four times a day      Physical Exam  General: Patient comfortable in bed  Vital Signs Last 12 Hrs  T(F): 97.4 (07-03-22 @ 12:56), Max: 97.4 (07-03-22 @ 04:56)  HR: 65 (07-03-22 @ 14:59) (62 - 70)  BP: 168/64 (07-03-22 @ 14:59) (154/68 - 169/72)  BP(mean): --  RR: 18 (07-03-22 @ 04:56) (18 - 18)  SpO2: 95% (07-03-22 @ 04:56) (95% - 95%)  Neck: No palpable thyroid nodules.  CVS: S1S2, No murmurs  Respiratory: No wheezing, no crepitations  GI: Abdomen soft, bowel sounds positive  Musculoskeletal:  edema lower extremities.   Skin: No skin rashes, no ecchymosis    Diagnostics    A1C with Estimated Average Glucose: Routine  Cancel Reason: Lab Operations Cancel (06-28 @ 13:18)  Free Thyroxine, Serum: AM Sched. Collection: 28-Jun-2022 06:00 (06-27 @ 14:03)  Thyroid Stimulating Hormone, Serum: AM Sched. Collection: 28-Jun-2022 06:00  Cancel Reason: Lab Operations Cancel (06-27 @ 14:03)  A1C with Estimated Average Glucose: Routine  Cancel Reason: Lab Operations Cancel (06-27 @ 14:03)             Chief complaint  Patient is a 88y old  Female who presents with a chief complaint of syncope/ ?  seizures (03 Jul 2022 09:47)   Review of systems  Patient in bed, looks comfortable, no hypoglycemic episodes.    Labs and Fingersticks  CAPILLARY BLOOD GLUCOSE      POCT Blood Glucose.: 224 mg/dL (03 Jul 2022 13:04)  POCT Blood Glucose.: 163 mg/dL (03 Jul 2022 10:24)  POCT Blood Glucose.: 278 mg/dL (02 Jul 2022 21:26)  POCT Blood Glucose.: 147 mg/dL (02 Jul 2022 17:21)    Medications  MEDICATIONS  (STANDING):  allopurinol 100 milliGRAM(s) Oral daily  aspirin enteric coated 81 milliGRAM(s) Oral daily  atorvastatin 40 milliGRAM(s) Oral at bedtime  buPROPion XL (24-Hour) . 150 milliGRAM(s) Oral daily  cephalexin 500 milliGRAM(s) Oral every 12 hours  dextrose 5%. 1000 milliLiter(s) (50 mL/Hr) IV Continuous <Continuous>  dextrose 5%. 1000 milliLiter(s) (100 mL/Hr) IV Continuous <Continuous>  dextrose 50% Injectable 25 Gram(s) IV Push once  dextrose 50% Injectable 12.5 Gram(s) IV Push once  dextrose 50% Injectable 25 Gram(s) IV Push once  escitalopram 10 milliGRAM(s) Oral daily  glucagon  Injectable 1 milliGRAM(s) IntraMuscular once  heparin   Injectable 5000 Unit(s) SubCutaneous every 12 hours  insulin glargine Injectable (LANTUS) 8 Unit(s) SubCutaneous at bedtime  insulin lispro (ADMELOG) corrective regimen sliding scale   SubCutaneous three times a day before meals  insulin lispro (ADMELOG) corrective regimen sliding scale   SubCutaneous at bedtime  insulin lispro Injectable (ADMELOG) 7 Unit(s) SubCutaneous three times a day before meals  lisinopril 40 milliGRAM(s) Oral daily  metoprolol succinate  milliGRAM(s) Oral daily  pantoprazole    Tablet 40 milliGRAM(s) Oral before breakfast  sodium chloride 0.65% Nasal 1 Spray(s) Both Nostrils four times a day      Physical Exam  General: Patient comfortable in bed  Vital Signs Last 12 Hrs  T(F): 97.4 (07-03-22 @ 12:56), Max: 97.4 (07-03-22 @ 04:56)  HR: 65 (07-03-22 @ 14:59) (62 - 70)  BP: 168/64 (07-03-22 @ 14:59) (154/68 - 169/72)  BP(mean): --  RR: 18 (07-03-22 @ 04:56) (18 - 18)  SpO2: 95% (07-03-22 @ 04:56) (95% - 95%)  Neck: No palpable thyroid nodules.  CVS: S1S2, No murmurs  Respiratory: No wheezing, no crepitations  GI: Abdomen soft, bowel sounds positive  Musculoskeletal:  edema lower extremities.   Skin: No skin rashes, no ecchymosis    Diagnostics    A1C with Estimated Average Glucose: Routine  Cancel Reason: Lab Operations Cancel (06-28 @ 13:18)  Free Thyroxine, Serum: AM Sched. Collection: 28-Jun-2022 06:00 (06-27 @ 14:03)  Thyroid Stimulating Hormone, Serum: AM Sched. Collection: 28-Jun-2022 06:00  Cancel Reason: Lab Operations Cancel (06-27 @ 14:03)  A1C with Estimated Average Glucose: Routine  Cancel Reason: Lab Operations Cancel (06-27 @ 14:03)

## 2022-07-03 NOTE — PROGRESS NOTE ADULT - SUBJECTIVE AND OBJECTIVE BOX
[facility]    Kasia Cook, Bart, Blas, Kay, Mervin, Juvenal      [phone]    CHIEF COMPLAINT: Patient is a 88y old  Female who presents with a chief complaint of syncope/ ?  seizures (02 Jul 2022 15:33)      Follow Up: lbbb, syncope, epistaxis    Interim history: The patient reports no new symptoms.  Denies chest discomfort and shortness of breath.  No abdominal pain.  No new neurologic symptoms.      MEDICATIONS  (STANDING):  allopurinol 100 milliGRAM(s) Oral daily  aspirin enteric coated 81 milliGRAM(s) Oral daily  atorvastatin 40 milliGRAM(s) Oral at bedtime  buPROPion XL (24-Hour) . 150 milliGRAM(s) Oral daily  cephalexin 500 milliGRAM(s) Oral every 12 hours  dextrose 5%. 1000 milliLiter(s) (50 mL/Hr) IV Continuous <Continuous>  dextrose 5%. 1000 milliLiter(s) (100 mL/Hr) IV Continuous <Continuous>  dextrose 50% Injectable 25 Gram(s) IV Push once  dextrose 50% Injectable 12.5 Gram(s) IV Push once  dextrose 50% Injectable 25 Gram(s) IV Push once  escitalopram 10 milliGRAM(s) Oral daily  glucagon  Injectable 1 milliGRAM(s) IntraMuscular once  heparin   Injectable 5000 Unit(s) SubCutaneous every 12 hours  insulin glargine Injectable (LANTUS) 8 Unit(s) SubCutaneous at bedtime  insulin lispro (ADMELOG) corrective regimen sliding scale   SubCutaneous three times a day before meals  insulin lispro (ADMELOG) corrective regimen sliding scale   SubCutaneous at bedtime  insulin lispro Injectable (ADMELOG) 6 Unit(s) SubCutaneous three times a day before meals  lisinopril 40 milliGRAM(s) Oral daily  metoprolol succinate  milliGRAM(s) Oral daily  pantoprazole    Tablet 40 milliGRAM(s) Oral before breakfast  sodium chloride 0.65% Nasal 1 Spray(s) Both Nostrils four times a day  sodium chloride 0.9%. 1000 milliLiter(s) (70 mL/Hr) IV Continuous <Continuous>    MEDICATIONS  (PRN):  dextrose Oral Gel 15 Gram(s) Oral once PRN Blood Glucose LESS THAN 70 milliGRAM(s)/deciliter      REVIEW OF SYSTEMS:  eye, ent, GI, , allergic, dermatologic, musculoskeletal and neurologic are negative except as described above    Vital Signs Last 24 Hrs  T(C): 36.3 (03 Jul 2022 04:56), Max: 36.4 (02 Jul 2022 11:08)  T(F): 97.4 (03 Jul 2022 04:56), Max: 97.6 (02 Jul 2022 11:08)  HR: 62 (03 Jul 2022 06:26) (62 - 70)  BP: 154/68 (03 Jul 2022 06:26) (145/68 - 169/72)  BP(mean): --  RR: 18 (03 Jul 2022 04:56) (18 - 18)  SpO2: 95% (03 Jul 2022 04:56) (95% - 97%)    I&O's Summary    01 Jul 2022 07:01  -  02 Jul 2022 07:00  --------------------------------------------------------  IN: 840 mL / OUT: 0 mL / NET: 840 mL    02 Jul 2022 07:01  -  03 Jul 2022 06:52  --------------------------------------------------------  IN: 1080 mL / OUT: 0 mL / NET: 1080 mL        Telemetry past 24h: sr    PHYSICAL EXAM:    Constitutional: well-nourished, well-developed, NAD   HEENT:  MMM, sclerae anicteric, conjunctivae clear, no oral cyanosis.  Pulmonary: Non-labored, breath sounds are clear bilaterally, No wheezing, rales or rhonchi  Cardiovascular: Regular, S1 and S2.  soft sys murmur.  No rubs, gallops or clicks  Gastrointestinal: Bowel Sounds present, soft, nontender.   Lymph: No peripheral edema.   Neurological: Alert, no focal deficits  Skin: No rashes.  Psych:  Mood & affect appropriate    LABS: All Labs Reviewed:                        9.7    6.52  )-----------( 166      ( 01 Jul 2022 07:19 )             30.6                 Blood Culture: Organism --  Gram Stain Blood -- Gram Stain --  Specimen Source Clean Catch Clean Catch (Midstream)  Culture-Blood --            RADIOLOGY:    EKG:    Echo:

## 2022-07-03 NOTE — PROGRESS NOTE ADULT - SUBJECTIVE AND OBJECTIVE BOX
CARDIOLOGY     PROGRESS  NOTE   ________________________________________________    CHIEF COMPLAINT:Patient is a 88y old  Female who presents with a chief complaint of syncope/ ?  seizures (03 Jul 2022 08:12)  no complain.  	  REVIEW OF SYSTEMS:  CONSTITUTIONAL: No fever, weight loss, or fatigue  EYES: No eye pain, visual disturbances, or discharge  ENT:  No difficulty hearing, tinnitus, vertigo; No sinus or throat pain  NECK: No pain or stiffness  RESPIRATORY: No cough, wheezing, chills or hemoptysis; No Shortness of Breath  CARDIOVASCULAR: No chest pain, palpitations, passing out, dizziness, or leg swelling  GASTROINTESTINAL: No abdominal or epigastric pain. No nausea, vomiting, or hematemesis; No diarrhea or constipation. No melena or hematochezia.  GENITOURINARY: No dysuria, frequency, hematuria, or incontinence  NEUROLOGICAL: No headaches, memory loss, loss of strength, numbness, or tremors  SKIN: No itching, burning, rashes, or lesions   LYMPH Nodes: No enlarged glands  ENDOCRINE: No heat or cold intolerance; No hair loss  MUSCULOSKELETAL: No joint pain or swelling; No muscle, back, or extremity pain  PSYCHIATRIC: No depression, anxiety, mood swings, or difficulty sleeping  HEME/LYMPH: No easy bruising, or bleeding gums  ALLERGY AND IMMUNOLOGIC: No hives or eczema	    [ ] All others negative	  [ ] Unable to obtain    PHYSICAL EXAM:  T(C): 36.3 (07-03-22 @ 04:56), Max: 36.4 (07-02-22 @ 11:08)  HR: 62 (07-03-22 @ 06:26) (62 - 70)  BP: 154/68 (07-03-22 @ 06:26) (145/68 - 169/72)  RR: 18 (07-03-22 @ 04:56) (18 - 18)  SpO2: 95% (07-03-22 @ 04:56) (95% - 97%)  Wt(kg): --  I&O's Summary    02 Jul 2022 07:01  -  03 Jul 2022 07:00  --------------------------------------------------------  IN: 1080 mL / OUT: 0 mL / NET: 1080 mL        Appearance: Normal	  HEENT:   Normal oral mucosa, PERRL, EOMI	  Lymphatic: No lymphadenopathy  Cardiovascular: Normal S1 S2, No JVD, + murmurs, No edema  Respiratory: Lungs clear to auscultation	  Psychiatry: A & O x 3, Mood & affect appropriate  Gastrointestinal:  Soft, Non-tender, + BS	  Skin: No rashes, No ecchymoses, No cyanosis	  Neurologic: Non-focal  Extremities: Normal range of motion, No clubbing, cyanosis or edema  Vascular: Peripheral pulses palpable 2+ bilaterally    MEDICATIONS  (STANDING):  allopurinol 100 milliGRAM(s) Oral daily  aspirin enteric coated 81 milliGRAM(s) Oral daily  atorvastatin 40 milliGRAM(s) Oral at bedtime  buPROPion XL (24-Hour) . 150 milliGRAM(s) Oral daily  cephalexin 500 milliGRAM(s) Oral every 12 hours  dextrose 5%. 1000 milliLiter(s) (50 mL/Hr) IV Continuous <Continuous>  dextrose 5%. 1000 milliLiter(s) (100 mL/Hr) IV Continuous <Continuous>  dextrose 50% Injectable 25 Gram(s) IV Push once  dextrose 50% Injectable 12.5 Gram(s) IV Push once  dextrose 50% Injectable 25 Gram(s) IV Push once  escitalopram 10 milliGRAM(s) Oral daily  glucagon  Injectable 1 milliGRAM(s) IntraMuscular once  heparin   Injectable 5000 Unit(s) SubCutaneous every 12 hours  insulin glargine Injectable (LANTUS) 8 Unit(s) SubCutaneous at bedtime  insulin lispro (ADMELOG) corrective regimen sliding scale   SubCutaneous three times a day before meals  insulin lispro (ADMELOG) corrective regimen sliding scale   SubCutaneous at bedtime  insulin lispro Injectable (ADMELOG) 6 Unit(s) SubCutaneous three times a day before meals  lisinopril 40 milliGRAM(s) Oral daily  metoprolol succinate  milliGRAM(s) Oral daily  pantoprazole    Tablet 40 milliGRAM(s) Oral before breakfast  sodium chloride 0.65% Nasal 1 Spray(s) Both Nostrils four times a day  sodium chloride 0.9%. 1000 milliLiter(s) (70 mL/Hr) IV Continuous <Continuous>      TELEMETRY: 	    ECG:  	  RADIOLOGY:  OTHER: 	  	  LABS:	 	    CARDIAC MARKERS:                  proBNP:   Lipid Profile:   HgA1c:   TSH: Thyroid Stimulating Hormone, Serum: 2.47 uIU/mL (06-28 @ 07:19)    Cortisol AM, Serum: 6.7 ug/dL (06.29.22 @ 07:30)          Assessment and plan  ---------------------------  88 year old RH female with a past medical history of HTN, HLD, DM, CAD with stents who is presenting as a transfer from [place] for evaluation of possible seizure episode.    At baseline, the patient is ambulatory with a cane when she goes outside, lives alone and performs her own ADLs. The patient had an episode of nose bleeding for which she first presented to Urgent Care and then went to [facility] ED where her bleeding spontaneously stopped and she was discharged home. She then went home and was sitting down and eating a pastry when she told her daughter who was present that the patient felt like she was going to faint. The patient stated she had symptoms of her vision darkening, a symptom of epigastric discomfort and nausea, sweating and then had lost consciousness. As per the daughter, the patient began to lean back, her eyes rolled back, and to avoid falling backwards and vomiting was pushed forward by the daughter. The episode lasted several seconds and then the patient regained consciousness. EMS was called.    The patient then seemed slightly disoriented  which lasted several minutes and had completely resolved by the time that EMS had arrived. It was noted that there was some liquid by the patient which was concerning for urinary incontinence however it unclear as the patient stated that her undergarments and dress were dry. The patient was then brought to the ED at [place] and was to undergo cardiac workup and was transferred to [facility] as there was no EEG monitoring. Currently, the patient states that she feels well and denied headaches, visual changes, auditory changes, numbness, tingling, weakness.  Patient notes that in the past she had a fall several months ago while she was ambulating and thinks that she may have lost consciousness during that time.  Patient stated that in the past when she was 10 years old, she was diagnosed with seizures. These episodes were marked by not being able to speak for a brief period of time and disorientation. Stated that she feels that she might remember these episodes but was unsure. She denied taking medications for these events and noted that when she underwent menarche, these episodes stopped and she has not had any since.  Is taking Wellbutrin and has been taking the same dose for 16 years as per daughter.   pt with sig cardiac hx s/p multiple stents ?new LBBB  tele nsr  echo  may need ischemia goldstein if not done recently  will adjust cardiac meds  severe orthostatic hypotension, keep sbp >140 at rest sitting  clive stocking  am cortisol lervel  echo noted with septal hypertrophy but no lvot gradient  no calcium channel blocker sec to orthostatic hypotension  ischemia goldstein as per card  increase beta blocker to 100 mg daily  stress test ?today  orthostatic noted as long as pt asymptomatic, no concern  repeat am cortisol level

## 2022-07-03 NOTE — PROGRESS NOTE ADULT - ASSESSMENT
88 year old RH female      h/o HTN, HLD, DM, CAD with stents     who is presenting as a transfer from [place] for evaluation of possible seizure episode./  and  s/p  syncope   LOC episode with prodrome of darkening of vision, sweating, nausea with history of  cad, need  to  r/o  cardiac  causes   per  neuro,  no  seizures meds    tele.  r/o  arrythmia   echo/  card  dr hudson jane/ melissa   HYN/ HLD  on  asa.  lisinoril,  toprol   CAD/  stents, on asa./ plavix/  lipitor   DM,  follow  fs, on  janumet   depression, on bupropion,  lexapro  on  dvt  ppx   +  orthostatic, was  on iv fluids    echo, normal  ef/ card   hudson jane/  ep dr hudson jane   anemia,  guaiac  +/   ?  from her epistaxis ,    hb stable    s/p epistaxis   again/ had  nasal packing  by  ent/  eeg. no  seizures  noted, hb is 9  awaiting  stress  test  pe r card

## 2022-07-03 NOTE — CHART NOTE - NSCHARTNOTEFT_GEN_A_CORE
MEDICINE NP    ANIKA CHISHOLM  88y Female    Patient is a 88y old  Female who presents with a chief complaint of syncope/ ?  seizures (03 Jul 2022 15:19)         > Event Summary:   Notified by RN, Patient with 196/69 - 181/68, asymptomatic.  Patient seen at bedside, asleep, but easily awakened; Denies chest pain, sob, palpitations, sob, dizziness, or pain.  -Orthostatic Vitals obtained w/ Positive results.  /68 to 147/65   -Will hold off BP control and monitor as patient asymptomatic  -C/w Telemetry   -D/w Attending, and agrees with plan, no further management     -Vital Signs Last 24 Hrs  T(C): 36.4 (03 Jul 2022 22:26), Max: 36.5 (03 Jul 2022 20:25)  T(F): 97.5 (03 Jul 2022 22:26), Max: 97.7 (03 Jul 2022 20:25)  HR: 63 (03 Jul 2022 23:22) (62 - 75)  BP: 186/68 (03 Jul 2022 23:22) (154/68 - 196/69)  RR: 18 (03 Jul 2022 23:22) (18 - 18)  SpO2: 96% (03 Jul 2022 23:22) (95% - 96%)      SANDEEP [id number]  Medicine Department  #[id number]

## 2022-07-04 LAB
GLUCOSE BLDC GLUCOMTR-MCNC: 152 MG/DL — HIGH (ref 70–99)
GLUCOSE BLDC GLUCOMTR-MCNC: 177 MG/DL — HIGH (ref 70–99)
GLUCOSE BLDC GLUCOMTR-MCNC: 196 MG/DL — HIGH (ref 70–99)
GLUCOSE BLDC GLUCOMTR-MCNC: 239 MG/DL — HIGH (ref 70–99)
SARS-COV-2 RNA SPEC QL NAA+PROBE: SIGNIFICANT CHANGE UP

## 2022-07-04 PROCEDURE — 99233 SBSQ HOSP IP/OBS HIGH 50: CPT

## 2022-07-04 RX ORDER — AMLODIPINE BESYLATE 2.5 MG/1
5 TABLET ORAL DAILY
Refills: 0 | Status: DISCONTINUED | OUTPATIENT
Start: 2022-07-04 | End: 2022-07-06

## 2022-07-04 RX ADMIN — ESCITALOPRAM OXALATE 10 MILLIGRAM(S): 10 TABLET, FILM COATED ORAL at 11:37

## 2022-07-04 RX ADMIN — INSULIN GLARGINE 8 UNIT(S): 100 INJECTION, SOLUTION SUBCUTANEOUS at 22:12

## 2022-07-04 RX ADMIN — Medication 500 MILLIGRAM(S): at 05:31

## 2022-07-04 RX ADMIN — Medication 7 UNIT(S): at 17:32

## 2022-07-04 RX ADMIN — AMLODIPINE BESYLATE 5 MILLIGRAM(S): 2.5 TABLET ORAL at 05:30

## 2022-07-04 RX ADMIN — Medication 500 MILLIGRAM(S): at 17:32

## 2022-07-04 RX ADMIN — HEPARIN SODIUM 5000 UNIT(S): 5000 INJECTION INTRAVENOUS; SUBCUTANEOUS at 17:32

## 2022-07-04 RX ADMIN — HEPARIN SODIUM 5000 UNIT(S): 5000 INJECTION INTRAVENOUS; SUBCUTANEOUS at 05:31

## 2022-07-04 RX ADMIN — Medication 1: at 08:50

## 2022-07-04 RX ADMIN — Medication 100 MILLIGRAM(S): at 11:38

## 2022-07-04 RX ADMIN — Medication 81 MILLIGRAM(S): at 11:37

## 2022-07-04 RX ADMIN — PANTOPRAZOLE SODIUM 40 MILLIGRAM(S): 20 TABLET, DELAYED RELEASE ORAL at 05:32

## 2022-07-04 RX ADMIN — BUPROPION HYDROCHLORIDE 150 MILLIGRAM(S): 150 TABLET, EXTENDED RELEASE ORAL at 11:37

## 2022-07-04 RX ADMIN — ATORVASTATIN CALCIUM 40 MILLIGRAM(S): 80 TABLET, FILM COATED ORAL at 22:12

## 2022-07-04 RX ADMIN — Medication 1: at 13:04

## 2022-07-04 RX ADMIN — Medication 1: at 17:31

## 2022-07-04 RX ADMIN — Medication 1 SPRAY(S): at 05:44

## 2022-07-04 RX ADMIN — Medication 7 UNIT(S): at 08:50

## 2022-07-04 RX ADMIN — Medication 7 UNIT(S): at 13:04

## 2022-07-04 RX ADMIN — Medication 100 MILLIGRAM(S): at 05:30

## 2022-07-04 RX ADMIN — LISINOPRIL 40 MILLIGRAM(S): 2.5 TABLET ORAL at 05:30

## 2022-07-04 NOTE — PROGRESS NOTE ADULT - ASSESSMENT
87 yo female with PMHX DM, HTN, HLD, CAD present to ED s/p syncopal episode     -no acute ischemia  -known cad s/p pci in the past  -lbbb uncertain duration  -had not been overly concerned about ischemia but her nuclear stress test from yesterday is very abnormal with multiple defects in several vasc territories  -discussed cath at length with the patient and with her daughter marco antonio by phone  -will arrange cath tomorrow to define anatomy and need for further intervention  -given epistaxis might be challenging to safely intervene if she has an abnormal stress, but will cross that bridge when needed    -no vol ol    -no arrhythmias have been noted on tele  -syncopal episode was felt to be most likely vagally mediated, with epistaxis earlier in the day  - echocardiogram with hyperdynamic lv function  - noting orthostatic bps would encourage adequate hydration, compression stockings.    -bp had been low and she had been very orthostatic  -very hypertensive on metoprolol and lisinopril  -have resumed amlodipine     - cont asa alone  - off plavix given epistaxis  - cont statin     - trend creatinine and electrolytes. Keep K>4, mg>2  - will follow with you

## 2022-07-04 NOTE — PROGRESS NOTE ADULT - SUBJECTIVE AND OBJECTIVE BOX
Chief complaint    Patient is a 88y old  Female who presents with a chief complaint of syncope/ ?  seizures (04 Jul 2022 10:46)   Review of systems  Patient in bed, appears comfortable.    Labs and Fingersticks  CAPILLARY BLOOD GLUCOSE      POCT Blood Glucose.: 196 mg/dL (04 Jul 2022 08:46)  POCT Blood Glucose.: 214 mg/dL (03 Jul 2022 21:30)  POCT Blood Glucose.: 155 mg/dL (03 Jul 2022 17:06)  POCT Blood Glucose.: 224 mg/dL (03 Jul 2022 13:04)                        Medications  MEDICATIONS  (STANDING):  allopurinol 100 milliGRAM(s) Oral daily  amLODIPine   Tablet 5 milliGRAM(s) Oral daily  aspirin enteric coated 81 milliGRAM(s) Oral daily  atorvastatin 40 milliGRAM(s) Oral at bedtime  buPROPion XL (24-Hour) . 150 milliGRAM(s) Oral daily  cephalexin 500 milliGRAM(s) Oral every 12 hours  dextrose 5%. 1000 milliLiter(s) (100 mL/Hr) IV Continuous <Continuous>  dextrose 5%. 1000 milliLiter(s) (50 mL/Hr) IV Continuous <Continuous>  dextrose 50% Injectable 25 Gram(s) IV Push once  dextrose 50% Injectable 12.5 Gram(s) IV Push once  dextrose 50% Injectable 25 Gram(s) IV Push once  escitalopram 10 milliGRAM(s) Oral daily  glucagon  Injectable 1 milliGRAM(s) IntraMuscular once  heparin   Injectable 5000 Unit(s) SubCutaneous every 12 hours  insulin glargine Injectable (LANTUS) 8 Unit(s) SubCutaneous at bedtime  insulin lispro (ADMELOG) corrective regimen sliding scale   SubCutaneous three times a day before meals  insulin lispro (ADMELOG) corrective regimen sliding scale   SubCutaneous at bedtime  insulin lispro Injectable (ADMELOG) 7 Unit(s) SubCutaneous three times a day before meals  lisinopril 40 milliGRAM(s) Oral daily  metoprolol succinate  milliGRAM(s) Oral daily  pantoprazole    Tablet 40 milliGRAM(s) Oral before breakfast      Physical Exam  General: Patient comfortable in bed  Vital Signs Last 12 Hrs  T(F): 97.6 (07-04-22 @ 04:48), Max: 97.6 (07-04-22 @ 04:48)  HR: 72 (07-04-22 @ 04:48) (63 - 72)  BP: 187/72 (07-04-22 @ 04:48) (186/68 - 187/72)  BP(mean): --  RR: 18 (07-04-22 @ 04:48) (18 - 18)  SpO2: 95% (07-04-22 @ 04:48) (95% - 96%)  Neck: No palpable thyroid nodules.  CVS: S1S2, No murmurs  Respiratory: No wheezing, no crepitations  GI: Abdomen soft, bowel sounds positive  Musculoskeletal:  edema lower extremities.     Diagnostics

## 2022-07-04 NOTE — PROGRESS NOTE ADULT - SUBJECTIVE AND OBJECTIVE BOX
afberile    REVIEW OF SYSTEMS:  GEN: no fever,    no chills  RESP: no SOB,   no cough  CVS: no chest pain,   no palpitations  GI: no abdominal pain,   no nausea,   no vomiting,   no constipation,   no diarrhea  : no dysuria,   no frequency  NEURO: no headache,   no dizziness  PSYCH: no depression,   not anxious  Derm : no rash    MEDICATIONS  (STANDING):  allopurinol 100 milliGRAM(s) Oral daily  amLODIPine   Tablet 5 milliGRAM(s) Oral daily  aspirin enteric coated 81 milliGRAM(s) Oral daily  atorvastatin 40 milliGRAM(s) Oral at bedtime  buPROPion XL (24-Hour) . 150 milliGRAM(s) Oral daily  cephalexin 500 milliGRAM(s) Oral every 12 hours  dextrose 5%. 1000 milliLiter(s) (100 mL/Hr) IV Continuous <Continuous>  dextrose 5%. 1000 milliLiter(s) (50 mL/Hr) IV Continuous <Continuous>  dextrose 50% Injectable 25 Gram(s) IV Push once  dextrose 50% Injectable 12.5 Gram(s) IV Push once  dextrose 50% Injectable 25 Gram(s) IV Push once  escitalopram 10 milliGRAM(s) Oral daily  glucagon  Injectable 1 milliGRAM(s) IntraMuscular once  heparin   Injectable 5000 Unit(s) SubCutaneous every 12 hours  insulin glargine Injectable (LANTUS) 8 Unit(s) SubCutaneous at bedtime  insulin lispro (ADMELOG) corrective regimen sliding scale   SubCutaneous three times a day before meals  insulin lispro (ADMELOG) corrective regimen sliding scale   SubCutaneous at bedtime  insulin lispro Injectable (ADMELOG) 7 Unit(s) SubCutaneous three times a day before meals  lisinopril 40 milliGRAM(s) Oral daily  metoprolol succinate  milliGRAM(s) Oral daily  pantoprazole    Tablet 40 milliGRAM(s) Oral before breakfast    MEDICATIONS  (PRN):  dextrose Oral Gel 15 Gram(s) Oral once PRN Blood Glucose LESS THAN 70 milliGRAM(s)/deciliter      Vital Signs Last 24 Hrs  T(C): 36.4 (04 Jul 2022 04:48), Max: 36.5 (03 Jul 2022 20:25)  T(F): 97.6 (04 Jul 2022 04:48), Max: 97.7 (03 Jul 2022 20:25)  HR: 72 (04 Jul 2022 04:48) (63 - 75)  BP: 187/72 (04 Jul 2022 04:48) (168/64 - 196/69)  BP(mean): --  RR: 18 (04 Jul 2022 04:48) (18 - 18)  SpO2: 95% (04 Jul 2022 04:48) (95% - 96%)  CAPILLARY BLOOD GLUCOSE      POCT Blood Glucose.: 196 mg/dL (04 Jul 2022 08:46)  POCT Blood Glucose.: 214 mg/dL (03 Jul 2022 21:30)  POCT Blood Glucose.: 155 mg/dL (03 Jul 2022 17:06)  POCT Blood Glucose.: 224 mg/dL (03 Jul 2022 13:04)    I&O's Summary    03 Jul 2022 07:01  -  04 Jul 2022 07:00  --------------------------------------------------------  IN: 770 mL / OUT: 0 mL / NET: 770 mL    04 Jul 2022 07:01  -  04 Jul 2022 10:46  --------------------------------------------------------  IN: 120 mL / OUT: 0 mL / NET: 120 mL        PHYSICAL EXAM:  HEAD:  Atraumatic, Normocephalic  NECK: Supple, No   JVD  CHEST/LUNG:   no     rales,     no,    rhonchi  HEART: Regular rate and rhythm;         murmur  ABDOMEN: Soft, Nontender, ;   EXTREMITIES:   no     edema  NEUROLOGY:  alert    LABS:                          Thyroid Stimulating Hormone, Serum: 2.47 uIU/mL (06-28 @ 07:19)          Consultant(s) Notes Reviewed:      Care Discussed with Consultants/Other Providers:

## 2022-07-04 NOTE — PROGRESS NOTE ADULT - SUBJECTIVE AND OBJECTIVE BOX
[facility]    Kasia Cook, Bart, Blas, Kay, Mervin, Juvenal      [phone]    CHIEF COMPLAINT: Patient is a 88y old  Female who presents with a chief complaint of syncope/ ?  seizures (2022 15:19)      Follow Up: syncope, cad, lbbb    Interim history: The patient reports no new symptoms.  Denies chest discomfort and shortness of breath.  No abdominal pain.  No new neurologic symptoms. BP has been very high.  Stress test results noted.      MEDICATIONS  (STANDING):  allopurinol 100 milliGRAM(s) Oral daily  amLODIPine   Tablet 5 milliGRAM(s) Oral daily  aspirin enteric coated 81 milliGRAM(s) Oral daily  atorvastatin 40 milliGRAM(s) Oral at bedtime  buPROPion XL (24-Hour) . 150 milliGRAM(s) Oral daily  cephalexin 500 milliGRAM(s) Oral every 12 hours  dextrose 5%. 1000 milliLiter(s) (100 mL/Hr) IV Continuous <Continuous>  dextrose 5%. 1000 milliLiter(s) (50 mL/Hr) IV Continuous <Continuous>  dextrose 50% Injectable 25 Gram(s) IV Push once  dextrose 50% Injectable 12.5 Gram(s) IV Push once  dextrose 50% Injectable 25 Gram(s) IV Push once  escitalopram 10 milliGRAM(s) Oral daily  glucagon  Injectable 1 milliGRAM(s) IntraMuscular once  heparin   Injectable 5000 Unit(s) SubCutaneous every 12 hours  insulin glargine Injectable (LANTUS) 8 Unit(s) SubCutaneous at bedtime  insulin lispro (ADMELOG) corrective regimen sliding scale   SubCutaneous three times a day before meals  insulin lispro (ADMELOG) corrective regimen sliding scale   SubCutaneous at bedtime  insulin lispro Injectable (ADMELOG) 7 Unit(s) SubCutaneous three times a day before meals  lisinopril 40 milliGRAM(s) Oral daily  metoprolol succinate  milliGRAM(s) Oral daily  pantoprazole    Tablet 40 milliGRAM(s) Oral before breakfast  sodium chloride 0.65% Nasal 1 Spray(s) Both Nostrils four times a day    MEDICATIONS  (PRN):  dextrose Oral Gel 15 Gram(s) Oral once PRN Blood Glucose LESS THAN 70 milliGRAM(s)/deciliter      REVIEW OF SYSTEMS:  eye, ent, GI, , allergic, dermatologic, musculoskeletal and neurologic are negative except as described above    Vital Signs Last 24 Hrs  T(C): 36.4 (2022 04:48), Max: 36.5 (2022 20:25)  T(F): 97.6 (2022 04:48), Max: 97.7 (2022 20:25)  HR: 72 (2022 04:48) (63 - 75)  BP: 187/72 (2022 04:48) (168/64 - 196/69)  BP(mean): --  RR: 18 (2022 04:48) (18 - 18)  SpO2: 95% (2022 04:48) (95% - 96%)    I&O's Summary    2022 07:01  -  2022 07:00  --------------------------------------------------------  IN: 770 mL / OUT: 0 mL / NET: 770 mL        Telemetry past 24h: sbsr    PHYSICAL EXAM:    Constitutional: well-nourished, well-developed, NAD   HEENT:  MMM, sclerae anicteric, conjunctivae clear, no oral cyanosis.  Pulmonary: Non-labored, breath sounds are clear bilaterally, No wheezing, rales or rhonchi  Cardiovascular: Regular, S1 and S2.  No murmur.  No rubs, gallops or clicks  Gastrointestinal: Bowel Sounds present, soft, nontender.   Lymph: No peripheral edema.   Neurological: Alert, no focal deficits  Skin: No rashes.  Psych:  Mood & affect appropriate    LABS: All Labs Reviewed:                        9.7    6.52  )-----------( 166      ( 2022 07:19 )             30.6                 Blood Culture:         RADIOLOGY:  < from: Nuclear Stress Test-Pharmacologic (Nuclear Stress Test-Pharmacologic .) (22 @ 10:10) >    PATIENT: ANIKA CHISHOLM  : 1934   AGE: 88 (F)   MR#: [id number]  STUDY DATE: 2022  LOCATION: [facility]-  REF. PHYSICIAN(S): Goldie Ho MD  FELLOW: Harry Torres FNP-CTOMY  ------------------------------------------------------------------------  TYPE OF TEST: Rest/Stress Pharmacologic  INDICATION: Essential (primary) hypertension (I10),  Coronary angioplasty status (Z98.61)  ------------------------------------------------------------------------  HISTORY:  CARDIAC HISTORY: 88year old woman with HTN, HLD, DMT2,  CAD with stent presents for ischemic evaluation for  syncopal episode.  RISK FACTORS: Diabetes, Elevated Cholesterol,  Hypertension, Post Menopausal, Family History  MEDICATIONS: ASA, Allopurinol, Atorvastatin, Wellbutrin,  Lexapro, Insulin  ------------------------------------------------------------------------  BASELINE ELECTROCARDIOGRAM:  Rhythm: Sinus Bradycardia - 58 BPM  Conduction Defect: Left bundle branch block, Left axis  deviation  ------------------------------------------------------------------------  HEMODYNAMIC PARAMETERS:                                      HR      BP  Baseline  Pre-Injection             58  207/81  00:00     Inject Regadenoson         0  00:30     Post Injection    0  01:00     Post Injection            67  131/79  02:00     Post Injection            77  131/79  03:00     Post Injection            73  164/76  04:00     Post Injection            74  182/77  05:00     Recovery                  76  181/79  06:00     Recovery                  75  157/85  07:00     Recovery                  71  136/94  08:00     Recovery                  69  158/96  ------------------------------------------------------------------------  Agent: Regadenoson 0.4 mg/5 ml NS. intravenously injected  over 10 sec.  HR: Baseline HR: 58 bpm   Peak HR: 77 bpm (58% of MPHR)  MPHR: 132 bpm   85% of MPHR: 112 bpm  BP: Baseline BP: 207/81 mmHg   Peak BP: 207/81 mmHg   Peak  RPP: 81225 (Rate Pressure Product)  Last Caffeine intake: 12 hrs  Terminated: Completion of protocol  ------------------------------------------------------------------------  SYMPTOMS/FINDINGS:  Symptoms: Chest burning, flushing sensation  Chest pain: Patient complained of a burning sensation in  the chest starting at 1:00 min following regadenoson  infusion at 67 bpm and persisting approximately 1:00 min.  Treatment: None  ------------------------------------------------------------------------  ECG ABNORMALITIES DURING/AFTER STRESS:   Abnormalities: ECG changes could not be interpreted due  to bundle branch block.  ------------------------------------------------------------------------  NEW ARRHYTHMIAS DEVELOPED DURING/AFTER STRESS:  None  ------------------------------------------------------------------------  STRESS TEST IMPRESSIONS:  Chest Pain: Patient complained of a burning sensation in  the chest starting at 1:00 min following regadenoson  infusion at 67 bpm and persisting approximately 1:00 min.  Symptom: Chest burning, flushing sensation.  HR Response: Appropriate.  BP Response: Appropriate.  Heart Rhythm: Sinus Bradycardia - 58 BPM.  Conduction defects: Left bundle branch block, Left axis  deviation.  ECG Abnormalities: ECG changes could not be interpreted  due to bundle branch block.  Arrhythmia: None.  ------------------------------------------------------------------------  PROCEDURE:  5.98 mCi of Tc99m Sestamibi were injected intravenously at  rest. Approximately 65 minutes later, tomographic images  were obtained in a 180 degree arc from right anterior  oblique to left anterior oblique with 64 stops. At a  separate time, 19.58 mCi of Tc99m Sestamibi were injected  intravenously during stress protocol. Approximately 75  minute(s) later, tomographic images were obtained in a 180  degree arc from right anterior oblique to left anterior  oblique with 64 stops. The tomographic slices were  reconstructed in 3 orthogonal planes (short axis,  horizontal long axis and vertical long axis).  Interpretation was performed both by visual and  quantitative analysis.  Rest and stress images were  acquired using CZT-based system with pinhole collimation  ([employer] 530 c, [place]), and reconstructed  using MLEM algorithm. Images were re-acquired with the  patient dinesh prone position.  ------------------------------------------------------------------------  NUCLEAR FINDINGS:  The left ventricle was normal in size.  There are medium-sized, mild defects in the basal to mid  anterior and basal to mid anteroseptal walls that are  reversible suggestive of ischemia.  There is a small, mild defect in the inferoapical and  apical lateral walls that is reversible suggestive of  ischemia.  There is a small, mild defect in the distal anteroseptal  wall that is fixed suggestive of infarct.  There are medium-sized, mild to moderate defects in the  basal inferior and basal to mid inferoseptal walls that  are partially reversible suggetsive of infarct with mild  to moderate hermelinda-infarct ischemia, primarily in the  inferoseptum.  There are meidum-sized, mild to moderate defects in the  inferolateral wall that are mostly fixed suggestive of  infarct wit mild hermelinda-infarct ischemia.  ------------------------------------------------------------------------  GATED ANALYSIS:  Post-stress gated wall motion analysis was performed (LVEF  = 52 %;LVEDV = 85 ml.) revealing hypokinesis of the  inferolateral, basal inferior, basal to mid inferoseptal,  and distal anteroseptal walls.  There is paradoxical  septal motion consistent with bundle branch block.  ------------------------------------------------------------------------  IMPRESSIONS:Abnormal Study  * Chest Pain: Patient complained of a burning sensation in  the chest starting at 1:00 min following regadenoson  infusion at 67 bpm and persisting approximately 1:00 min.  * Symptom: Chest burning, flushing sensation.  * HR Response: Appropriate; BP Response: Appropriate.  * Heart Rhythm: Sinus Bradycardia - 58 BPM.  * Conduction defects: Left bundle branch block, Left axis  deviation.  * ECG Abnormalities: ECG changes could not be interpreted  due to bundle branch block.  * There are medium-sized, mild defects in the basal to mid  anterior and basal to mid anteroseptal walls that are  reversible suggestive of ischemia.  * There is a small, mild defect in the inferoapical and  apical lateral walls that is reversible suggestive of  ischemia.  * There is a small, mild defect in the distal anteroseptal  wall that is fixed suggestive of infarct.  * There are medium-sized, mild to moderate defects in the  basal inferior and basal to mid inferoseptal walls that  are partially reversible suggetsive of infarct with mild  to moderate hermelinda-infarct ischemia, primarily in the  inferoseptum.  * There are meidum-sized, mild to moderate defects in the  inferolateral wall that are mostly fixed suggestive of  infarct wit mild hermelinda-infarct ischemia.  * Post-stress gated wall motion analysis was performed  (LVEF = 52 %;LVEDV = 85 ml.) revealing hypokinesis of the  inferolateral, basal inferior, basal to mid inferoseptal,  and distal anteroseptal walls.  There is paradoxical  septal motion consistent with bundle branch block.  *** No previous Nuclear/Stress exam.  ------------------------------------------------------------------------  Confirmed on  7/3/2022 - 12:56:44 by Kendall De La Rosa M.D.  ------------------------------------------------------------------------    < end of copied text >    EKG:    Echo:

## 2022-07-04 NOTE — PROGRESS NOTE ADULT - ASSESSMENT
88 year old RH female      h/o HTN, HLD, DM, CAD with stents     who is presenting as a transfer from [place] for evaluation of possible seizure episode./  and  s/p  syncope   LOC episode with prodrome of darkening of vision, sweating, nausea with history of  cad, need  to  r/o  cardiac  causes   per  neuro,  no  seizures meds    tele.  r/o  arrythmia   echo/  card  dr hudson jane/ melissa   HYN/ HLD  on  asa.  lisinoril,  toprol   CAD/  stents, on asa./ plavix/  lipitor   DM,  follow  fs, on  janumet   depression, on bupropion,  lexapro  on  dvt  ppx   +  orthostatic, was  on iv fluids    echo, normal  ef/ card   hudson jane/  ep dr hudson jane   anemia,  guaiac  +/   ?  from her epistaxis ,    hb stable    s/p epistaxis  . reoved  nasal packing  by  ent/  eeg. no  seizures  noted,    stress  test  is +/, and  needs  cath   per card/  still orthostatic /  elevated sbp. meds  pe r card

## 2022-07-04 NOTE — PROGRESS NOTE ADULT - ASSESSMENT
Assessment  DMT2: 88y Female with DM T2 with hyperglycemia, A1C 7.6%, was on oral meds/Janumet at home, now on low-dose basal bolus insulin, dose adjusted,  blood sugars are trending down, no hypoglycemic episodes, eating meals, no acute events.  Syncope: AM cortisol WNL, workup in progress, monitored.  HTN: Controlled,  on antihypertensive medications.  HLD: On statin.      Jaspal Augustine MD  Cell: [phone]  Office: [phone]

## 2022-07-04 NOTE — PROGRESS NOTE ADULT - SUBJECTIVE AND OBJECTIVE BOX
EPS    PROGRESS  NOTE   ________________________________________________    CHIEF COMPLAINT:Patient is a 88y old  Female who presents with a chief complaint of syncope/ ?  seizures (04 Jul 2022 07:14)  no complain.  	  REVIEW OF SYSTEMS:  CONSTITUTIONAL: No fever, weight loss, or fatigue  EYES: No eye pain, visual disturbances, or discharge  ENT:  No difficulty hearing, tinnitus, vertigo; No sinus or throat pain  NECK: No pain or stiffness  RESPIRATORY: No cough, wheezing, chills or hemoptysis; + Shortness of Breath  CARDIOVASCULAR: No chest pain, palpitations, passing out, dizziness, or leg swelling  GASTROINTESTINAL: No abdominal or epigastric pain. No nausea, vomiting, or hematemesis; No diarrhea or constipation. No melena or hematochezia.  GENITOURINARY: No dysuria, frequency, hematuria, or incontinence  NEUROLOGICAL: No headaches, memory loss, loss of strength, numbness, or tremors  SKIN: No itching, burning, rashes, or lesions   LYMPH Nodes: No enlarged glands  ENDOCRINE: No heat or cold intolerance; No hair loss  MUSCULOSKELETAL: No joint pain or swelling; No muscle, back, or extremity pain  PSYCHIATRIC: No depression, anxiety, mood swings, or difficulty sleeping  HEME/LYMPH: No easy bruising, or bleeding gums  ALLERGY AND IMMUNOLOGIC: No hives or eczema	    [ ] All others negative	  [ ] Unable to obtain    PHYSICAL EXAM:  T(C): 36.4 (07-04-22 @ 04:48), Max: 36.5 (07-03-22 @ 20:25)  HR: 72 (07-04-22 @ 04:48) (63 - 75)  BP: 187/72 (07-04-22 @ 04:48) (168/64 - 196/69)  RR: 18 (07-04-22 @ 04:48) (18 - 18)  SpO2: 95% (07-04-22 @ 04:48) (95% - 96%)  Wt(kg): --  I&O's Summary    03 Jul 2022 07:01  -  04 Jul 2022 07:00  --------------------------------------------------------  IN: 770 mL / OUT: 0 mL / NET: 770 mL    04 Jul 2022 07:01  -  04 Jul 2022 10:35  --------------------------------------------------------  IN: 120 mL / OUT: 0 mL / NET: 120 mL        Appearance: Normal	  HEENT:   Normal oral mucosa, PERRL, EOMI	  Lymphatic: No lymphadenopathy  Cardiovascular: Normal S1 S2, No JVD, + murmurs, No edema  Respiratory: rhonchi  Psychiatry: A & O x 3, Mood & affect appropriate  Gastrointestinal:  Soft, Non-tender, + BS	  Skin: No rashes, No ecchymoses, No cyanosis	  Neurologic: Non-focal  Extremities: Normal range of motion, No clubbing, cyanosis or edema  Vascular: Peripheral pulses palpable 2+ bilaterally    MEDICATIONS  (STANDING):  allopurinol 100 milliGRAM(s) Oral daily  amLODIPine   Tablet 5 milliGRAM(s) Oral daily  aspirin enteric coated 81 milliGRAM(s) Oral daily  atorvastatin 40 milliGRAM(s) Oral at bedtime  buPROPion XL (24-Hour) . 150 milliGRAM(s) Oral daily  cephalexin 500 milliGRAM(s) Oral every 12 hours  dextrose 5%. 1000 milliLiter(s) (100 mL/Hr) IV Continuous <Continuous>  dextrose 5%. 1000 milliLiter(s) (50 mL/Hr) IV Continuous <Continuous>  dextrose 50% Injectable 25 Gram(s) IV Push once  dextrose 50% Injectable 12.5 Gram(s) IV Push once  dextrose 50% Injectable 25 Gram(s) IV Push once  escitalopram 10 milliGRAM(s) Oral daily  glucagon  Injectable 1 milliGRAM(s) IntraMuscular once  heparin   Injectable 5000 Unit(s) SubCutaneous every 12 hours  insulin glargine Injectable (LANTUS) 8 Unit(s) SubCutaneous at bedtime  insulin lispro (ADMELOG) corrective regimen sliding scale   SubCutaneous three times a day before meals  insulin lispro (ADMELOG) corrective regimen sliding scale   SubCutaneous at bedtime  insulin lispro Injectable (ADMELOG) 7 Unit(s) SubCutaneous three times a day before meals  lisinopril 40 milliGRAM(s) Oral daily  metoprolol succinate  milliGRAM(s) Oral daily  pantoprazole    Tablet 40 milliGRAM(s) Oral before breakfast  sodium chloride 0.65% Nasal 1 Spray(s) Both Nostrils four times a day      TELEMETRY: 	    ECG:  	  RADIOLOGY:  OTHER: 	  	  LABS:	 	    CARDIAC MARKERS:    proBNP:   Lipid Profile:   HgA1c:   TSH: Thyroid Stimulating Hormone, Serum: 2.47 uIU/mL (06-28 @ 07:19)    * Symptom: Chest burning, flushing sensation.  * HR Response: Appropriate; BP Response: Appropriate.  * Heart Rhythm: Sinus Bradycardia - 58 BPM.  * Conduction defects: Left bundle branch block, Left axis  deviation.  * ECG Abnormalities: ECG changes could not be interpreted  due to bundle branch block.  * There are medium-sized, mild defects in the basal to mid  anterior and basal to mid anteroseptal walls that are  reversible suggestive of ischemia.  * There is a small, mild defect in the inferoapical and  apical lateral walls that is reversible suggestive of  ischemia.  * There is a small, mild defect in the distal anteroseptal  wall that is fixed suggestive of infarct.  * There are medium-sized, mild to moderate defects in the  basal inferior and basal to mid inferoseptal walls that  are partially reversible suggetsive of infarct with mild  to moderate hermelinda-infarct ischemia, primarily in the  inferoseptum.  * There are meidum-sized, mild to moderate defects in the  inferolateral wall that are mostly fixed suggestive of  infarct wit mild hermelinda-infarct ischemia.  * Post-stress gated wall motion analysis was performed  (LVEF = 52 %;LVEDV = 85 ml.) revealing hypokinesis of the  inferolateral, basal inferior, basal to mid inferoseptal,  and distal anteroseptal walls.  There is paradoxical  septal motion consistent with bundle branch block.      Assessment and plan  ---------------------------  88 year old RH female with a past medical history of HTN, HLD, DM, CAD with stents who is presenting as a transfer from [place] for evaluation of possible seizure episode.    At baseline, the patient is ambulatory with a cane when she goes outside, lives alone and performs her own ADLs. The patient had an episode of nose bleeding for which she first presented to Urgent Care and then went to [facility] ED where her bleeding spontaneously stopped and she was discharged home. She then went home and was sitting down and eating a pastry when she told her daughter who was present that the patient felt like she was going to faint. The patient stated she had symptoms of her vision darkening, a symptom of epigastric discomfort and nausea, sweating and then had lost consciousness. As per the daughter, the patient began to lean back, her eyes rolled back, and to avoid falling backwards and vomiting was pushed forward by the daughter. The episode lasted several seconds and then the patient regained consciousness. EMS was called.    The patient then seemed slightly disoriented  which lasted several minutes and had completely resolved by the time that EMS had arrived. It was noted that there was some liquid by the patient which was concerning for urinary incontinence however it unclear as the patient stated that her undergarments and dress were dry. The patient was then brought to the ED at [place] and was to undergo cardiac workup and was transferred to [facility] as there was no EEG monitoring. Currently, the patient states that she feels well and denied headaches, visual changes, auditory changes, numbness, tingling, weakness.  Patient notes that in the past she had a fall several months ago while she was ambulating and thinks that she may have lost consciousness during that time.  Patient stated that in the past when she was 10 years old, she was diagnosed with seizures. These episodes were marked by not being able to speak for a brief period of time and disorientation. Stated that she feels that she might remember these episodes but was unsure. She denied taking medications for these events and noted that when she underwent menarche, these episodes stopped and she has not had any since.  Is taking Wellbutrin and has been taking the same dose for 16 years as per daughter.   pt with sig cardiac hx s/p multiple stents ?new LBBB  tele nsr  echo  may need ischemia goldstein if not done recently  will adjust cardiac meds  severe orthostatic hypotension, keep sbp >140 at rest sitting  clive stocking  am cortisol l[place]vel  echo noted with septal hypertrophy but no lvot gradient  no calcium channel blocker sec to orthostatic hypotension  ischemia goldstein as per card  increase beta blocker to 100 mg daily  stress test noted, needs cath  orthostatic noted as long as pt asymptomatic, no concern  repeat am cortisol level  cath needs ent clearance prior to intervention if needed  orthostatics noted

## 2022-07-05 LAB
ANION GAP SERPL CALC-SCNC: 10 MMOL/L — SIGNIFICANT CHANGE UP (ref 5–17)
BUN SERPL-MCNC: 16 MG/DL — SIGNIFICANT CHANGE UP (ref 7–23)
CALCIUM SERPL-MCNC: 8.6 MG/DL — SIGNIFICANT CHANGE UP (ref 8.4–10.5)
CHLORIDE SERPL-SCNC: 105 MMOL/L — SIGNIFICANT CHANGE UP (ref 96–108)
CO2 SERPL-SCNC: 26 MMOL/L — SIGNIFICANT CHANGE UP (ref 22–31)
CREAT SERPL-MCNC: 0.68 MG/DL — SIGNIFICANT CHANGE UP (ref 0.5–1.3)
EGFR: 84 ML/MIN/1.73M2 — SIGNIFICANT CHANGE UP
GLUCOSE BLDC GLUCOMTR-MCNC: 125 MG/DL — HIGH (ref 70–99)
GLUCOSE BLDC GLUCOMTR-MCNC: 166 MG/DL — HIGH (ref 70–99)
GLUCOSE BLDC GLUCOMTR-MCNC: 201 MG/DL — HIGH (ref 70–99)
GLUCOSE BLDC GLUCOMTR-MCNC: 212 MG/DL — HIGH (ref 70–99)
GLUCOSE BLDC GLUCOMTR-MCNC: 266 MG/DL — HIGH (ref 70–99)
GLUCOSE SERPL-MCNC: 210 MG/DL — HIGH (ref 70–99)
HCT VFR BLD CALC: 31 % — LOW (ref 34.5–45)
HGB BLD-MCNC: 9.8 G/DL — LOW (ref 11.5–15.5)
MCHC RBC-ENTMCNC: 28.6 PG — SIGNIFICANT CHANGE UP (ref 27–34)
MCHC RBC-ENTMCNC: 31.6 GM/DL — LOW (ref 32–36)
MCV RBC AUTO: 90.4 FL — SIGNIFICANT CHANGE UP (ref 80–100)
NRBC # BLD: 0 /100 WBCS — SIGNIFICANT CHANGE UP (ref 0–0)
PLATELET # BLD AUTO: 178 K/UL — SIGNIFICANT CHANGE UP (ref 150–400)
POTASSIUM SERPL-MCNC: 4.2 MMOL/L — SIGNIFICANT CHANGE UP (ref 3.5–5.3)
POTASSIUM SERPL-SCNC: 4.2 MMOL/L — SIGNIFICANT CHANGE UP (ref 3.5–5.3)
RBC # BLD: 3.43 M/UL — LOW (ref 3.8–5.2)
RBC # FLD: 13.9 % — SIGNIFICANT CHANGE UP (ref 10.3–14.5)
SODIUM SERPL-SCNC: 141 MMOL/L — SIGNIFICANT CHANGE UP (ref 135–145)
WBC # BLD: 5.95 K/UL — SIGNIFICANT CHANGE UP (ref 3.8–10.5)
WBC # FLD AUTO: 5.95 K/UL — SIGNIFICANT CHANGE UP (ref 3.8–10.5)

## 2022-07-05 PROCEDURE — 99232 SBSQ HOSP IP/OBS MODERATE 35: CPT

## 2022-07-05 PROCEDURE — 99152 MOD SED SAME PHYS/QHP 5/>YRS: CPT

## 2022-07-05 PROCEDURE — 99232 SBSQ HOSP IP/OBS MODERATE 35: CPT | Mod: FS

## 2022-07-05 PROCEDURE — 93454 CORONARY ARTERY ANGIO S&I: CPT | Mod: 26

## 2022-07-05 RX ORDER — SODIUM CHLORIDE 0.65 %
1 AEROSOL, SPRAY (ML) NASAL
Refills: 0 | Status: DISCONTINUED | OUTPATIENT
Start: 2022-07-05 | End: 2022-07-06

## 2022-07-05 RX ORDER — SODIUM CHLORIDE 9 MG/ML
250 INJECTION INTRAMUSCULAR; INTRAVENOUS; SUBCUTANEOUS ONCE
Refills: 0 | Status: COMPLETED | OUTPATIENT
Start: 2022-07-05 | End: 2022-07-05

## 2022-07-05 RX ORDER — BACITRACIN ZINC 500 UNIT/G
1 OINTMENT IN PACKET (EA) TOPICAL
Refills: 0 | Status: DISCONTINUED | OUTPATIENT
Start: 2022-07-05 | End: 2022-07-06

## 2022-07-05 RX ORDER — SODIUM CHLORIDE 9 MG/ML
1000 INJECTION INTRAMUSCULAR; INTRAVENOUS; SUBCUTANEOUS
Refills: 0 | Status: DISCONTINUED | OUTPATIENT
Start: 2022-07-05 | End: 2022-07-05

## 2022-07-05 RX ORDER — INSULIN GLARGINE 100 [IU]/ML
10 INJECTION, SOLUTION SUBCUTANEOUS AT BEDTIME
Refills: 0 | Status: DISCONTINUED | OUTPATIENT
Start: 2022-07-05 | End: 2022-07-06

## 2022-07-05 RX ADMIN — ATORVASTATIN CALCIUM 40 MILLIGRAM(S): 80 TABLET, FILM COATED ORAL at 21:36

## 2022-07-05 RX ADMIN — Medication 7 UNIT(S): at 17:48

## 2022-07-05 RX ADMIN — ESCITALOPRAM OXALATE 10 MILLIGRAM(S): 10 TABLET, FILM COATED ORAL at 09:05

## 2022-07-05 RX ADMIN — HEPARIN SODIUM 5000 UNIT(S): 5000 INJECTION INTRAVENOUS; SUBCUTANEOUS at 06:30

## 2022-07-05 RX ADMIN — INSULIN GLARGINE 10 UNIT(S): 100 INJECTION, SOLUTION SUBCUTANEOUS at 21:37

## 2022-07-05 RX ADMIN — BUPROPION HYDROCHLORIDE 150 MILLIGRAM(S): 150 TABLET, EXTENDED RELEASE ORAL at 09:05

## 2022-07-05 RX ADMIN — Medication 2: at 09:05

## 2022-07-05 RX ADMIN — Medication 1 SPRAY(S): at 18:19

## 2022-07-05 RX ADMIN — Medication 500 MILLIGRAM(S): at 06:29

## 2022-07-05 RX ADMIN — Medication 1: at 13:38

## 2022-07-05 RX ADMIN — Medication 100 MILLIGRAM(S): at 06:29

## 2022-07-05 RX ADMIN — SODIUM CHLORIDE 75 MILLILITER(S): 9 INJECTION INTRAMUSCULAR; INTRAVENOUS; SUBCUTANEOUS at 13:29

## 2022-07-05 RX ADMIN — Medication 1 APPLICATION(S): at 18:19

## 2022-07-05 RX ADMIN — PANTOPRAZOLE SODIUM 40 MILLIGRAM(S): 20 TABLET, DELAYED RELEASE ORAL at 06:29

## 2022-07-05 RX ADMIN — LISINOPRIL 40 MILLIGRAM(S): 2.5 TABLET ORAL at 06:30

## 2022-07-05 RX ADMIN — Medication 100 MILLIGRAM(S): at 09:05

## 2022-07-05 RX ADMIN — AMLODIPINE BESYLATE 5 MILLIGRAM(S): 2.5 TABLET ORAL at 06:29

## 2022-07-05 RX ADMIN — Medication 81 MILLIGRAM(S): at 09:05

## 2022-07-05 RX ADMIN — Medication 500 MILLIGRAM(S): at 17:47

## 2022-07-05 RX ADMIN — Medication 7 UNIT(S): at 09:04

## 2022-07-05 RX ADMIN — SODIUM CHLORIDE 750 MILLILITER(S): 9 INJECTION INTRAMUSCULAR; INTRAVENOUS; SUBCUTANEOUS at 13:29

## 2022-07-05 NOTE — CHART NOTE - NSCHARTNOTEFT_GEN_A_CORE
Received pt from floor to [facility] prior to cath. Patient is s/p Nasal cauterization for epistaxis. Patient had an episode of nasal bleeding today morning ( pt states it stopped after few minutes). Currently Plavix on hold, but pt is taking ASA. Discussed with Dr. Mcgrath and is aware of patient's condition.  On evaluation no active bleeding at this time.

## 2022-07-05 NOTE — PROGRESS NOTE ADULT - SUBJECTIVE AND OBJECTIVE BOX
afberile    REVIEW OF SYSTEMS:  GEN: no fever,    no chills  RESP: no SOB,   no cough  CVS: no chest pain,   no palpitations  GI: no abdominal pain,   no nausea,   no vomiting,   no constipation,   no diarrhea  : no dysuria,   no frequency  NEURO: no headache,   no dizziness  PSYCH: no depression,   not anxious  Derm : no rash    MEDICATIONS  (STANDING):  allopurinol 100 milliGRAM(s) Oral daily  amLODIPine   Tablet 5 milliGRAM(s) Oral daily  aspirin enteric coated 81 milliGRAM(s) Oral daily  atorvastatin 40 milliGRAM(s) Oral at bedtime  buPROPion XL (24-Hour) . 150 milliGRAM(s) Oral daily  cephalexin 500 milliGRAM(s) Oral every 12 hours  dextrose 5%. 1000 milliLiter(s) (50 mL/Hr) IV Continuous <Continuous>  dextrose 5%. 1000 milliLiter(s) (100 mL/Hr) IV Continuous <Continuous>  dextrose 50% Injectable 25 Gram(s) IV Push once  dextrose 50% Injectable 12.5 Gram(s) IV Push once  dextrose 50% Injectable 25 Gram(s) IV Push once  escitalopram 10 milliGRAM(s) Oral daily  glucagon  Injectable 1 milliGRAM(s) IntraMuscular once  heparin   Injectable 5000 Unit(s) SubCutaneous every 12 hours  insulin glargine Injectable (LANTUS) 10 Unit(s) SubCutaneous at bedtime  insulin lispro (ADMELOG) corrective regimen sliding scale   SubCutaneous three times a day before meals  insulin lispro (ADMELOG) corrective regimen sliding scale   SubCutaneous at bedtime  insulin lispro Injectable (ADMELOG) 7 Unit(s) SubCutaneous three times a day before meals  lisinopril 40 milliGRAM(s) Oral daily  metoprolol succinate  milliGRAM(s) Oral daily  pantoprazole    Tablet 40 milliGRAM(s) Oral before breakfast  sodium chloride 0.9%. 1000 milliLiter(s) (75 mL/Hr) IV Continuous <Continuous>    MEDICATIONS  (PRN):  dextrose Oral Gel 15 Gram(s) Oral once PRN Blood Glucose LESS THAN 70 milliGRAM(s)/deciliter      Vital Signs Last 24 Hrs  T(C): 36.6 (05 Jul 2022 11:16), Max: 36.8 (05 Jul 2022 04:12)  T(F): 97.8 (05 Jul 2022 11:16), Max: 98.3 (05 Jul 2022 04:12)  HR: 60 (05 Jul 2022 13:15) (57 - 72)  BP: 142/58 (05 Jul 2022 13:15) (121/64 - 157/61)  BP(mean): 84 (05 Jul 2022 13:15) (84 - 89)  RR: 16 (05 Jul 2022 13:15) (16 - 18)  SpO2: 98% (05 Jul 2022 13:15) (96% - 98%)  CAPILLARY BLOOD GLUCOSE      POCT Blood Glucose.: 166 mg/dL (05 Jul 2022 13:33)  POCT Blood Glucose.: 266 mg/dL (05 Jul 2022 11:15)  POCT Blood Glucose.: 201 mg/dL (05 Jul 2022 08:47)  POCT Blood Glucose.: 239 mg/dL (04 Jul 2022 21:36)  POCT Blood Glucose.: 152 mg/dL (04 Jul 2022 17:21)    I&O's Summary    04 Jul 2022 07:01  -  05 Jul 2022 07:00  --------------------------------------------------------  IN: 600 mL / OUT: 0 mL / NET: 600 mL    05 Jul 2022 07:01  -  05 Jul 2022 13:40  --------------------------------------------------------  IN: 100 mL / OUT: 0 mL / NET: 100 mL        PHYSICAL EXAM:  HEAD:  Atraumatic, Normocephalic  NECK: Supple, No   JVD  CHEST/LUNG:   no     rales,     no,    rhonchi  HEART: Regular rate and rhythm;         murmur  ABDOMEN: Soft, Nontender, ;   EXTREMITIES:        edema  NEUROLOGY:  alenort    LABS:                        9.8    5.95  )-----------( 178      ( 05 Jul 2022 06:45 )             31.0     07-05    141  |  105  |  16  ----------------------------<  210<H>  4.2   |  26  |  0.68    Ca    8.6      05 Jul 2022 06:44                      Thyroid Stimulating Hormone, Serum: 2.47 uIU/mL (06-28 @ 07:19)          Consultant(s) Notes Reviewed:      Care Discussed with Consultants/Other Providers:

## 2022-07-05 NOTE — PROVIDER CONTACT NOTE (OTHER) - ACTION/TREATMENT ORDERED:
NP aware & assessed patient & ordered to hold pressure & place ice pack. Pressure held and ice pack placed. Nosebleed resolved. NP consulted with ENT.

## 2022-07-05 NOTE — PROGRESS NOTE ADULT - SUBJECTIVE AND OBJECTIVE BOX
CARDIOLOGY     PROGRESS  NOTE   ________________________________________________    CHIEF COMPLAINT:Patient is a 88y old  Female who presents with a chief complaint of syncope/ ?  seizures (04 Jul 2022 11:05)  no complain.  	  REVIEW OF SYSTEMS:  CONSTITUTIONAL: No fever, weight loss, or fatigue  EYES: No eye pain, visual disturbances, or discharge  ENT:  No difficulty hearing, tinnitus, vertigo; No sinus or throat pain  NECK: No pain or stiffness  RESPIRATORY: No cough, wheezing, chills or hemoptysis; No Shortness of Breath  CARDIOVASCULAR: No chest pain, palpitations, passing out, dizziness, or leg swelling  GASTROINTESTINAL: No abdominal or epigastric pain. No nausea, vomiting, or hematemesis; No diarrhea or constipation. No melena or hematochezia.  GENITOURINARY: No dysuria, frequency, hematuria, or incontinence  NEUROLOGICAL: No headaches, memory loss, loss of strength, numbness, or tremors  SKIN: No itching, burning, rashes, or lesions   LYMPH Nodes: No enlarged glands  ENDOCRINE: No heat or cold intolerance; No hair loss  MUSCULOSKELETAL: No joint pain or swelling; No muscle, back, or extremity pain  PSYCHIATRIC: No depression, anxiety, mood swings, or difficulty sleeping  HEME/LYMPH: No easy bruising, or bleeding gums  ALLERGY AND IMMUNOLOGIC: No hives or eczema	    [ ] All others negative	  [ ] Unable to obtain    PHYSICAL EXAM:  T(C): 36.8 (07-05-22 @ 04:12), Max: 36.8 (07-05-22 @ 04:12)  HR: 72 (07-05-22 @ 04:12) (62 - 72)  BP: 137/77 (07-05-22 @ 04:12) (121/64 - 149/68)  RR: 18 (07-05-22 @ 04:12) (18 - 18)  SpO2: 97% (07-05-22 @ 04:12) (94% - 97%)  Wt(kg): --  I&O's Summary    03 Jul 2022 07:01  -  04 Jul 2022 07:00  --------------------------------------------------------  IN: 770 mL / OUT: 0 mL / NET: 770 mL    04 Jul 2022 07:01  -  05 Jul 2022 06:36  --------------------------------------------------------  IN: 600 mL / OUT: 0 mL / NET: 600 mL        Appearance: Normal	  HEENT:   Normal oral mucosa, PERRL, EOMI	  Lymphatic: No lymphadenopathy  Cardiovascular: Normal S1 S2, No JVD, + murmurs, No edema  Respiratory: Lungs clear to auscultation	  Psychiatry: A & O x 3, Mood & affect appropriate  Gastrointestinal:  Soft, Non-tender, + BS	  Skin: No rashes, No ecchymoses, No cyanosis	  Neurologic: Non-focal  Extremities: Normal range of motion, No clubbing, cyanosis or edema  Vascular: Peripheral pulses palpable 2+ bilaterally    MEDICATIONS  (STANDING):  allopurinol 100 milliGRAM(s) Oral daily  amLODIPine   Tablet 5 milliGRAM(s) Oral daily  aspirin enteric coated 81 milliGRAM(s) Oral daily  atorvastatin 40 milliGRAM(s) Oral at bedtime  buPROPion XL (24-Hour) . 150 milliGRAM(s) Oral daily  cephalexin 500 milliGRAM(s) Oral every 12 hours  dextrose 5%. 1000 milliLiter(s) (50 mL/Hr) IV Continuous <Continuous>  dextrose 5%. 1000 milliLiter(s) (100 mL/Hr) IV Continuous <Continuous>  dextrose 50% Injectable 12.5 Gram(s) IV Push once  dextrose 50% Injectable 25 Gram(s) IV Push once  dextrose 50% Injectable 25 Gram(s) IV Push once  escitalopram 10 milliGRAM(s) Oral daily  glucagon  Injectable 1 milliGRAM(s) IntraMuscular once  heparin   Injectable 5000 Unit(s) SubCutaneous every 12 hours  insulin glargine Injectable (LANTUS) 8 Unit(s) SubCutaneous at bedtime  insulin lispro (ADMELOG) corrective regimen sliding scale   SubCutaneous three times a day before meals  insulin lispro (ADMELOG) corrective regimen sliding scale   SubCutaneous at bedtime  insulin lispro Injectable (ADMELOG) 7 Unit(s) SubCutaneous three times a day before meals  lisinopril 40 milliGRAM(s) Oral daily  metoprolol succinate  milliGRAM(s) Oral daily  pantoprazole    Tablet 40 milliGRAM(s) Oral before breakfast      TELEMETRY: 	    ECG:  	  RADIOLOGY:  OTHER: 	  	  LABS:	 	    CARDIAC MARKERS:                  proBNP:   Lipid Profile:   HgA1c:   TSH: Thyroid Stimulating Hormone, Serum: 2.47 uIU/mL (06-28 @ 07:19)          Assessment and plan  ---------------------------  88 year old RH female with a past medical history of HTN, HLD, DM, CAD with stents who is presenting as a transfer from [place] for evaluation of possible seizure episode.    At baseline, the patient is ambulatory with a cane when she goes outside, lives alone and performs her own ADLs. The patient had an episode of nose bleeding for which she first presented to Urgent Care and then went to [facility] ED where her bleeding spontaneously stopped and she was discharged home. She then went home and was sitting down and eating a pastry when she told her daughter who was present that the patient felt like she was going to faint. The patient stated she had symptoms of her vision darkening, a symptom of epigastric discomfort and nausea, sweating and then had lost consciousness. As per the daughter, the patient began to lean back, her eyes rolled back, and to avoid falling backwards and vomiting was pushed forward by the daughter. The episode lasted several seconds and then the patient regained consciousness. EMS was called.    The patient then seemed slightly disoriented  which lasted several minutes and had completely resolved by the time that EMS had arrived. It was noted that there was some liquid by the patient which was concerning for urinary incontinence however it unclear as the patient stated that her undergarments and dress were dry. The patient was then brought to the ED at [place] and was to undergo cardiac workup and was transferred to [facility] as there was no EEG monitoring. Currently, the patient states that she feels well and denied headaches, visual changes, auditory changes, numbness, tingling, weakness.  Patient notes that in the past she had a fall several months ago while she was ambulating and thinks that she may have lost consciousness during that time.  Patient stated that in the past when she was 10 years old, she was diagnosed with seizures. These episodes were marked by not being able to speak for a brief period of time and disorientation. Stated that she feels that she might remember these episodes but was unsure. She denied taking medications for these events and noted that when she underwent menarche, these episodes stopped and she has not had any since.  Is taking Wellbutrin and has been taking the same dose for 16 years as per daughter.   pt with sig cardiac hx s/p multiple stents ?new LBBB  tele nsr  echo  may need ischemia goldstein if not done recently  will adjust cardiac meds  severe orthostatic hypotension, keep sbp >140 at rest sitting  clive stocking  am cortisol lervel  echo noted with septal hypertrophy but no lvot gradient  no calcium channel blocker sec to orthostatic hypotension  ischemia goldstein as per card  increase beta blocker to 100 mg daily  stress test noted, needs cath plan by Cardiology  orthostatic noted as long as pt asymptomatic, no concern  repeat am cortisol level  cath needs ent clearance prior to intervention if needed  orthostatics noted

## 2022-07-05 NOTE — PROGRESS NOTE ADULT - SUBJECTIVE AND OBJECTIVE BOX
INTERVAL HPI/OVERNIGHT EVENTS:  no GI events or complaints  states small amount of bleeding from left nare    pending cath today  no CP, SOB, dyspnea    MEDICATIONS  (STANDING):  allopurinol 100 milliGRAM(s) Oral daily  amLODIPine   Tablet 5 milliGRAM(s) Oral daily  aspirin enteric coated 81 milliGRAM(s) Oral daily  atorvastatin 40 milliGRAM(s) Oral at bedtime  buPROPion XL (24-Hour) . 150 milliGRAM(s) Oral daily  cephalexin 500 milliGRAM(s) Oral every 12 hours  dextrose 5%. 1000 milliLiter(s) (50 mL/Hr) IV Continuous <Continuous>  dextrose 5%. 1000 milliLiter(s) (100 mL/Hr) IV Continuous <Continuous>  dextrose 50% Injectable 25 Gram(s) IV Push once  dextrose 50% Injectable 12.5 Gram(s) IV Push once  dextrose 50% Injectable 25 Gram(s) IV Push once  escitalopram 10 milliGRAM(s) Oral daily  glucagon  Injectable 1 milliGRAM(s) IntraMuscular once  heparin   Injectable 5000 Unit(s) SubCutaneous every 12 hours  insulin glargine Injectable (LANTUS) 10 Unit(s) SubCutaneous at bedtime  insulin lispro (ADMELOG) corrective regimen sliding scale   SubCutaneous three times a day before meals  insulin lispro (ADMELOG) corrective regimen sliding scale   SubCutaneous at bedtime  insulin lispro Injectable (ADMELOG) 7 Unit(s) SubCutaneous three times a day before meals  lisinopril 40 milliGRAM(s) Oral daily  metoprolol succinate  milliGRAM(s) Oral daily  pantoprazole    Tablet 40 milliGRAM(s) Oral before breakfast    MEDICATIONS  (PRN):  dextrose Oral Gel 15 Gram(s) Oral once PRN Blood Glucose LESS THAN 70 milliGRAM(s)/deciliter      Allergies  No Known Allergies      Review of Systems:  General:  No wt loss, fevers, chills, night sweats, fatigue  CV:  No pain, +CAD s/p stents +LBBB +HTN ; see HPI  Resp:  No dyspnea, cough, tachypnea, wheezing  GI:  see HPI  :  No pain, bleeding, incontinence, nocturia  Muscle:  No pain, weakness  Neuro:  No weakness, tingling, memory problems  Psych:  No fatigue, insomnia, mood problems, depression  Endocrine:  No polyuria, polydypsia, cold/heat intolerance  Heme:  No petechiae, ecchymosis, easy bruisability  +epistaxis (see hPI)  Skin:  No rash, tattoos, scars, edema      Vital Signs Last 24 Hrs  T(C): 36.8 (05 Jul 2022 04:12), Max: 36.8 (05 Jul 2022 04:12)  T(F): 98.3 (05 Jul 2022 04:12), Max: 98.3 (05 Jul 2022 04:12)  HR: 72 (05 Jul 2022 04:12) (62 - 72)  BP: 137/77 (05 Jul 2022 04:12) (121/64 - 149/68)  BP(mean): --  RR: 18 (05 Jul 2022 04:12) (18 - 18)  SpO2: 97% (05 Jul 2022 04:12) (94% - 97%)    PHYSICAL EXAM:  Constitutional: NAD, well-developed elderly female  sitting OOB to chair, completed breakfast  Neck: No LAD, supple no JVD  Respiratory: Clear b/l no accessory muscle use  Cardiovascular: S1 and S2, RRR  Gastrointestinal: BS+, soft, NT/ND, neg HSM  Extremities: No peripheral edema, neg clubbing, cyanosis  Vascular: 2+ peripheral pulses  Neurological: A/O x 3, no focal deficits  Psychiatric: Normal mood, normal affect  Skin: No rashes, anicteric    LABS:                        9.8    5.95  )-----------( 178      ( 05 Jul 2022 06:45 )             31.0     07-05    141  |  105  |  16  ----------------------------<  210<H>  4.2   |  26  |  0.68    Ca    8.6      05 Jul 2022 06:44              RADIOLOGY & ADDITIONAL TESTS:

## 2022-07-05 NOTE — PROGRESS NOTE ADULT - ASSESSMENT
89 yo female with PMHX DM, HTN, HLD, CAD present to ED s/p syncopal episode     - no acute ischemia  - known cad s/p pci in the past  - lbbb uncertain duration  - had not been overly concerned about ischemia but her nuclear stress test from yesterday is very abnormal with multiple defects in several vasc territories  - discussed cath at length with the patient and with her daughter marco antonio by phone  - will arrange cath today to define anatomy and need for further intervention; prefer diagnostic given epistaxis    - no vol ol    - no arrhythmias have been noted on tele  - syncopal episode was felt to be most likely vagally mediated, with epistaxis earlier in the day  - echocardiogram with hyperdynamic lv function  - noting orthostatic bps would encourage adequate hydration, compression stockings.    - bp had been low and she had been very orthostatic  - very hypertensive on metoprolol and lisinopril  - have resumed amlodipine    - cont asa alone  - off plavix given epistaxis  - cont statin     - trend creatinine and electrolytes. Keep K>4, mg>2  - will follow with you

## 2022-07-05 NOTE — CHART NOTE - NSCHARTNOTEFT_GEN_A_CORE
Due to patient deconditioning and generalized weakness, secondary to her Orthostatic hypotension and CAD.  She will require a transport chair.  She is unable to self propel in a standard wheelchair.  This is necessary to achieve daily tasks and therapies which cannot be achieved with the use of a cane or rolling walker.  Patient and family are in agreement with transport chair use at home and assistance will be provided if needed.    Leidy Macias, KALI-C  [id number]

## 2022-07-05 NOTE — PROGRESS NOTE ADULT - SUBJECTIVE AND OBJECTIVE BOX
[facility] - Kasia Cook, Bart, Blas, Kay, Juvneal Jeffries  Office Number:  [phone]    Patient resting comfortably in bed in NAD.  Laying flat with no respiratory distress.  No complaints of chest pain, dyspnea, palpitations, PND, or orthopnea.    ROS: negative unless otherwise mentioned.    Telemetry:  sr    MEDICATIONS  (STANDING):  allopurinol 100 milliGRAM(s) Oral daily  amLODIPine   Tablet 5 milliGRAM(s) Oral daily  aspirin enteric coated 81 milliGRAM(s) Oral daily  atorvastatin 40 milliGRAM(s) Oral at bedtime  buPROPion XL (24-Hour) . 150 milliGRAM(s) Oral daily  cephalexin 500 milliGRAM(s) Oral every 12 hours  dextrose 5%. 1000 milliLiter(s) (50 mL/Hr) IV Continuous <Continuous>  dextrose 5%. 1000 milliLiter(s) (100 mL/Hr) IV Continuous <Continuous>  dextrose 50% Injectable 25 Gram(s) IV Push once  dextrose 50% Injectable 12.5 Gram(s) IV Push once  dextrose 50% Injectable 25 Gram(s) IV Push once  escitalopram 10 milliGRAM(s) Oral daily  glucagon  Injectable 1 milliGRAM(s) IntraMuscular once  heparin   Injectable 5000 Unit(s) SubCutaneous every 12 hours  insulin glargine Injectable (LANTUS) 10 Unit(s) SubCutaneous at bedtime  insulin lispro (ADMELOG) corrective regimen sliding scale   SubCutaneous three times a day before meals  insulin lispro (ADMELOG) corrective regimen sliding scale   SubCutaneous at bedtime  insulin lispro Injectable (ADMELOG) 7 Unit(s) SubCutaneous three times a day before meals  lisinopril 40 milliGRAM(s) Oral daily  metoprolol succinate  milliGRAM(s) Oral daily  pantoprazole    Tablet 40 milliGRAM(s) Oral before breakfast    MEDICATIONS  (PRN):  dextrose Oral Gel 15 Gram(s) Oral once PRN Blood Glucose LESS THAN 70 milliGRAM(s)/deciliter      Allergies    No Known Allergies    Intolerances        Vital Signs Last 24 Hrs  T(C): 36.8 (05 Jul 2022 04:12), Max: 36.8 (05 Jul 2022 04:12)  T(F): 98.3 (05 Jul 2022 04:12), Max: 98.3 (05 Jul 2022 04:12)  HR: 72 (05 Jul 2022 04:12) (62 - 72)  BP: 137/77 (05 Jul 2022 04:12) (121/64 - 149/68)  BP(mean): --  RR: 18 (05 Jul 2022 04:12) (18 - 18)  SpO2: 97% (05 Jul 2022 04:12) (94% - 97%)    I&O's Summary    04 Jul 2022 07:01  -  05 Jul 2022 07:00  --------------------------------------------------------  IN: 600 mL / OUT: 0 mL / NET: 600 mL    05 Jul 2022 07:01  -  05 Jul 2022 09:34  --------------------------------------------------------  IN: 100 mL / OUT: 0 mL / NET: 100 mL        ON EXAM:    Constitutional: well-nourished, well-developed, NAD   HEENT:  MMM, sclerae anicteric, conjunctivae clear, no oral cyanosis.  Pulmonary: Non-labored, breath sounds are clear bilaterally, No wheezing, rales or rhonchi  Cardiovascular: Regular, S1 and S2.  No murmur.  No rubs, gallops or clicks  Gastrointestinal: Bowel Sounds present, soft, nontender.   Lymph: No peripheral edema.   Neurological: Alert, no focal deficits  Skin: No rashes.  Psych:  Mood & affect appropriate    LABS: All Labs Reviewed:                        9.8    5.95  )-----------( 178      ( 05 Jul 2022 06:45 )             31.0     05 Jul 2022 06:44    141    |  105    |  16     ----------------------------<  210    4.2     |  26     |  0.68     Ca    8.6        05 Jul 2022 06:44            Blood Culture:

## 2022-07-05 NOTE — PROGRESS NOTE ADULT - ASSESSMENT
87 yo female with PMHX DM, HTN, HLD, CAD present to ED 6/27/22 s/p syncopal episode. Noted to have significant epistaxis 6/26 (bleeding from nose, spit blood through mouth and swallowed blood per pt) seen in ED 6/26  now noted to have dark BM FOBT + 6/28/22 PM  BMs prior to admission were brown  Remote history of PUD ([facility] 2 years ago)    #FOBT + anemia - suspect 2/2 passage of swallowed blood from GI tract; prior to epistaxis (while on DAPT) stools have been brown  Hgb/HD remains stable though slight drop from admission  Pt very adamant about not wanting any repeat endoscopies; daughter in agreement  [facility] records obtained/reviewed : EGD 10/16/2020  Grade A reflux esophagitis, 3 linear non bleeding esophageal ulcers, 2 non bleeding gastric ulcers, normal duodenum    -PO PPI  -monitor CBC  -PO diet as tolerated  -no plans for invasive GI work-up    #recurrent epistaxis, s/p packing by ENT    Discussed with pt, all questions answered  Discussed with Medicine team   Cardiac work-up in progress    Stable GI issues  Will Sign off care. Please recall prn for GI concerns.  Thank You.      Francis Lorenzo PA-C    [facility]  [phone]  After hours and weekend coverage [phone]

## 2022-07-05 NOTE — PROGRESS NOTE ADULT - SUBJECTIVE AND OBJECTIVE BOX
ENT ISSUE/POD:  Right epistaxis    HPI: 89 YO F with PMH of  DM, HTN, HLD, CAD present to ED 6/27/22 s/p syncopal episode. ENT was consulted for evaluation of Right Epistaxis. Per RN, pt was sleeping,  started to bleed spontaneously from mostly from Right Nare, and spit out blood. Primary team applied pressure prior to evaluation. Per pt, episode of  epistaxis on 6/26 which was controlled after applying pressure in ED.Pt takes Plavix 75mg PO,  ASA 81mg PO QD and SC Heparin . Pt denies recent Nasal covid swabs, digital trauma, NG Tube placement, NC use. Also denies N/V, SOB, sinus pain, cough, sore throat, recent URI,  constipation, runny nose, dysphagia/Odynophagia,  recent travel/sick contacts. Pt. underwent cauterization of anterior nasal septum and was packed with xeroform packing on 6/30 . The  packing was removed on 7/2 with no bleeding. Pt. states she bled this morning again and required no packing. Pt. was seen after cardiac cath with no bleeding. Placed bacitracin in both nares.            PAST MEDICAL & SURGICAL HISTORY:  Diabetes      Hypertension, unspecified type      Stented coronary artery      Hyperlipidemia, unspecified hyperlipidemia type        Allergies    No Known Allergies    Intolerances      MEDICATIONS  (STANDING):  allopurinol 100 milliGRAM(s) Oral daily  amLODIPine   Tablet 5 milliGRAM(s) Oral daily  aspirin enteric coated 81 milliGRAM(s) Oral daily  atorvastatin 40 milliGRAM(s) Oral at bedtime  buPROPion XL (24-Hour) . 150 milliGRAM(s) Oral daily  cephalexin 500 milliGRAM(s) Oral every 12 hours  dextrose 5%. 1000 milliLiter(s) (50 mL/Hr) IV Continuous <Continuous>  dextrose 5%. 1000 milliLiter(s) (100 mL/Hr) IV Continuous <Continuous>  dextrose 50% Injectable 25 Gram(s) IV Push once  dextrose 50% Injectable 12.5 Gram(s) IV Push once  dextrose 50% Injectable 25 Gram(s) IV Push once  escitalopram 10 milliGRAM(s) Oral daily  glucagon  Injectable 1 milliGRAM(s) IntraMuscular once  heparin   Injectable 5000 Unit(s) SubCutaneous every 12 hours  insulin glargine Injectable (LANTUS) 10 Unit(s) SubCutaneous at bedtime  insulin lispro (ADMELOG) corrective regimen sliding scale   SubCutaneous three times a day before meals  insulin lispro (ADMELOG) corrective regimen sliding scale   SubCutaneous at bedtime  insulin lispro Injectable (ADMELOG) 7 Unit(s) SubCutaneous three times a day before meals  lisinopril 40 milliGRAM(s) Oral daily  metoprolol succinate  milliGRAM(s) Oral daily  pantoprazole    Tablet 40 milliGRAM(s) Oral before breakfast    MEDICATIONS  (PRN):  dextrose Oral Gel 15 Gram(s) Oral once PRN Blood Glucose LESS THAN 70 milliGRAM(s)/deciliter      Social History: see consult  note    Family history: see consult note    ROS:   ENT: all negative except as noted in HPI   Pulm: denies SOB, cough, hemoptysis  Neuro: denies numbness/tingling, loss of sensation  Endo: denies heat/cold intolerance, excessive sweating      Vital Signs Last 24 Hrs  T(C): 36.6 (05 Jul 2022 11:16), Max: 36.8 (05 Jul 2022 04:12)  T(F): 97.8 (05 Jul 2022 11:16), Max: 98.3 (05 Jul 2022 04:12)  HR: 60 (05 Jul 2022 15:15) (57 - 72)  BP: 163/63 (05 Jul 2022 15:15) (121/64 - 179/66)  BP(mean): 90 (05 Jul 2022 15:15) (84 - 97)  RR: 16 (05 Jul 2022 13:15) (16 - 18)  SpO2: 98% (05 Jul 2022 13:15) (96% - 98%)                          9.8    5.95  )-----------( 178      ( 05 Jul 2022 06:45 )             31.0    07-05    141  |  105  |  16  ----------------------------<  210<H>  4.2   |  26  |  0.68    Ca    8.6      05 Jul 2022 06:44         PHYSICAL EXAM:  Gen: NAD  Skin: No rashes, bruises, or lesions  Head: Normocephalic, Atraumatic  Face: no edema, erythema, or fluctuance. Parotid glands soft without mass  Eyes: no scleral injecti  Nose: Nares bilaterally patent, no discharge, no bleeding, bacitracin placed bilaterally   with no blood in posterior oropharynx  Neck: Flat, supple, no lymphadenopathy, trachea midline, no masses  Lymphatic: No lymphadenopathy  Resp: breathing easily, no stridor  Neuro: facial nerve intact, no facial droop

## 2022-07-05 NOTE — PROGRESS NOTE ADULT - SUBJECTIVE AND OBJECTIVE BOX
Chief complaint  Patient is a 88y old  Female who presents with a chief complaint of syncope/ ?  seizures (05 Jul 2022 13:40)   Review of systems  Patient in bed, looks comfortable, no hypoglycemic episodes.    Labs and Fingersticks  CAPILLARY BLOOD GLUCOSE      POCT Blood Glucose.: 166 mg/dL (05 Jul 2022 13:33)  POCT Blood Glucose.: 266 mg/dL (05 Jul 2022 11:15)  POCT Blood Glucose.: 201 mg/dL (05 Jul 2022 08:47)  POCT Blood Glucose.: 239 mg/dL (04 Jul 2022 21:36)  POCT Blood Glucose.: 152 mg/dL (04 Jul 2022 17:21)      Anion Gap, Serum: 10 (07-05 @ 06:44)      Calcium, Total Serum: 8.6 (07-05 @ 06:44)          07-05    141  |  105  |  16  ----------------------------<  210<H>  4.2   |  26  |  0.68    Ca    8.6      05 Jul 2022 06:44                          9.8    5.95  )-----------( 178      ( 05 Jul 2022 06:45 )             31.0     Medications  MEDICATIONS  (STANDING):  allopurinol 100 milliGRAM(s) Oral daily  amLODIPine   Tablet 5 milliGRAM(s) Oral daily  aspirin enteric coated 81 milliGRAM(s) Oral daily  atorvastatin 40 milliGRAM(s) Oral at bedtime  buPROPion XL (24-Hour) . 150 milliGRAM(s) Oral daily  cephalexin 500 milliGRAM(s) Oral every 12 hours  dextrose 5%. 1000 milliLiter(s) (50 mL/Hr) IV Continuous <Continuous>  dextrose 5%. 1000 milliLiter(s) (100 mL/Hr) IV Continuous <Continuous>  dextrose 50% Injectable 25 Gram(s) IV Push once  dextrose 50% Injectable 12.5 Gram(s) IV Push once  dextrose 50% Injectable 25 Gram(s) IV Push once  escitalopram 10 milliGRAM(s) Oral daily  glucagon  Injectable 1 milliGRAM(s) IntraMuscular once  heparin   Injectable 5000 Unit(s) SubCutaneous every 12 hours  insulin glargine Injectable (LANTUS) 10 Unit(s) SubCutaneous at bedtime  insulin lispro (ADMELOG) corrective regimen sliding scale   SubCutaneous three times a day before meals  insulin lispro (ADMELOG) corrective regimen sliding scale   SubCutaneous at bedtime  insulin lispro Injectable (ADMELOG) 7 Unit(s) SubCutaneous three times a day before meals  lisinopril 40 milliGRAM(s) Oral daily  metoprolol succinate  milliGRAM(s) Oral daily  pantoprazole    Tablet 40 milliGRAM(s) Oral before breakfast      Physical Exam  General: Patient comfortable in bed  Vital Signs Last 12 Hrs  T(F): 97.8 (07-05-22 @ 11:16), Max: 98.3 (07-05-22 @ 04:12)  HR: 60 (07-05-22 @ 13:15) (57 - 72)  BP: 142/58 (07-05-22 @ 13:15) (133/67 - 157/61)  BP(mean): 84 (07-05-22 @ 13:15) (84 - 89)  RR: 16 (07-05-22 @ 13:15) (16 - 18)  SpO2: 98% (07-05-22 @ 13:15) (96% - 98%)  Neck: No palpable thyroid nodules.  CVS: S1S2, No murmurs  Respiratory: No wheezing, no crepitations  GI: Abdomen soft, bowel sounds positive  Musculoskeletal:  edema lower extremities.   Skin: No skin rashes, no ecchymosis    Diagnostics    A1C with Estimated Average Glucose: Routine  Cancel Reason: Lab Operations Cancel (06-28 @ 13:18)  Free Thyroxine, Serum: AM Sched. Collection: 28-Jun-2022 06:00 (06-27 @ 14:03)  Thyroid Stimulating Hormone, Serum: AM Sched. Collection: 28-Jun-2022 06:00  Cancel Reason: Lab Operations Cancel (06-27 @ 14:03)  A1C with Estimated Average Glucose: Routine  Cancel Reason: Lab Operations Cancel (06-27 @ 14:03)           Chief complaint  Patient is a 88y old  Female who presents with a chief complaint of syncope/ ?  seizures (05 Jul 2022 13:40)   Review of systems  Patient in bed, looks comfortable, no hypoglycemic episodes.    Labs and Fingersticks  CAPILLARY BLOOD GLUCOSE      POCT Blood Glucose.: 166 mg/dL (05 Jul 2022 13:33)  POCT Blood Glucose.: 266 mg/dL (05 Jul 2022 11:15)  POCT Blood Glucose.: 201 mg/dL (05 Jul 2022 08:47)  POCT Blood Glucose.: 239 mg/dL (04 Jul 2022 21:36)  POCT Blood Glucose.: 152 mg/dL (04 Jul 2022 17:21)      Anion Gap, Serum: 10 (07-05 @ 06:44)      Calcium, Total Serum: 8.6 (07-05 @ 06:44)          07-05    141  |  105  |  16  ----------------------------<  210<H>  4.2   |  26  |  0.68    Ca    8.6      05 Jul 2022 06:44                          9.8    5.95  )-----------( 178      ( 05 Jul 2022 06:45 )             31.0     Medications  MEDICATIONS  (STANDING):  allopurinol 100 milliGRAM(s) Oral daily  amLODIPine   Tablet 5 milliGRAM(s) Oral daily  aspirin enteric coated 81 milliGRAM(s) Oral daily  atorvastatin 40 milliGRAM(s) Oral at bedtime  buPROPion XL (24-Hour) . 150 milliGRAM(s) Oral daily  cephalexin 500 milliGRAM(s) Oral every 12 hours  dextrose 5%. 1000 milliLiter(s) (50 mL/Hr) IV Continuous <Continuous>  dextrose 5%. 1000 milliLiter(s) (100 mL/Hr) IV Continuous <Continuous>  dextrose 50% Injectable 25 Gram(s) IV Push once  dextrose 50% Injectable 12.5 Gram(s) IV Push once  dextrose 50% Injectable 25 Gram(s) IV Push once  escitalopram 10 milliGRAM(s) Oral daily  glucagon  Injectable 1 milliGRAM(s) IntraMuscular once  heparin   Injectable 5000 Unit(s) SubCutaneous every 12 hours  insulin glargine Injectable (LANTUS) 10 Unit(s) SubCutaneous at bedtime  insulin lispro (ADMELOG) corrective regimen sliding scale   SubCutaneous three times a day before meals  insulin lispro (ADMELOG) corrective regimen sliding scale   SubCutaneous at bedtime  insulin lispro Injectable (ADMELOG) 7 Unit(s) SubCutaneous three times a day before meals  lisinopril 40 milliGRAM(s) Oral daily  metoprolol succinate  milliGRAM(s) Oral daily  pantoprazole    Tablet 40 milliGRAM(s) Oral before breakfast      Physical Exam  General: Patient comfortable in bed  Vital Signs Last 12 Hrs  T(F): 97.8 (07-05-22 @ 11:16), Max: 98.3 (07-05-22 @ 04:12)  HR: 60 (07-05-22 @ 13:15) (57 - 72)  BP: 142/58 (07-05-22 @ 13:15) (133/67 - 157/61)  BP(mean): 84 (07-05-22 @ 13:15) (84 - 89)  RR: 16 (07-05-22 @ 13:15) (16 - 18)  SpO2: 98% (07-05-22 @ 13:15) (96% - 98%)  Neck: No palpable thyroid nodules.  CVS: S1S2, No murmurs  Respiratory: No wheezing, no crepitations  GI: Abdomen soft, bowel sounds positive  Musculoskeletal:  edema lower extremities.   Skin: No skin rashes, no ecchymosis    Diagnostics    A1C with Estimated Average Glucose: Routine  Cancel Reason: Lab Operations Cancel (06-28 @ 13:18)  Free Thyroxine, Serum: AM Sched. Collection: 28-Jun-2022 06:00 (06-27 @ 14:03)  Thyroid Stimulating Hormone, Serum: AM Sched. Collection: 28-Jun-2022 06:00  Cancel Reason: Lab Operations Cancel (06-27 @ 14:03)  A1C with Estimated Average Glucose: Routine  Cancel Reason: Lab Operations Cancel (06-27 @ 14:03)

## 2022-07-05 NOTE — PROGRESS NOTE ADULT - ASSESSMENT
Assessment  DMT2: 88y Female with DM T2 with hyperglycemia, A1C 7.6%, was on oral meds/Janumet at home, now on basal bolus insulin, blood sugars are fluctuating/running high and not at target, no hypoglycemic episodes, eating meals, NAD.  Syncope: AM cortisol WNL, workup in progress, monitored.  HTN: Controlled,  on antihypertensive medications.  HLD: On statin.      Jaspal Augustine MD  Cell: [phone]  Office: [phone]       Assessment  DMT2: 88y Female with DM T2 with hyperglycemia, A1C 7.6%, was on oral meds/Janumet at home, now on basal bolus insulin, blood sugars are fluctuating/running high and  not at target, no hypoglycemic episodes, eating meals, NAD.  Syncope: AM cortisol WNL, workup in progress, monitored.  HTN: Controlled,  on antihypertensive medications.  HLD: On statin.      Jaspal Augustine MD  Cell: [phone]  Office: [phone]

## 2022-07-05 NOTE — PROGRESS NOTE ADULT - ASSESSMENT
87 YO F with PMH of  DM, HTN, HLD, CAD present to ED 6/27/22 s/p syncopal episode with multiple episodes of epistaxis. Pt. was originally treated with direct pressure which controlled the bleeding. Pt. rebled and required cauterization of anterior nasal septum and packed with xeroform packing that was removed 7/2. Pt. bled again this morning and required no packing.

## 2022-07-05 NOTE — PROGRESS NOTE ADULT - ASSESSMENT
88 year old RH female      h/o HTN, HLD, DM, CAD with stents     who is presenting as a transfer from [place] for evaluation of possible seizure episode./  and  s/p  syncope   LOC episode with prodrome of darkening of vision, sweating, nausea with history of  cad, need  to  r/o  cardiac  causes   per  neuro,  no  seizures meds    tele.  r/o  arrythmia   echo/  card  dr hudson jane/ melissa   HYN/ HLD  on  asa.  lisinoril,  toprol   CAD/  stents, on asa./ plavix/  lipitor   DM,  follow  fs, on  janumet   depression, on bupropion,  lexapro  on  dvt  ppx   +  orthostatic, was  on iv fluids    echo, normal  ef/ card   hudson jane/  ep dr hudson jane   anemia,  guaiac  +/   ?  from her epistaxis ,    hb stable    s/p epistaxis  . reoved  nasal packing  by  ent/  eeg. no  seizures  noted,    stress  test  is +/,   still orthostatic/ cath today

## 2022-07-06 ENCOUNTER — TRANSCRIPTION ENCOUNTER (OUTPATIENT)
Age: 87
End: 2022-07-06

## 2022-07-06 VITALS — RESPIRATION RATE: 18 BRPM | OXYGEN SATURATION: 96 % | TEMPERATURE: 98 F

## 2022-07-06 LAB
ANION GAP SERPL CALC-SCNC: 11 MMOL/L — SIGNIFICANT CHANGE UP (ref 5–17)
ANION GAP SERPL CALC-SCNC: 9 MMOL/L — SIGNIFICANT CHANGE UP (ref 5–17)
BUN SERPL-MCNC: 17 MG/DL — SIGNIFICANT CHANGE UP (ref 7–23)
BUN SERPL-MCNC: 19 MG/DL — SIGNIFICANT CHANGE UP (ref 7–23)
CALCIUM SERPL-MCNC: 8.8 MG/DL — SIGNIFICANT CHANGE UP (ref 8.4–10.5)
CALCIUM SERPL-MCNC: 8.8 MG/DL — SIGNIFICANT CHANGE UP (ref 8.4–10.5)
CHLORIDE SERPL-SCNC: 102 MMOL/L — SIGNIFICANT CHANGE UP (ref 96–108)
CHLORIDE SERPL-SCNC: 102 MMOL/L — SIGNIFICANT CHANGE UP (ref 96–108)
CO2 SERPL-SCNC: 26 MMOL/L — SIGNIFICANT CHANGE UP (ref 22–31)
CO2 SERPL-SCNC: 26 MMOL/L — SIGNIFICANT CHANGE UP (ref 22–31)
CREAT SERPL-MCNC: 0.64 MG/DL — SIGNIFICANT CHANGE UP (ref 0.5–1.3)
CREAT SERPL-MCNC: 0.68 MG/DL — SIGNIFICANT CHANGE UP (ref 0.5–1.3)
EGFR: 84 ML/MIN/1.73M2 — SIGNIFICANT CHANGE UP
EGFR: 85 ML/MIN/1.73M2 — SIGNIFICANT CHANGE UP
GLUCOSE BLDC GLUCOMTR-MCNC: 210 MG/DL — HIGH (ref 70–99)
GLUCOSE BLDC GLUCOMTR-MCNC: 287 MG/DL — HIGH (ref 70–99)
GLUCOSE SERPL-MCNC: 171 MG/DL — HIGH (ref 70–99)
GLUCOSE SERPL-MCNC: 195 MG/DL — HIGH (ref 70–99)
MAGNESIUM SERPL-MCNC: 1.9 MG/DL — SIGNIFICANT CHANGE UP (ref 1.6–2.6)
POTASSIUM SERPL-MCNC: 4.2 MMOL/L — SIGNIFICANT CHANGE UP (ref 3.5–5.3)
POTASSIUM SERPL-MCNC: 4.3 MMOL/L — SIGNIFICANT CHANGE UP (ref 3.5–5.3)
POTASSIUM SERPL-SCNC: 4.2 MMOL/L — SIGNIFICANT CHANGE UP (ref 3.5–5.3)
POTASSIUM SERPL-SCNC: 4.3 MMOL/L — SIGNIFICANT CHANGE UP (ref 3.5–5.3)
SODIUM SERPL-SCNC: 137 MMOL/L — SIGNIFICANT CHANGE UP (ref 135–145)
SODIUM SERPL-SCNC: 139 MMOL/L — SIGNIFICANT CHANGE UP (ref 135–145)

## 2022-07-06 PROCEDURE — 84484 ASSAY OF TROPONIN QUANT: CPT

## 2022-07-06 PROCEDURE — 93017 CV STRESS TEST TRACING ONLY: CPT

## 2022-07-06 PROCEDURE — 36415 COLL VENOUS BLD VENIPUNCTURE: CPT

## 2022-07-06 PROCEDURE — 84439 ASSAY OF FREE THYROXINE: CPT

## 2022-07-06 PROCEDURE — 85730 THROMBOPLASTIN TIME PARTIAL: CPT

## 2022-07-06 PROCEDURE — 95711 VEEG 2-12 HR UNMONITORED: CPT

## 2022-07-06 PROCEDURE — 82272 OCCULT BLD FECES 1-3 TESTS: CPT

## 2022-07-06 PROCEDURE — 85025 COMPLETE CBC W/AUTO DIFF WBC: CPT

## 2022-07-06 PROCEDURE — 93454 CORONARY ARTERY ANGIO S&I: CPT

## 2022-07-06 PROCEDURE — 95714 VEEG EA 12-26 HR UNMNTR: CPT

## 2022-07-06 PROCEDURE — 87086 URINE CULTURE/COLONY COUNT: CPT

## 2022-07-06 PROCEDURE — 97161 PT EVAL LOW COMPLEX 20 MIN: CPT

## 2022-07-06 PROCEDURE — 93306 TTE W/DOPPLER COMPLETE: CPT

## 2022-07-06 PROCEDURE — 97116 GAIT TRAINING THERAPY: CPT

## 2022-07-06 PROCEDURE — C1769: CPT

## 2022-07-06 PROCEDURE — 82962 GLUCOSE BLOOD TEST: CPT

## 2022-07-06 PROCEDURE — U0003: CPT

## 2022-07-06 PROCEDURE — 99285 EMERGENCY DEPT VISIT HI MDM: CPT | Mod: 25

## 2022-07-06 PROCEDURE — 85027 COMPLETE CBC AUTOMATED: CPT

## 2022-07-06 PROCEDURE — 95700 EEG CONT REC W/VID EEG TECH: CPT

## 2022-07-06 PROCEDURE — 71045 X-RAY EXAM CHEST 1 VIEW: CPT

## 2022-07-06 PROCEDURE — C1887: CPT

## 2022-07-06 PROCEDURE — A9500: CPT

## 2022-07-06 PROCEDURE — U0005: CPT

## 2022-07-06 PROCEDURE — 99232 SBSQ HOSP IP/OBS MODERATE 35: CPT

## 2022-07-06 PROCEDURE — 99282 EMERGENCY DEPT VISIT SF MDM: CPT

## 2022-07-06 PROCEDURE — 78452 HT MUSCLE IMAGE SPECT MULT: CPT

## 2022-07-06 PROCEDURE — 84443 ASSAY THYROID STIM HORMONE: CPT

## 2022-07-06 PROCEDURE — 83036 HEMOGLOBIN GLYCOSYLATED A1C: CPT

## 2022-07-06 PROCEDURE — 80053 COMPREHEN METABOLIC PANEL: CPT

## 2022-07-06 PROCEDURE — 80048 BASIC METABOLIC PNL TOTAL CA: CPT

## 2022-07-06 PROCEDURE — 97110 THERAPEUTIC EXERCISES: CPT

## 2022-07-06 PROCEDURE — 82533 TOTAL CORTISOL: CPT

## 2022-07-06 PROCEDURE — 85610 PROTHROMBIN TIME: CPT

## 2022-07-06 PROCEDURE — C1894: CPT

## 2022-07-06 PROCEDURE — 81001 URINALYSIS AUTO W/SCOPE: CPT

## 2022-07-06 PROCEDURE — 83735 ASSAY OF MAGNESIUM: CPT

## 2022-07-06 RX ORDER — AMLODIPINE BESYLATE 10 MG/1
1 TABLET ORAL
Qty: 30 | Refills: 0 | DISCHARGE
Start: 2022-07-06 | End: 2022-08-04

## 2022-07-06 RX ORDER — LISINOPRIL 2.5 MG/1
1 TABLET ORAL
Qty: 30 | Refills: 0
Start: 2022-07-06 | End: 2022-08-04

## 2022-07-06 RX ORDER — CLOPIDOGREL BISULFATE 75 MG/1
1 TABLET, FILM COATED ORAL
Qty: 0 | Refills: 0 | DISCHARGE

## 2022-07-06 RX ORDER — METOPROLOL TARTRATE 50 MG
1 TABLET ORAL
Qty: 30 | Refills: 0
Start: 2022-07-06 | End: 2022-08-04

## 2022-07-06 RX ORDER — METOPROLOL TARTRATE 50 MG
1 TABLET ORAL
Qty: 0 | Refills: 0 | DISCHARGE

## 2022-07-06 RX ORDER — AMLODIPINE BESYLATE 2.5 MG/1
1 TABLET ORAL
Qty: 30 | Refills: 0
Start: 2022-07-06 | End: 2022-08-04

## 2022-07-06 RX ORDER — PANTOPRAZOLE SODIUM 20 MG/1
1 TABLET, DELAYED RELEASE ORAL
Qty: 30 | Refills: 0
Start: 2022-07-06 | End: 2022-08-04

## 2022-07-06 RX ORDER — AMLODIPINE BESYLATE 2.5 MG/1
1 TABLET ORAL
Qty: 0 | Refills: 0 | DISCHARGE

## 2022-07-06 RX ORDER — BACITRACIN ZINC 500 UNIT/G
1 OINTMENT IN PACKET (EA) TOPICAL
Qty: 0 | Refills: 0 | DISCHARGE
Start: 2022-07-06

## 2022-07-06 RX ORDER — LISINOPRIL 2.5 MG/1
1 TABLET ORAL
Qty: 0 | Refills: 0 | DISCHARGE

## 2022-07-06 RX ORDER — SODIUM CHLORIDE 0.65 %
1 AEROSOL, SPRAY (ML) NASAL
Qty: 0 | Refills: 0 | DISCHARGE
Start: 2022-07-06

## 2022-07-06 RX ADMIN — LISINOPRIL 40 MILLIGRAM(S): 2.5 TABLET ORAL at 05:30

## 2022-07-06 RX ADMIN — Medication 1 APPLICATION(S): at 05:30

## 2022-07-06 RX ADMIN — Medication 500 MILLIGRAM(S): at 05:29

## 2022-07-06 RX ADMIN — AMLODIPINE BESYLATE 5 MILLIGRAM(S): 2.5 TABLET ORAL at 05:29

## 2022-07-06 RX ADMIN — BUPROPION HYDROCHLORIDE 150 MILLIGRAM(S): 150 TABLET, EXTENDED RELEASE ORAL at 09:03

## 2022-07-06 RX ADMIN — Medication 7 UNIT(S): at 09:04

## 2022-07-06 RX ADMIN — Medication 81 MILLIGRAM(S): at 09:03

## 2022-07-06 RX ADMIN — ESCITALOPRAM OXALATE 10 MILLIGRAM(S): 10 TABLET, FILM COATED ORAL at 09:03

## 2022-07-06 RX ADMIN — Medication 100 MILLIGRAM(S): at 09:03

## 2022-07-06 RX ADMIN — PANTOPRAZOLE SODIUM 40 MILLIGRAM(S): 20 TABLET, DELAYED RELEASE ORAL at 05:30

## 2022-07-06 RX ADMIN — Medication 1 SPRAY(S): at 05:30

## 2022-07-06 RX ADMIN — HEPARIN SODIUM 5000 UNIT(S): 5000 INJECTION INTRAVENOUS; SUBCUTANEOUS at 05:29

## 2022-07-06 RX ADMIN — Medication 2: at 09:04

## 2022-07-06 RX ADMIN — Medication 100 MILLIGRAM(S): at 05:29

## 2022-07-06 RX ADMIN — Medication 7 UNIT(S): at 13:18

## 2022-07-06 RX ADMIN — Medication 3: at 13:18

## 2022-07-06 NOTE — PROGRESS NOTE ADULT - NS ATTEND AMEND GEN_ALL_CORE FT
88yF with epistaxis and spitting of blood from mouth  adm for workup for syncopy  VSS  GI procedures refused by Pt and daughter  agree with DC plan
Agree with above note. Briefly, Pt. is a 87 yo female with PMHX DM, HTN, HLD, CAD present to ED 6/27/22 s/p syncopal episode. Noted to have significant epistaxis 6/26 (bleeding from nose, spit blood through mouth and swallowed blood per pt) seen in ED 6/26  now noted to have dark BM FOBT + 6/28/22 PM  BMs prior to admission were brown  Remote history of PUD ([facility] 2 years ago)    We are consulted for FOBT + anemia - suspect 2/2 passage of swallowed blood from GI tract; prior to epistaxis (while on DAPT) stools have been brown  Hgb/HD remains stable though slight drop from admission  Pt very adamant about not wanting any repeat endoscopies  [facility] records obtained/reviewed : EGD 10/16/2020  Grade A reflux esophagitis, 3 linear non bleeding esophageal ulcers, 2 non bleeding gastric ulcers, normal duodenum  Reccs:  -PO PPI once a day  -monitor BMs; expect stools to become progressively lighter/more brown over next 1-3 stools  -monitor CBC  -PO diet as tolerated    Jose J Wood MD  [facility]
pt reported episode of bleeding. no bleeding noted on exam. continue nasal moisturization and f/u as outpatient to recheck prior to her flight to [place].
right anterior septal bleeding. controlled with cautery and packing. will follow.
Patient seen and examined today at bedside. Chart, labs, vitals, radiology reviewed. Above H&P reviewed and edited where appropriate. Agree with history and physical exam. Agree with assessment and plan. I reviewed the overnight course of events and discussed the care with the patient and their family  35 minutes spent on total encounter of which more than fifty percent of the encounter was spent counseling and/or coordinating care by the attending physician.

## 2022-07-06 NOTE — CHART NOTE - NSCHARTNOTEFT_GEN_A_CORE
MEDICINE NP    ANIKA CHISHOLM  88y Female    Patient is a 88y old  Female who presents with a chief complaint of syncope/ ?  seizures (05 Jul 2022 15:17)         > Event Summary:   Notified by RN, Patient with PAT's 130's x5secs, asymp.    -STAT BMP / lytes wnl.   -C/w Toprol xl  -F/u Cardiology in AM  -Will endorse to Day Provider in AM and Attending to follow  07-06    137  |  102  |  19  ----------------------------<  171<H>  4.3   |  26  |  0.68    Ca    8.8      06 Jul 2022 00:02  Mg     1.9     07-06            -Vital Signs Last 24 Hrs  T(C): 36.3 (05 Jul 2022 22:25), Max: 36.8 (05 Jul 2022 04:12)  T(F): 97.3 (05 Jul 2022 22:25), Max: 98.3 (05 Jul 2022 04:12)  HR: 68 (05 Jul 2022 22:25) (55 - 72)  BP: 168/62 (05 Jul 2022 22:25) (132/69 - 179/66)  BP(mean): 90 (05 Jul 2022 15:15) (84 - 97)  RR: 20 (05 Jul 2022 22:25) (16 - 20)  SpO2: 97% (05 Jul 2022 22:25) (94% - 98%)      Luly Watson, SANDEEP-BC  Medicine Department  #[id number]

## 2022-07-06 NOTE — PROVIDER CONTACT NOTE (OTHER) - RECOMMENDATIONS
stop IVF?
provider made aware, okay not to administer insulin
Assess patient. Review patient's orders, labs, history & plan. Address patient's nosebleed.
Repeat labs
check stool for occult blood
PA to assess pt at bedside

## 2022-07-06 NOTE — PROGRESS NOTE ADULT - SUBJECTIVE AND OBJECTIVE BOX
Chief complaint  Patient is a 88y old  Female who presents with a chief complaint of syncope/ ?  seizures (06 Jul 2022 09:27)   Review of systems  Patient in bed, looks comfortable, no hypoglycemic episodes. DC planning.    Labs and Fingersticks  CAPILLARY BLOOD GLUCOSE      POCT Blood Glucose.: 210 mg/dL (06 Jul 2022 08:42)  POCT Blood Glucose.: 212 mg/dL (05 Jul 2022 21:18)  POCT Blood Glucose.: 125 mg/dL (05 Jul 2022 17:04)  POCT Blood Glucose.: 166 mg/dL (05 Jul 2022 13:33)      Anion Gap, Serum: 11 (07-06 @ 07:16)  Anion Gap, Serum: 9 (07-06 @ 00:02)  Anion Gap, Serum: 10 (07-05 @ 06:44)      Calcium, Total Serum: 8.8 (07-06 @ 07:16)  Calcium, Total Serum: 8.8 (07-06 @ 00:02)  Calcium, Total Serum: 8.6 (07-05 @ 06:44)          07-06    139  |  102  |  17  ----------------------------<  195<H>  4.2   |  26  |  0.64    Ca    8.8      06 Jul 2022 07:16  Mg     1.9     07-06                          9.8    5.95  )-----------( 178      ( 05 Jul 2022 06:45 )             31.0     Medications  MEDICATIONS  (STANDING):  allopurinol 100 milliGRAM(s) Oral daily  amLODIPine   Tablet 5 milliGRAM(s) Oral daily  aspirin enteric coated 81 milliGRAM(s) Oral daily  atorvastatin 40 milliGRAM(s) Oral at bedtime  BACItracin   Ointment 1 Application(s) Topical two times a day  buPROPion XL (24-Hour) . 150 milliGRAM(s) Oral daily  cephalexin 500 milliGRAM(s) Oral every 12 hours  dextrose 5%. 1000 milliLiter(s) (50 mL/Hr) IV Continuous <Continuous>  dextrose 5%. 1000 milliLiter(s) (100 mL/Hr) IV Continuous <Continuous>  dextrose 50% Injectable 25 Gram(s) IV Push once  dextrose 50% Injectable 12.5 Gram(s) IV Push once  dextrose 50% Injectable 25 Gram(s) IV Push once  escitalopram 10 milliGRAM(s) Oral daily  glucagon  Injectable 1 milliGRAM(s) IntraMuscular once  heparin   Injectable 5000 Unit(s) SubCutaneous every 12 hours  insulin glargine Injectable (LANTUS) 10 Unit(s) SubCutaneous at bedtime  insulin lispro (ADMELOG) corrective regimen sliding scale   SubCutaneous three times a day before meals  insulin lispro (ADMELOG) corrective regimen sliding scale   SubCutaneous at bedtime  insulin lispro Injectable (ADMELOG) 7 Unit(s) SubCutaneous three times a day before meals  lisinopril 40 milliGRAM(s) Oral daily  metoprolol succinate  milliGRAM(s) Oral daily  pantoprazole    Tablet 40 milliGRAM(s) Oral before breakfast  sodium chloride 0.65% Nasal 1 Spray(s) Both Nostrils two times a day      Physical Exam  General: Patient comfortable in bed  Vital Signs Last 12 Hrs  T(F): 97.7 (07-06-22 @ 04:31), Max: 97.7 (07-06-22 @ 04:31)  HR: 72 (07-06-22 @ 04:31) (72 - 72)  BP: 156/64 (07-06-22 @ 04:31) (156/64 - 156/64)  BP(mean): --  RR: 18 (07-06-22 @ 04:31) (18 - 18)  SpO2: 99% (07-06-22 @ 04:31) (99% - 99%)  Neck: No palpable thyroid nodules.  CVS: S1S2, No murmurs  Respiratory: No wheezing, no crepitations  GI: Abdomen soft, bowel sounds positive  Musculoskeletal:  edema lower extremities.   Skin: No skin rashes, no ecchymosis    Diagnostics    A1C with Estimated Average Glucose: Routine  Cancel Reason: Lab Operations Cancel (06-28 @ 13:18)  Free Thyroxine, Serum: AM Sched. Collection: 28-Jun-2022 06:00 (06-27 @ 14:03)  Thyroid Stimulating Hormone, Serum: AM Sched. Collection: 28-Jun-2022 06:00  Cancel Reason: Lab Operations Cancel (06-27 @ 14:03)  A1C with Estimated Average Glucose: Routine  Cancel Reason: Lab Operations Cancel (06-27 @ 14:03)

## 2022-07-06 NOTE — PROGRESS NOTE ADULT - SUBJECTIVE AND OBJECTIVE BOX
afberile    REVIEW OF SYSTEMS:  GEN: no fever,    no chills  RESP: no SOB,   no cough  CVS: no chest pain,   no palpitations  GI: no abdominal pain,   no nausea,   no vomiting,   no constipation,   no diarrhea  : no dysuria,   no frequency  NEURO: no headache,   no dizziness  PSYCH: no depression,   not anxious  Derm : no rash    MEDICATIONS  (STANDING):  allopurinol 100 milliGRAM(s) Oral daily  amLODIPine   Tablet 5 milliGRAM(s) Oral daily  aspirin enteric coated 81 milliGRAM(s) Oral daily  atorvastatin 40 milliGRAM(s) Oral at bedtime  BACItracin   Ointment 1 Application(s) Topical two times a day  buPROPion XL (24-Hour) . 150 milliGRAM(s) Oral daily  cephalexin 500 milliGRAM(s) Oral every 12 hours  dextrose 5%. 1000 milliLiter(s) (50 mL/Hr) IV Continuous <Continuous>  dextrose 5%. 1000 milliLiter(s) (100 mL/Hr) IV Continuous <Continuous>  dextrose 50% Injectable 25 Gram(s) IV Push once  dextrose 50% Injectable 12.5 Gram(s) IV Push once  dextrose 50% Injectable 25 Gram(s) IV Push once  escitalopram 10 milliGRAM(s) Oral daily  glucagon  Injectable 1 milliGRAM(s) IntraMuscular once  heparin   Injectable 5000 Unit(s) SubCutaneous every 12 hours  insulin glargine Injectable (LANTUS) 10 Unit(s) SubCutaneous at bedtime  insulin lispro (ADMELOG) corrective regimen sliding scale   SubCutaneous three times a day before meals  insulin lispro (ADMELOG) corrective regimen sliding scale   SubCutaneous at bedtime  insulin lispro Injectable (ADMELOG) 7 Unit(s) SubCutaneous three times a day before meals  lisinopril 40 milliGRAM(s) Oral daily  metoprolol succinate  milliGRAM(s) Oral daily  pantoprazole    Tablet 40 milliGRAM(s) Oral before breakfast  sodium chloride 0.65% Nasal 1 Spray(s) Both Nostrils two times a day    MEDICATIONS  (PRN):  dextrose Oral Gel 15 Gram(s) Oral once PRN Blood Glucose LESS THAN 70 milliGRAM(s)/deciliter      Vital Signs Last 24 Hrs  T(C): 36.5 (06 Jul 2022 04:31), Max: 36.7 (05 Jul 2022 15:45)  T(F): 97.7 (06 Jul 2022 04:31), Max: 98 (05 Jul 2022 15:45)  HR: 72 (06 Jul 2022 04:31) (55 - 72)  BP: 156/64 (06 Jul 2022 04:31) (132/69 - 179/66)  BP(mean): 90 (05 Jul 2022 15:15) (84 - 97)  RR: 18 (06 Jul 2022 04:31) (16 - 20)  SpO2: 99% (06 Jul 2022 04:31) (94% - 99%)  CAPILLARY BLOOD GLUCOSE      POCT Blood Glucose.: 210 mg/dL (06 Jul 2022 08:42)  POCT Blood Glucose.: 212 mg/dL (05 Jul 2022 21:18)  POCT Blood Glucose.: 125 mg/dL (05 Jul 2022 17:04)  POCT Blood Glucose.: 166 mg/dL (05 Jul 2022 13:33)  POCT Blood Glucose.: 266 mg/dL (05 Jul 2022 11:15)    I&O's Summary    05 Jul 2022 07:01  -  06 Jul 2022 07:00  --------------------------------------------------------  IN: 440 mL / OUT: 0 mL / NET: 440 mL        PHYSICAL EXAM:  HEAD:  Atraumatic, Normocephalic  NECK: Supple, No   JVD  CHEST/LUNG:   no     rales,     no,    rhonchi  HEART: Regular rate and rhythm;         murmur  ABDOMEN: Soft, Nontender, ;   EXTREMITIES:  no      edema  NEUROLOGY:  alert    LABS:                        9.8    5.95  )-----------( 178      ( 05 Jul 2022 06:45 )             31.0     07-06    139  |  102  |  17  ----------------------------<  195<H>  4.2   |  26  |  0.64    Ca    8.8      06 Jul 2022 07:16  Mg     1.9     07-06                      Thyroid Stimulating Hormone, Serum: 2.47 uIU/mL (06-28 @ 07:19)          Consultant(s) Notes Reviewed:      Care Discussed with Consultants/Other Providers:

## 2022-07-06 NOTE — PROVIDER CONTACT NOTE (OTHER) - REASON
Large epistaxis
pt hypertensive, see FS
patient glucose of 70
Nosebleed.
Pt had 5 beats of wide QRS complex on tele
pt c/o black color stool
pt had episode of PAT w/ HR up to 130, for 5 seconds

## 2022-07-06 NOTE — PROVIDER CONTACT NOTE (OTHER) - DATE AND TIME:
03-Jul-2022 13:30
30-Jun-2022 00:40
30-Jun-2022 17:30
05-Jul-2022 08:24
05-Jul-2022 14:25
05-Jul-2022 22:30
28-Jun-2022 21:15

## 2022-07-06 NOTE — PROGRESS NOTE ADULT - REASON FOR ADMISSION
syncope/ ?  seizures

## 2022-07-06 NOTE — PROGRESS NOTE ADULT - PROBLEM SELECTOR PLAN 4
Will continue statin, primary team FU.

## 2022-07-06 NOTE — PROGRESS NOTE ADULT - ASSESSMENT
Assessment  DMT2: 88y Female with DM T2 with hyperglycemia, A1C 7.6%, was on oral meds/Janumet at home, now on basal bolus insulin, increased dose yesterday, blood sugars are fluctuating with intermittent elevations, no hypoglycemic episodes, eating meals, NAD, DC planning.  Syncope: AM cortisol WNL, workup in progress, monitored.  HTN: Controlled,  on antihypertensive medications.  HLD: On statin.      Jaspal Augustine MD  Cell: [phone]  Office: [phone]       Assessment  DMT2: 88y Female with DM T2 with hyperglycemia, A1C 7.6%, was on oral meds/Janumet at home, now on basal bolus insulin, increased dose yesterday, blood sugars are fluctuating with  intermittent elevations, no hypoglycemic episodes, eating meals, NAD, DC planning.  Syncope: AM cortisol WNL, workup in progress, monitored.  HTN: Controlled,  on antihypertensive medications.  HLD: On statin.      Jaspal Augustine MD  Cell: [phone]  Office: [phone]

## 2022-07-06 NOTE — DISCHARGE NOTE PROVIDER - NSDCCPCAREPLAN_GEN_ALL_CORE_FT
PRINCIPAL DISCHARGE DIAGNOSIS  Diagnosis: Syncope  Assessment and Plan of Treatment:       SECONDARY DISCHARGE DIAGNOSES  Diagnosis: CAD (coronary artery disease)  Assessment and Plan of Treatment:     Diagnosis: Epistaxis  Assessment and Plan of Treatment:      PRINCIPAL DISCHARGE DIAGNOSIS  Diagnosis: Syncope  Assessment and Plan of Treatment: Work up negative.  Follow up with your PMD within one week of discharge.      SECONDARY DISCHARGE DIAGNOSES  Diagnosis: CAD (coronary artery disease)  Assessment and Plan of Treatment: Stent patent. Continue to take your Aspirin as prescribed.  Currently off of plavix due to nose bleed.  Follow up with your cardiology regarding potential resumption.    Diagnosis: Diabetes mellitus  Assessment and Plan of Treatment: Take your janumet as prescribed and follow up with Endocrinology in 3 months.    Diagnosis: Epistaxis  Assessment and Plan of Treatment: Resolved.  Continue to use saline spray 4x daily.  Follow up with your ENT as outpatient.    Diagnosis: Hypertension  Assessment and Plan of Treatment: Continue to take your BP medication as prescribed.

## 2022-07-06 NOTE — DIETITIAN INITIAL EVALUATION ADULT - ORAL INTAKE PTA/DIET HISTORY
cereal, milk, toast, diet jelly, berries for breakfast. leftovers for lunch. fish, chicken and vegetables for dinner.

## 2022-07-06 NOTE — DISCHARGE NOTE PROVIDER - PROVIDER TOKENS
PROVIDER:[TOKEN:[[id number]]] PROVIDER:[TOKEN:[[id number]]],PROVIDER:[TOKEN:[[id number]]],PROVIDER:[TOKEN:[[id number]]]

## 2022-07-06 NOTE — PROVIDER CONTACT NOTE (OTHER) - ASSESSMENT
Patient noted to be bleeding a small amount out of her Right Naris. Patient states that she had a nosebleed at home and in the hospital during this admission.
Pt A&Ox4, /77, HR 59, spo2 98% on RA, RR16. Pt denies cp/pain/palpitations. Rt groin dx cath site benign, soft nontender, no bleeding no swelling & no pain at cath site.
Pt had black color stool. VS stable
pt asymptomatic. pt denied chest pain or dizziness. pt denied any pain. pt denied feeling lightheaded. pt vitals were BP Manual 168/62, HR 68, RR 20, Temp 97.3, O2 97%.
Pt is A&Ox4, denies chest pain, SOB, dizziness, lightheadedness, palpitations. VSS. HOB placed 90 degrees. Epistaxis present in right nare, continuing to bleed despite pressure and cold application.
See FS for orthostatic BP reassessment on IVF NS @ 70 ml/hr; asymptomatic. On Toprol XL 100mg
Pt A&O X4, asymptomatic

## 2022-07-06 NOTE — DISCHARGE NOTE PROVIDER - HOSPITAL COURSE
88 year old RH female with a past medical history of HTN, HLD, DM, CAD with stent who presented on 6/27/22 as a transfer from [place] for evaluation of possible seizure episode.    At baseline, the patient is ambulatory with a cane when she goes outside, lives alone and performs her own ADLs. The patient had an episode of nosebleeding for which she first presented to Urgent Care and then went to [facility] ED where her bleeding spontaneously stopped and she was discharged home. She then went home and was sitting down and eating a pastry when she told her daughter who was present that the patient felt like she was going to faint. The patient stated she had symptoms of her vision darkening, a symptom of epigastric discomfort and nausea, sweating and then had lost consciousness. As per the daughter, the patient began to lean back, her eyes rolled back, and to avoid falling backwards and vomiting was pushed forward by the daughter. The episode lasted several seconds and then the patient regained consciousness. EMS was called.   The patient then seemed slightly disoriented  which lasted several minutes and had completely resolved by the time that EMS had arrived. It was noted that there was some liquid by the patient which was concerning for urinary incontinence however it unclear as the patient stated that her undergarments and dress were dry. The patient was then brought to the ED at [place] and was to undergo cardiac workup and was transferred to [facility] as there was no EEG monitoring. Currently, the patient states that she feels well and denied headaches, visual changes, auditory changes, numbness, tingling, weakness.  Patient notes that in the past she had a fall several months ago while she was ambulating and thinks that she may have lost consciousness during that time.  Patient stated that in the past when she was 10 years old, she was diagnosed with seizures. These episodes were marked by not being able to speak for a brief period of time and disorientation. Stated that she feels that she might remember these episodes but was unsure. She denied taking medications for these events and noted that when she underwent menarche, these episodes stopped and she has not had any since. Is taking Wellbutrin and has been taking the same dose for 16 years as per daughter. Patient NIHSS and MRS score is 0.    Admission consisted of:    Syncope, likely vagal in nature  Cardiology consulted who believes syncopal event could have been vagally mediated but with left bundle branch block  Admit to telemetry  CT head negative for any acute pathology  Obtain echo which shows hyperdynamic LV function  Electrophysiology consulted who recommended possible ischemia workup, noted patient has severe orthostatic hypotension which has improved with IV hydration,  cortisol levels ordered which were normal  EEG was performed which was normal. Neurology states syncope not related to seizure activity  Nuclear stress test performed on 7/3/22 abnormal with multiple defects in several vasc territories. Cath scheduled to define anatomy and need for further intervention on 7/5/22.  Cardiac cath performed which showed non-obstructive CAD.  Fecal occult blood positive  Patient with one dark stool  Epistaxis noted   GI consulted- FOBT positive likely due to previous epistaxis, no intervention needed besides monitoring for GI bleed and CBC monitoring  Epistaxis  Secondary to right nares lesion and coagulopathy due to ASA Plavix and SQ Heparin  ENT consulted   Lesion cauterized and Nasal tamponade placed   Plavix stopped at time  Packing removed on 7/2/22   88 year old RH female with a past medical history of HTN, HLD, DM, CAD with stent who presented on 6/27/22 as a transfer from [place] for evaluation of possible seizure episode.    At baseline, the patient is ambulatory with a cane when she goes outside, lives alone and performs her own ADLs. The patient had an episode of nosebleeding for which she first presented to Urgent Care and then went to [facility] ED where her bleeding spontaneously stopped and she was discharged home. She then went home and was sitting down and eating a pastry when she told her daughter who was present that the patient felt like she was going to faint. The patient stated she had symptoms of her vision darkening, a symptom of epigastric discomfort and nausea, sweating and then had lost consciousness. As per the daughter, the patient began to lean back, her eyes rolled back, and to avoid falling backwards and vomiting was pushed forward by the daughter. The episode lasted several seconds and then the patient regained consciousness. EMS was called.   The patient then seemed slightly disoriented  which lasted several minutes and had completely resolved by the time that EMS had arrived. It was noted that there was some liquid by the patient which was concerning for urinary incontinence however it unclear as the patient stated that her undergarments and dress were dry. The patient was then brought to the ED at [place] and was to undergo cardiac workup and was transferred to [facility] as there was no EEG monitoring. Currently, the patient states that she feels well and denied headaches, visual changes, auditory changes, numbness, tingling, weakness.  Patient notes that in the past she had a fall several months ago while she was ambulating and thinks that she may have lost consciousness during that time.  Patient stated that in the past when she was 10 years old, she was diagnosed with seizures. These episodes were marked by not being able to speak for a brief period of time and disorientation. Stated that she feels that she might remember these episodes but was unsure. She denied taking medications for these events and noted that when she underwent menarche, these episodes stopped and she has not had any since. Is taking Wellbutrin and has been taking the same dose for 16 years as per daughter. Patient NIHSS and MRS score is 0.    Admission consisted of:    Syncope, likely vagal in nature  Cardiology consulted who believes syncopal event could have been vagally mediated but with left bundle branch block  Admit to telemetry  CT head negative for any acute pathology  Obtain echo which shows hyperdynamic LV function  Electrophysiology consulted who recommended possible ischemia workup, noted patient has severe orthostatic hypotension which has improved with IV hydration,  cortisol levels ordered which were normal  EEG was performed which was normal. Neurology states syncope not related to seizure activity  Nuclear stress test performed on 7/3/22 abnormal with multiple defects in several vasc territories. Cath scheduled to define anatomy and need for further intervention on 7/5/22.  Cardiac cath performed which showed non-obstructive CAD.  Fecal occult blood positive  Patient with one dark stool  Epistaxis noted   GI consulted- FOBT positive likely due to previous epistaxis, no intervention needed besides monitoring for GI bleed and CBC monitoring  Epistaxis  Secondary to right nares lesion and coagulopathy due to ASA Plavix and SQ Heparin  ENT consulted   Lesion cauterized and Nasal tamponade placed   Plavix stopped at time  Packing removed on 7/2/22    LAST UPDATED ON 7/6/22 AT 12:11PM   88 year old RH female with a past medical history of HTN, HLD, DM, CAD with stent who presented on 6/27/22 as a transfer from [place] for evaluation of possible seizure episode.    At baseline, the patient is ambulatory with a cane when she goes outside, lives alone and performs her own ADLs. The patient had an episode of nosebleeding for which she first presented to Urgent Care and then went to [facility] ED where her bleeding spontaneously stopped and she was discharged home. She then went home and was sitting down and eating a pastry when she told her daughter who was present that the patient felt like she was going to faint. The patient stated she had symptoms of her vision darkening, a symptom of epigastric discomfort and nausea, sweating and then had lost consciousness. As per the daughter, the patient began to lean back, her eyes rolled back, and to avoid falling backwards and vomiting was pushed forward by the daughter. The episode lasted several seconds and then the patient regained consciousness. EMS was called.   The patient then seemed slightly disoriented  which lasted several minutes and had completely resolved by the time that EMS had arrived. It was noted that there was some liquid by the patient which was concerning for urinary incontinence however it unclear as the patient stated that her undergarments and dress were dry. The patient was then brought to the ED at [place] and was to undergo cardiac workup and was transferred to [facility] as there was no EEG monitoring. Currently, the patient states that she feels well and denied headaches, visual changes, auditory changes, numbness, tingling, weakness.  Patient notes that in the past she had a fall several months ago while she was ambulating and thinks that she may have lost consciousness during that time.  Patient stated that in the past when she was 10 years old, she was diagnosed with seizures. These episodes were marked by not being able to speak for a brief period of time and disorientation. Stated that she feels that she might remember these episodes but was unsure. She denied taking medications for these events and noted that when she underwent menarche, these episodes stopped and she has not had any since. Is taking Wellbutrin and has been taking the same dose for 16 years as per daughter. Patient NIHSS and MRS score is 0.    Admission consisted of:    Syncope, likely vagal in nature  Cardiology consulted who believes syncopal event could have been vagally mediated but with left bundle branch block  Admit to telemetry  CT head negative for any acute pathology  Obtain echo which shows hyperdynamic LV function  Electrophysiology consulted who recommended possible ischemia workup, noted patient has severe orthostatic hypotension which has improved with IV hydration,  cortisol levels ordered which were normal  EEG was performed which was normal. Neurology states syncope not related to seizure activity  Nuclear stress test performed on 7/3/22 abnormal with multiple defects in several vasc territories. Cath scheduled to define anatomy and need for further intervention on 7/5/22.  Cardiac cath performed which showed non-obstructive CAD.  Fecal occult blood positive  Patient with one dark stool  Epistaxis noted   GI consulted- FOBT positive likely due to previous epistaxis, no intervention needed besides monitoring for GI bleed and CBC monitoring  Epistaxis  Secondary to right nares lesion and coagulopathy due to ASA Plavix and SQ Heparin  ENT consulted   Lesion cauterized and Nasal tamponade placed   Plavix stopped at time  Packing removed on 7/2/22.  Noted with minimal epistaxis on 7/5/22 resolved.  No further signs of bleeding.    Medically cleared for d/c home.

## 2022-07-06 NOTE — DISCHARGE NOTE PROVIDER - CARE PROVIDER_API CALL
Joseluis Salcedo)  Internal Medicine  [address] [address]  Phone: [phone]  Fax: [phone]  Follow Up Time:    Joseluis Salcedo)  Internal Medicine  [address] [facility] [facility]  [place] [address]  Phone: [phone]  Fax: [phone]  Follow Up Time:     Gianna Vieira)  Otolaryngology  [address] [facility] [facility] [address]  [place] [address]  Phone: [phone]  Fax: [phone]  Follow Up Time:     Jaspal Augustine)  EndocrinologyMetabDiabetes; Internal Medicine  [address] [place] [address]  Phone: [phone]  Fax: [phone]  Follow Up Time:

## 2022-07-06 NOTE — DISCHARGE NOTE NURSING/CASE MANAGEMENT/SOCIAL WORK - PATIENT PORTAL LINK FT
You can access the FollowMyHealth Patient Portal offered by [facility] by registering at the following website: [email or web address]. By joining [facility]’s FollowMyHealth portal, you will also be able to view your health information using other applications (apps) compatible with our system.

## 2022-07-06 NOTE — DISCHARGE NOTE PROVIDER - NSDCMRMEDTOKEN_GEN_ALL_CORE_FT
allopurinol 100 mg oral tablet: 1 tab(s) orally once a day  amLODIPine 2.5 mg oral tablet: 1 tab(s) orally once a day (at bedtime)  Aspir 81 oral delayed release tablet: 1 tab(s) orally once a day  atorvastatin 40 mg oral tablet: 1 tab(s) orally once a day  buPROPion 150 mg/24 hours (XL) oral tablet, extended release: 1 tab(s) orally every 24 hours  Citracal + D: orally once a day  clopidogrel 75 mg oral tablet: 1 tab(s) orally once a day  Janumet 50 mg-1000 mg oral tablet: 1 tab(s) orally 2 times a day  Lexapro 10 mg oral tablet: 1 tab(s) orally once a day  lisinopril 10 mg oral tablet: 1 tab(s) orally once a day  metoprolol succinate 50 mg oral tablet, extended release: 1 tab(s) orally once a day  Transport Wheelchair: Use as directed    ICD 10: R53.1  Vitamin B12: orally once a day  Walker: Use as directed.    ICD 10: R53.1   allopurinol 100 mg oral tablet: 1 tab(s) orally once a day  amLODIPine 5 mg oral tablet: 1 tab(s) orally once a day  Aspir 81 oral delayed release tablet: 1 tab(s) orally once a day  atorvastatin 40 mg oral tablet: 1 tab(s) orally once a day  bacitracin 500 units/g topical ointment: 1 application topically 2 times a day  buPROPion 150 mg/24 hours (XL) oral tablet, extended release: 1 tab(s) orally every 24 hours  Citracal + D: orally once a day  Janumet 50 mg-1000 mg oral tablet: 1 tab(s) orally 2 times a day  Lexapro 10 mg oral tablet: 1 tab(s) orally once a day  lisinopril 40 mg oral tablet: 1 tab(s) orally once a day  metoprolol succinate 100 mg oral tablet, extended release: 1 tab(s) orally once a day  pantoprazole 40 mg oral delayed release tablet: 1 tab(s) orally once a day (before a meal)  sodium chloride 0.65% nasal spray: 1 spray(s) in each nostril 4 times a day  Transport Wheelchair: Use as directed    ICD 10: R53.1  Vitamin B12: orally once a day  Walker: Use as directed.    ICD 10: R53.1

## 2022-07-06 NOTE — PROGRESS NOTE ADULT - ASSESSMENT
88 year old RH female      h/o HTN, HLD, DM, CAD with stents     who is presenting as a transfer from [place] for evaluation of possible seizure episode./  and  s/p  syncope   LOC episode with prodrome of darkening of vision, sweating, nausea with history of  cad, need  to  r/o  cardiac  causes   per  neuro,  no  seizures meds    tele.  r/o  arrythmia   echo/  card  dr hudson jane/ melissa   HYN/ HLD  on  asa.  lisinoril,  toprol   CAD/  stents, on asa./ plavix/  lipitor   DM,  follow  fs, on  janumet   depression, on bupropion,  lexapro  on  dvt  ppx   +  orthostatic, was  on iv fluids    echo, normal  ef/ card   hudson jane/  ep dr hudson jane   anemia,  guaiac  +/   ?  from her epistaxis ,    hb stable    s/p epistaxis  . reoved  nasal packing  by  ent/  eeg. no  seizures  noted,    stress  test  is +/,    cath  . normal   endo to  f/p  and then d/c  planning

## 2022-07-06 NOTE — PROGRESS NOTE ADULT - SUBJECTIVE AND OBJECTIVE BOX
[facility] - Kasia Cook, Bart, Blas, Kay, Juvenal Jeffries  Office Number:  [phone]    Patient resting comfortably in bed in NAD.  Laying flat with no respiratory distress.  No complaints of chest pain, dyspnea, palpitations, PND, or orthopnea.    ROS: negative unless otherwise mentioned.    Telemetry:  sr, brief pat    MEDICATIONS  (STANDING):  allopurinol 100 milliGRAM(s) Oral daily  amLODIPine   Tablet 5 milliGRAM(s) Oral daily  aspirin enteric coated 81 milliGRAM(s) Oral daily  atorvastatin 40 milliGRAM(s) Oral at bedtime  BACItracin   Ointment 1 Application(s) Topical two times a day  buPROPion XL (24-Hour) . 150 milliGRAM(s) Oral daily  cephalexin 500 milliGRAM(s) Oral every 12 hours  dextrose 5%. 1000 milliLiter(s) (50 mL/Hr) IV Continuous <Continuous>  dextrose 5%. 1000 milliLiter(s) (100 mL/Hr) IV Continuous <Continuous>  dextrose 50% Injectable 25 Gram(s) IV Push once  dextrose 50% Injectable 12.5 Gram(s) IV Push once  dextrose 50% Injectable 25 Gram(s) IV Push once  escitalopram 10 milliGRAM(s) Oral daily  glucagon  Injectable 1 milliGRAM(s) IntraMuscular once  heparin   Injectable 5000 Unit(s) SubCutaneous every 12 hours  insulin glargine Injectable (LANTUS) 10 Unit(s) SubCutaneous at bedtime  insulin lispro (ADMELOG) corrective regimen sliding scale   SubCutaneous three times a day before meals  insulin lispro (ADMELOG) corrective regimen sliding scale   SubCutaneous at bedtime  insulin lispro Injectable (ADMELOG) 7 Unit(s) SubCutaneous three times a day before meals  lisinopril 40 milliGRAM(s) Oral daily  metoprolol succinate  milliGRAM(s) Oral daily  pantoprazole    Tablet 40 milliGRAM(s) Oral before breakfast  sodium chloride 0.65% Nasal 1 Spray(s) Both Nostrils two times a day    MEDICATIONS  (PRN):  dextrose Oral Gel 15 Gram(s) Oral once PRN Blood Glucose LESS THAN 70 milliGRAM(s)/deciliter      Allergies    No Known Allergies    Intolerances        Vital Signs Last 24 Hrs  T(C): 36.5 (06 Jul 2022 04:31), Max: 36.7 (05 Jul 2022 15:45)  T(F): 97.7 (06 Jul 2022 04:31), Max: 98 (05 Jul 2022 15:45)  HR: 72 (06 Jul 2022 04:31) (55 - 72)  BP: 156/64 (06 Jul 2022 04:31) (132/69 - 179/66)  BP(mean): 90 (05 Jul 2022 15:15) (84 - 97)  RR: 18 (06 Jul 2022 04:31) (16 - 20)  SpO2: 99% (06 Jul 2022 04:31) (94% - 99%)    I&O's Summary    05 Jul 2022 07:01  -  06 Jul 2022 07:00  --------------------------------------------------------  IN: 440 mL / OUT: 0 mL / NET: 440 mL        ON EXAM:    Constitutional: well-nourished, well-developed, NAD   HEENT:  MMM, sclerae anicteric, conjunctivae clear, no oral cyanosis.  Pulmonary: Non-labored, breath sounds are clear bilaterally, No wheezing, rales or rhonchi  Cardiovascular: Regular, S1 and S2.  No murmur.  No rubs, gallops or clicks  Gastrointestinal: Bowel Sounds present, soft, nontender.   Lymph: No peripheral edema.   Neurological: Alert, no focal deficits  Skin: No rashes.  Psych:  Mood & affect appropriate    LABS: All Labs Reviewed:                        9.8    5.95  )-----------( 178      ( 05 Jul 2022 06:45 )             31.0     06 Jul 2022 07:16    139    |  102    |  17     ----------------------------<  195    4.2     |  26     |  0.64   06 Jul 2022 00:02    137    |  102    |  19     ----------------------------<  171    4.3     |  26     |  0.68   05 Jul 2022 06:44    141    |  105    |  16     ----------------------------<  210    4.2     |  26     |  0.68     Ca    8.8        06 Jul 2022 07:16  Ca    8.8        06 Jul 2022 00:02  Ca    8.6        05 Jul 2022 06:44  Mg     1.9       06 Jul 2022 00:02            Blood Culture:

## 2022-07-06 NOTE — PROGRESS NOTE ADULT - PROVIDER SPECIALTY LIST ADULT
Cardiology
Cardiology
Electrophysiology
Internal Medicine
Cardiology
Cardiology
Electrophysiology
Endocrinology
Internal Medicine
Internal Medicine
Cardiology
ENT
Electrophysiology
Endocrinology
Gastroenterology
Internal Medicine
Cardiology
Cardiology
ENT
Gastroenterology
Gastroenterology
Internal Medicine
Internal Medicine
ENT
Endocrinology

## 2022-07-06 NOTE — PROVIDER CONTACT NOTE (OTHER) - SITUATION
pt hypertensive, see FS
Large epistaxis
patient glucose of 70
Pt had 5 beats of wide QRS complex on tele
Patient noted to have a nosebleed.
pt c/o black color stool
pt had episode of PAT w/ HR up to 130, for 5 seconds

## 2022-07-06 NOTE — PROGRESS NOTE ADULT - SUBJECTIVE AND OBJECTIVE BOX
CARDIOLOGY     PROGRESS  NOTE   ________________________________________________    CHIEF COMPLAINT:Patient is a 88y old  Female who presents with a chief complaint of syncope/ ?  seizures (05 Jul 2022 15:17)  no complain.  	  REVIEW OF SYSTEMS:  CONSTITUTIONAL: No fever, weight loss, or fatigue  EYES: No eye pain, visual disturbances, or discharge  ENT:  No difficulty hearing, tinnitus, vertigo; No sinus or throat pain  NECK: No pain or stiffness  RESPIRATORY: No cough, wheezing, chills or hemoptysis; No Shortness of Breath  CARDIOVASCULAR: No chest pain, palpitations, passing out, dizziness, or leg swelling  GASTROINTESTINAL: No abdominal or epigastric pain. No nausea, vomiting, or hematemesis; No diarrhea or constipation. No melena or hematochezia.  GENITOURINARY: No dysuria, frequency, hematuria, or incontinence  NEUROLOGICAL: No headaches, memory loss, loss of strength, numbness, or tremors  SKIN: No itching, burning, rashes, or lesions   LYMPH Nodes: No enlarged glands  ENDOCRINE: No heat or cold intolerance; No hair loss  MUSCULOSKELETAL: No joint pain or swelling; No muscle, back, or extremity pain  PSYCHIATRIC: No depression, anxiety, mood swings, or difficulty sleeping  HEME/LYMPH: No easy bruising, or bleeding gums  ALLERGY AND IMMUNOLOGIC: No hives or eczema	    [ ] All others negative	  [ ] Unable to obtain    PHYSICAL EXAM:  T(C): 36.5 (07-06-22 @ 04:31), Max: 36.7 (07-05-22 @ 15:45)  HR: 72 (07-06-22 @ 04:31) (55 - 72)  BP: 156/64 (07-06-22 @ 04:31) (132/69 - 179/66)  RR: 18 (07-06-22 @ 04:31) (16 - 20)  SpO2: 99% (07-06-22 @ 04:31) (94% - 99%)  Wt(kg): --  I&O's Summary    04 Jul 2022 07:01  -  05 Jul 2022 07:00  --------------------------------------------------------  IN: 600 mL / OUT: 0 mL / NET: 600 mL    05 Jul 2022 07:01  -  06 Jul 2022 06:36  --------------------------------------------------------  IN: 440 mL / OUT: 0 mL / NET: 440 mL        Appearance: Normal	  HEENT:   Normal oral mucosa, PERRL, EOMI	  Lymphatic: No lymphadenopathy  Cardiovascular: Normal S1 S2, No JVD, + murmurs, No edema  Respiratory: Lungs clear to auscultation	  Psychiatry: A & O x 3, Mood & affect appropriate  Gastrointestinal:  Soft, Non-tender, + BS	  Skin: No rashes, No ecchymoses, No cyanosis	  Neurologic: Non-focal  Extremities: Normal range of motion, No clubbing, cyanosis or edema  Vascular: Peripheral pulses palpable 2+ bilaterally    MEDICATIONS  (STANDING):  allopurinol 100 milliGRAM(s) Oral daily  amLODIPine   Tablet 5 milliGRAM(s) Oral daily  aspirin enteric coated 81 milliGRAM(s) Oral daily  atorvastatin 40 milliGRAM(s) Oral at bedtime  BACItracin   Ointment 1 Application(s) Topical two times a day  buPROPion XL (24-Hour) . 150 milliGRAM(s) Oral daily  cephalexin 500 milliGRAM(s) Oral every 12 hours  dextrose 5%. 1000 milliLiter(s) (50 mL/Hr) IV Continuous <Continuous>  dextrose 5%. 1000 milliLiter(s) (100 mL/Hr) IV Continuous <Continuous>  dextrose 50% Injectable 25 Gram(s) IV Push once  dextrose 50% Injectable 12.5 Gram(s) IV Push once  dextrose 50% Injectable 25 Gram(s) IV Push once  escitalopram 10 milliGRAM(s) Oral daily  glucagon  Injectable 1 milliGRAM(s) IntraMuscular once  heparin   Injectable 5000 Unit(s) SubCutaneous every 12 hours  insulin glargine Injectable (LANTUS) 10 Unit(s) SubCutaneous at bedtime  insulin lispro (ADMELOG) corrective regimen sliding scale   SubCutaneous three times a day before meals  insulin lispro (ADMELOG) corrective regimen sliding scale   SubCutaneous at bedtime  insulin lispro Injectable (ADMELOG) 7 Unit(s) SubCutaneous three times a day before meals  lisinopril 40 milliGRAM(s) Oral daily  metoprolol succinate  milliGRAM(s) Oral daily  pantoprazole    Tablet 40 milliGRAM(s) Oral before breakfast  sodium chloride 0.65% Nasal 1 Spray(s) Both Nostrils two times a day      TELEMETRY: 	    ECG:  	  RADIOLOGY:  OTHER: 	  	  LABS:	 	    CARDIAC MARKERS:                                9.8    5.95  )-----------( 178      ( 05 Jul 2022 06:45 )             31.0     07-06    137  |  102  |  19  ----------------------------<  171<H>  4.3   |  26  |  0.68    Ca    8.8      06 Jul 2022 00:02  Mg     1.9     07-06      proBNP:   Lipid Profile:   HgA1c:   TSH: Thyroid Stimulating Hormone, Serum: 2.47 uIU/mL (06-28 @ 07:19)          Assessment and plan  ---------------------------  88 year old RH female with a past medical history of HTN, HLD, DM, CAD with stents who is presenting as a transfer from [place] for evaluation of possible seizure episode.    At baseline, the patient is ambulatory with a cane when she goes outside, lives alone and performs her own ADLs. The patient had an episode of nose bleeding for which she first presented to Urgent Care and then went to [facility] ED where her bleeding spontaneously stopped and she was discharged home. She then went home and was sitting down and eating a pastry when she told her daughter who was present that the patient felt like she was going to faint. The patient stated she had symptoms of her vision darkening, a symptom of epigastric discomfort and nausea, sweating and then had lost consciousness. As per the daughter, the patient began to lean back, her eyes rolled back, and to avoid falling backwards and vomiting was pushed forward by the daughter. The episode lasted several seconds and then the patient regained consciousness. EMS was called.    The patient then seemed slightly disoriented  which lasted several minutes and had completely resolved by the time that EMS had arrived. It was noted that there was some liquid by the patient which was concerning for urinary incontinence however it unclear as the patient stated that her undergarments and dress were dry. The patient was then brought to the ED at [place] and was to undergo cardiac workup and was transferred to [facility] as there was no EEG monitoring. Currently, the patient states that she feels well and denied headaches, visual changes, auditory changes, numbness, tingling, weakness.  Patient notes that in the past she had a fall several months ago while she was ambulating and thinks that she may have lost consciousness during that time.  Patient stated that in the past when she was 10 years old, she was diagnosed with seizures. These episodes were marked by not being able to speak for a brief period of time and disorientation. Stated that she feels that she might remember these episodes but was unsure. She denied taking medications for these events and noted that when she underwent menarche, these episodes stopped and she has not had any since.  Is taking Wellbutrin and has been taking the same dose for 16 years as per daughter.   pt with sig cardiac hx s/p multiple stents ?new LBBB  tele nsr  echo  may need ischemia goldstein if not done recently  will adjust cardiac meds  severe orthostatic hypotension, keep sbp >140 at rest sitting  clive stocking  am cortisol lervel  echo noted with septal hypertrophy but no lvot gradient  no calcium channel blocker sec to orthostatic hypotension  ischemia goldstein as per card  increase beta blocker to 100 mg daily  stress test noted, needs cath plan by Cardiology  orthostatic noted as long as pt asymptomatic, no concern  repeat am cortisol level  cath needs ent clearance prior to intervention if needed, awaiting cath results  orthostatics noted, much improved  short run of PAT, continue beta blocker

## 2022-07-06 NOTE — PROVIDER CONTACT NOTE (OTHER) - BACKGROUND
Patient admitted for Syncope and Collapse, Epistaxis, Hyperlipidemia, Hypertension, Diabetes Mellitus.
Pt s/p right groin dx cardiac cath & on bedrest.
admitted for syncope, Possible seizure
pt on telle, normally NS 55-60. pt has PMH of AFIB.
Pt admitted for syncope, new LBBB, positive orthostatic blood pressures, PMH of CAD w/ stent, on Plavix 75mg PO daily. According to pt, she had an episode of large epistaxis on Sunday, 6/26.
pt admitted for syncope, s/p stress test today, on IVF NS @ 70ml/hr for orthostasis
admitted for syncope and collapse

## 2022-07-06 NOTE — CHART NOTE - NSCHARTNOTESELECT_GEN_ALL_CORE
elevated BP/Event Note
+FOBT/Event Note
Chart note
DME Necessity/Event Note
Neurology
PAT's/Event Note
R Nares Epistaxis/Event Note

## 2022-07-06 NOTE — PROGRESS NOTE ADULT - PROBLEM SELECTOR PROBLEM 1
Diabetes mellitus
Epistaxis
Diabetes mellitus
Epistaxis
Diabetes mellitus
Epistaxis
Diabetes mellitus

## 2022-07-06 NOTE — PROGRESS NOTE ADULT - PROBLEM SELECTOR PLAN 1
- Will remove packing on 7/2   - gram-positive abx coverage for duration of packing placement  - Strict blood pressure control  - Nasal saline, 2 sprays to both nares 4 times a day  - Avoid nasal trauma; no nose rubbing, blowing or manipulating nasal packing.  Sneeze with mouth open and pinching nares  - Avoid bending with head blow the waist
- Will remove packing on 7/2   - gram-positive abx coverage for duration of packing placement  - Strict blood pressure control  - Nasal saline, 2 sprays to both nares 4 times a day  - Avoid nasal trauma; no nose rubbing, blowing or manipulating nasal packing.  Sneeze with mouth open and pinching nares  - Avoid bending with head blow the waist  - No heavy lifting  - ENT will continue to follow
Will continue current insulin regimen for now. Will continue monitoring  blood sugars, will Follow up.  Patient counseled for compliance with consistent low carb diet.   Can DC on prior Janumet. FU 3 months.
Will continue Lantus 8u at bedtime.  Will increase Admelog to 7u before each meal and continue Admelog correction scale coverage. Will continue monitoring FS and FU.  Can DC on prior Janumet. FU 3 months.  Patient counseled for compliance with consistent low carb diet.
Strict BP control  Nasal saline to both nares BID  Bacitracin to both nares BID  No nose rubbing or blowing  Sneeze with open mouth  Avoid bending with head below the waist  No heavy lifting
Will continue current insulin regimen. Will continue monitoring FS and FU.  Can DC on prior Janumet. FU 3 months.  Patient counseled for compliance with consistent low carb diet.
Will continue current insulin regimen for now. Will continue monitoring FS and FU.  Patient counseled for compliance with consistent low carb diet.   Can DC on prior Janumet. FU 3 months.
Will increase Lantus to 8u at bedtime.  Will continue Admelog 6u before each meal and Admelog correction scale coverage. Will continue monitoring FS and FU.  Can DC on prior Janumet. FU 3 months.  Patient counseled for compliance with consistent low carb diet.
Will continue Lantus 6u at bedtime.  Will increase Admelog to 6u before each meal and continue Admelog correction scale coverage. Will continue monitoring FS and FU.  Patient counseled for compliance with consistent low carb diet.
Will continue Lantus 6u at bedtime.  Will start Admelog 4u before each meal and continue Admelog correction scale coverage. Will continue monitoring FS and FU.  Patient counseled for compliance with consistent low carb diet.
Will continue current insulin regimen. Will continue monitoring FS and FU.  Can DC on prior Janumet. FU 3 months.  Patient counseled for compliance with consistent low carb diet.
Will increase Lantus to 10u at bedtime.  Will continue Admelog 7u before each meal and Admelog correction scale coverage. Will continue monitoring FS and FU.  Patient counseled for compliance with consistent low carb diet.   Can DC on prior Janumet. FU 3 months.

## 2022-07-06 NOTE — DIETITIAN INITIAL EVALUATION ADULT - PERTINENT MEDS FT
MEDICATIONS  (STANDING):  allopurinol 100 milliGRAM(s) Oral daily  amLODIPine   Tablet 5 milliGRAM(s) Oral daily  aspirin enteric coated 81 milliGRAM(s) Oral daily  atorvastatin 40 milliGRAM(s) Oral at bedtime  BACItracin   Ointment 1 Application(s) Topical two times a day  buPROPion XL (24-Hour) . 150 milliGRAM(s) Oral daily  cephalexin 500 milliGRAM(s) Oral every 12 hours  dextrose 5%. 1000 milliLiter(s) (50 mL/Hr) IV Continuous <Continuous>  dextrose 5%. 1000 milliLiter(s) (100 mL/Hr) IV Continuous <Continuous>  dextrose 50% Injectable 25 Gram(s) IV Push once  dextrose 50% Injectable 12.5 Gram(s) IV Push once  dextrose 50% Injectable 25 Gram(s) IV Push once  escitalopram 10 milliGRAM(s) Oral daily  glucagon  Injectable 1 milliGRAM(s) IntraMuscular once  heparin   Injectable 5000 Unit(s) SubCutaneous every 12 hours  insulin glargine Injectable (LANTUS) 10 Unit(s) SubCutaneous at bedtime  insulin lispro (ADMELOG) corrective regimen sliding scale   SubCutaneous three times a day before meals  insulin lispro (ADMELOG) corrective regimen sliding scale   SubCutaneous at bedtime  insulin lispro Injectable (ADMELOG) 7 Unit(s) SubCutaneous three times a day before meals  lisinopril 40 milliGRAM(s) Oral daily  metoprolol succinate  milliGRAM(s) Oral daily  pantoprazole    Tablet 40 milliGRAM(s) Oral before breakfast  sodium chloride 0.65% Nasal 1 Spray(s) Both Nostrils two times a day    MEDICATIONS  (PRN):  dextrose Oral Gel 15 Gram(s) Oral once PRN Blood Glucose LESS THAN 70 milliGRAM(s)/deciliter

## 2022-07-06 NOTE — DISCHARGE NOTE NURSING/CASE MANAGEMENT/SOCIAL WORK - NSDCPEFALRISK_GEN_ALL_CORE
For information on Fall & Injury Prevention, visit: [email or web address] OR  [email or web address] OR  https://www.cdc.gov/steadi/patient.html

## 2022-07-06 NOTE — PROGRESS NOTE ADULT - PROBLEM SELECTOR PLAN 2
Suggest to continue medications, monitoring, FU primary team recommendations.

## 2022-07-06 NOTE — DIETITIAN INITIAL EVALUATION ADULT - PERTINENT LABORATORY DATA
07-06    139  |  102  |  17  ----------------------------<  195<H>  4.2   |  26  |  0.64    Ca    8.8      06 Jul 2022 07:16  Mg     1.9     07-06    POCT Blood Glucose.: 212 mg/dL (07-05-22 @ 21:18)  A1C with Estimated Average Glucose Result: 7.6 % (06-28-22 @ 07:17)

## 2022-07-06 NOTE — DISCHARGE NOTE PROVIDER - CARE PROVIDERS DIRECT ADDRESSES
,DirectAddress_Unknown ,DirectAddress_Unknown,[email or web address],DirectAddress_Unknown

## 2022-07-06 NOTE — PROGRESS NOTE ADULT - ASSESSMENT
89 yo female with PMHX DM, HTN, HLD, CAD present to ED s/p syncopal episode     - no acute ischemia  - known cad s/p pci in the past  - lbbb uncertain duration  - had not been overly concerned about ischemia but her nuclear stress test was abnormal with multiple defects in several vasc territories  - s/p cath with patent stent and non obs cad  - no vol ol    - no arrhythmias have been noted on tele, other than very brief pat overnight  - syncopal episode was felt to be most likely vagally mediated, with epistaxis earlier in the day  - echocardiogram with hyperdynamic lv function  - noting orthostatic bps would encourage adequate hydration, compression stockings.    - bp had been low and she had been very orthostatic, now improved  - very hypertensive on metoprolol and lisinopril  - have resumed amlodipine    - cont asa alone  - off plavix given epistaxis  - cont statin     - trend creatinine and electrolytes. Keep K>4, mg>2  - will follow with you  - dc planning per primary team

## 2022-07-19 ENCOUNTER — APPOINTMENT (OUTPATIENT)
Dept: CARDIOLOGY | Facility: CLINIC | Age: 87
End: 2022-07-19

## 2022-07-25 ENCOUNTER — APPOINTMENT (OUTPATIENT)
Dept: OTOLARYNGOLOGY | Facility: CLINIC | Age: 87
End: 2022-07-25

## 2022-07-25 VITALS
WEIGHT: 163 LBS | HEART RATE: 68 BPM | OXYGEN SATURATION: 93 % | BODY MASS INDEX: 32 KG/M2 | SYSTOLIC BLOOD PRESSURE: 135 MMHG | DIASTOLIC BLOOD PRESSURE: 69 MMHG | HEIGHT: 60 IN

## 2022-07-25 DIAGNOSIS — H93.291 OTHER ABNORMAL AUDITORY PERCEPTIONS, RIGHT EAR: ICD-10-CM

## 2022-07-25 DIAGNOSIS — H81.11 BENIGN PAROXYSMAL VERTIGO, RIGHT EAR: ICD-10-CM

## 2022-07-25 DIAGNOSIS — H61.23 IMPACTED CERUMEN, BILATERAL: ICD-10-CM

## 2022-07-25 PROCEDURE — 92557 COMPREHENSIVE HEARING TEST: CPT

## 2022-07-25 PROCEDURE — 69210 REMOVE IMPACTED EAR WAX UNI: CPT

## 2022-07-25 PROCEDURE — 95992 CANALITH REPOSITIONING PROC: CPT | Mod: RT

## 2022-07-25 PROCEDURE — 99213 OFFICE O/P EST LOW 20 MIN: CPT | Mod: 25

## 2022-07-25 PROCEDURE — 92567 TYMPANOMETRY: CPT

## 2022-07-25 PROCEDURE — 31231 NASAL ENDOSCOPY DX: CPT

## 2022-07-25 RX ORDER — PANTOPRAZOLE 40 MG/1
40 TABLET, DELAYED RELEASE ORAL
Refills: 0 | Status: ACTIVE | COMMUNITY

## 2022-07-25 RX ORDER — AMLODIPINE BESYLATE 5 MG/1
5 TABLET ORAL
Refills: 0 | Status: ACTIVE | COMMUNITY

## 2022-07-25 RX ORDER — ATORVASTATIN CALCIUM 40 MG/1
40 TABLET, FILM COATED ORAL
Refills: 0 | Status: ACTIVE | COMMUNITY

## 2022-07-25 RX ORDER — ESCITALOPRAM OXALATE 10 MG/1
10 TABLET, FILM COATED ORAL
Refills: 0 | Status: ACTIVE | COMMUNITY

## 2022-07-25 RX ORDER — SITAGLIPTIN AND METFORMIN HYDROCHLORIDE 50; 1000 MG/1; MG/1
50-1000 TABLET, FILM COATED ORAL
Refills: 0 | Status: ACTIVE | COMMUNITY

## 2022-07-25 RX ORDER — LISINOPRIL 20 MG/1
20 TABLET ORAL
Refills: 0 | Status: ACTIVE | COMMUNITY

## 2022-07-25 RX ORDER — ALLOPURINOL 100 MG/1
100 TABLET ORAL
Refills: 0 | Status: ACTIVE | COMMUNITY

## 2022-07-25 RX ORDER — ASPIRIN 81 MG/1
81 TABLET, CHEWABLE ORAL
Refills: 0 | Status: ACTIVE | COMMUNITY

## 2022-07-25 RX ORDER — BUPROPION HYDROCHLORIDE 300 MG/1
300 TABLET, EXTENDED RELEASE ORAL
Refills: 0 | Status: ACTIVE | COMMUNITY

## 2022-07-25 RX ORDER — METOPROLOL TARTRATE 100 MG/1
100 TABLET, FILM COATED ORAL
Refills: 0 | Status: ACTIVE | COMMUNITY

## 2022-07-25 NOTE — PROCEDURE
[Same] : same as the Pre Op Dx. [] : Epley Maneuver [FreeTextEntry6] : Flexible scope #218 was used. Right nasal passage with normal inferior, middle and superior turbinates. Nasal passage patent with clear middle meatus and sphenoethmoid recess. Left nasal passage with normal inferior, middle and superior turbinates. Nasal passage was patent with clear middle meatus and sphenoethmoid recess. No mucopurulence or polyps appreciated. Nasopharynx clear.\par Small pale granulation tissue at site of cauterization on right anterior septum, healing well. \par  Cyclophosphamide Pregnancy And Lactation Text: This medication is Pregnancy Category D and it isn't considered safe during pregnancy. This medication is excreted in breast milk.

## 2022-07-25 NOTE — PHYSICAL EXAM
[Midline] : trachea located in midline position [] : [place]-Hallpike test is positive [Normal] : no rashes [de-identified] : Right with nystagmus

## 2022-07-25 NOTE — ASSESSMENT
[FreeTextEntry1] : Epistaxis, S/p Cauterization in hospital:\par - Nasal moisturization, No nose blowing, Open mouth Sneezing, and to avoid bending with head below the heart or heavy lifting.\par \par Cerumen:\par - Removed in office today\par \par BPPV to right:\par - Pos [place]-Hallpike to Right\par - Epley done\par \par \par I personally saw and examined Ms. ANIKA CHISHOLM in detail this visit today. I personally reviewed the HPI, PMH, FH, SH, ROS and medications/allergies. I have spoken to DIANE Wu regarding the history and have personally determined the assessment and plan of care, and documented this myself. I was present and participated in all key portions of the encounter and all procedures noted above. I have made changes in the body of the note where appropriate.\par \par Attesting Faculty: See Attending Signature Below

## 2022-07-25 NOTE — CONSULT LETTER
[Dear  ___] : Dear  [unfilled], [Consult Letter:] : I had the pleasure of evaluating your patient, [unfilled]. [Please see my note below.] : Please see my note below. [Consult Closing:] : Thank you very much for allowing me to participate in the care of this patient.  If you have any questions, please do not hesitate to contact me. [Sincerely,] : Sincerely, [FreeTextEntry3] : Gianna Vieira MD\par Otolaryngology and Cranial Base Surgery\par Attending Physician - Department of Otolaryngology and Head & Neck Surgery\par Health system\par  - [employer] [facility]\par Office: [phone]\par Fax: [phone]\par

## 2022-07-25 NOTE — HISTORY OF PRESENT ILLNESS
[de-identified] : Pt. seen in Hospital with Right epistaxis.  Had Cauterization and was on Plavix and ASA at the time.  Now of Plavix, continues using ASA.  Last bleed was on 7/11/22.\par Also notes Decreased hearing in right ear and feels that when she lays on the right side gets Vertigo that lasts a few seconds.  No tinnitus.  No pain or d/c.

## 2022-12-04 ENCOUNTER — EMERGENCY (EMERGENCY)
Facility: HOSPITAL | Age: 87
LOS: 1 days | Discharge: HOME | End: 2022-12-04
Attending: EMERGENCY MEDICINE | Admitting: EMERGENCY MEDICINE
Payer: MEDICARE

## 2022-12-04 VITALS
OXYGEN SATURATION: 96 % | TEMPERATURE: 98 F | DIASTOLIC BLOOD PRESSURE: 111 MMHG | HEIGHT: 60 IN | SYSTOLIC BLOOD PRESSURE: 205 MMHG | RESPIRATION RATE: 17 BRPM | HEART RATE: 80 BPM | WEIGHT: 160.06 LBS

## 2022-12-04 VITALS — DIASTOLIC BLOOD PRESSURE: 70 MMHG | SYSTOLIC BLOOD PRESSURE: 184 MMHG

## 2022-12-04 LAB
ALBUMIN SERPL ELPH-MCNC: 3.3 G/DL — SIGNIFICANT CHANGE UP (ref 3.3–5)
ALP SERPL-CCNC: 56 U/L — SIGNIFICANT CHANGE UP (ref 40–120)
ALT FLD-CCNC: 24 U/L — SIGNIFICANT CHANGE UP (ref 12–78)
ANION GAP SERPL CALC-SCNC: 5 MMOL/L — SIGNIFICANT CHANGE UP (ref 5–17)
APTT BLD: 28.7 SEC — SIGNIFICANT CHANGE UP (ref 27.5–35.5)
AST SERPL-CCNC: 17 U/L — SIGNIFICANT CHANGE UP (ref 15–37)
BASOPHILS # BLD AUTO: 0.02 K/UL — SIGNIFICANT CHANGE UP (ref 0–0.2)
BASOPHILS NFR BLD AUTO: 0.3 % — SIGNIFICANT CHANGE UP (ref 0–2)
BILIRUB SERPL-MCNC: 0.4 MG/DL — SIGNIFICANT CHANGE UP (ref 0.2–1.2)
BUN SERPL-MCNC: 18 MG/DL — SIGNIFICANT CHANGE UP (ref 7–23)
CALCIUM SERPL-MCNC: 8.9 MG/DL — SIGNIFICANT CHANGE UP (ref 8.5–10.1)
CHLORIDE SERPL-SCNC: 105 MMOL/L — SIGNIFICANT CHANGE UP (ref 96–108)
CO2 SERPL-SCNC: 29 MMOL/L — SIGNIFICANT CHANGE UP (ref 22–31)
CREAT SERPL-MCNC: 0.69 MG/DL — SIGNIFICANT CHANGE UP (ref 0.5–1.3)
EGFR: 83 ML/MIN/1.73M2 — SIGNIFICANT CHANGE UP
EOSINOPHIL # BLD AUTO: 0.16 K/UL — SIGNIFICANT CHANGE UP (ref 0–0.5)
EOSINOPHIL NFR BLD AUTO: 2.1 % — SIGNIFICANT CHANGE UP (ref 0–6)
GLUCOSE SERPL-MCNC: 140 MG/DL — HIGH (ref 70–99)
HCT VFR BLD CALC: 35.7 % — SIGNIFICANT CHANGE UP (ref 34.5–45)
HGB BLD-MCNC: 10.9 G/DL — LOW (ref 11.5–15.5)
IMM GRANULOCYTES NFR BLD AUTO: 0.3 % — SIGNIFICANT CHANGE UP (ref 0–0.9)
INR BLD: 1 RATIO — SIGNIFICANT CHANGE UP (ref 0.88–1.16)
LYMPHOCYTES # BLD AUTO: 1.39 K/UL — SIGNIFICANT CHANGE UP (ref 1–3.3)
LYMPHOCYTES # BLD AUTO: 18.2 % — SIGNIFICANT CHANGE UP (ref 13–44)
MCHC RBC-ENTMCNC: 27.4 PG — SIGNIFICANT CHANGE UP (ref 27–34)
MCHC RBC-ENTMCNC: 30.5 GM/DL — LOW (ref 32–36)
MCV RBC AUTO: 89.7 FL — SIGNIFICANT CHANGE UP (ref 80–100)
MONOCYTES # BLD AUTO: 0.61 K/UL — SIGNIFICANT CHANGE UP (ref 0–0.9)
MONOCYTES NFR BLD AUTO: 8 % — SIGNIFICANT CHANGE UP (ref 2–14)
NEUTROPHILS # BLD AUTO: 5.43 K/UL — SIGNIFICANT CHANGE UP (ref 1.8–7.4)
NEUTROPHILS NFR BLD AUTO: 71.1 % — SIGNIFICANT CHANGE UP (ref 43–77)
NRBC # BLD: 0 /100 WBCS — SIGNIFICANT CHANGE UP (ref 0–0)
PLATELET # BLD AUTO: 173 K/UL — SIGNIFICANT CHANGE UP (ref 150–400)
POTASSIUM SERPL-MCNC: 4 MMOL/L — SIGNIFICANT CHANGE UP (ref 3.5–5.3)
POTASSIUM SERPL-SCNC: 4 MMOL/L — SIGNIFICANT CHANGE UP (ref 3.5–5.3)
PROT SERPL-MCNC: 7.3 G/DL — SIGNIFICANT CHANGE UP (ref 6–8.3)
PROTHROM AB SERPL-ACNC: 11.7 SEC — SIGNIFICANT CHANGE UP (ref 10.5–13.4)
RBC # BLD: 3.98 M/UL — SIGNIFICANT CHANGE UP (ref 3.8–5.2)
RBC # FLD: 14.5 % — SIGNIFICANT CHANGE UP (ref 10.3–14.5)
SODIUM SERPL-SCNC: 139 MMOL/L — SIGNIFICANT CHANGE UP (ref 135–145)
TROPONIN I, HIGH SENSITIVITY RESULT: 14.6 NG/L — SIGNIFICANT CHANGE UP
WBC # BLD: 7.63 K/UL — SIGNIFICANT CHANGE UP (ref 3.8–10.5)
WBC # FLD AUTO: 7.63 K/UL — SIGNIFICANT CHANGE UP (ref 3.8–10.5)

## 2022-12-04 PROCEDURE — 93010 ELECTROCARDIOGRAM REPORT: CPT

## 2022-12-04 PROCEDURE — 85025 COMPLETE CBC W/AUTO DIFF WBC: CPT

## 2022-12-04 PROCEDURE — 71045 X-RAY EXAM CHEST 1 VIEW: CPT

## 2022-12-04 PROCEDURE — 36415 COLL VENOUS BLD VENIPUNCTURE: CPT

## 2022-12-04 PROCEDURE — 85610 PROTHROMBIN TIME: CPT

## 2022-12-04 PROCEDURE — 80053 COMPREHEN METABOLIC PANEL: CPT

## 2022-12-04 PROCEDURE — 84484 ASSAY OF TROPONIN QUANT: CPT

## 2022-12-04 PROCEDURE — 85730 THROMBOPLASTIN TIME PARTIAL: CPT

## 2022-12-04 PROCEDURE — 71045 X-RAY EXAM CHEST 1 VIEW: CPT | Mod: 26

## 2022-12-04 PROCEDURE — 99285 EMERGENCY DEPT VISIT HI MDM: CPT | Mod: 25

## 2022-12-04 PROCEDURE — 96374 THER/PROPH/DIAG INJ IV PUSH: CPT

## 2022-12-04 PROCEDURE — 99285 EMERGENCY DEPT VISIT HI MDM: CPT | Mod: FS

## 2022-12-04 PROCEDURE — 93005 ELECTROCARDIOGRAM TRACING: CPT

## 2022-12-04 RX ORDER — OXYMETAZOLINE HYDROCHLORIDE 0.5 MG/ML
2 SPRAY NASAL ONCE
Refills: 0 | Status: COMPLETED | OUTPATIENT
Start: 2022-12-04 | End: 2022-12-04

## 2022-12-04 RX ORDER — LABETALOL HCL 100 MG
10 TABLET ORAL ONCE
Refills: 0 | Status: COMPLETED | OUTPATIENT
Start: 2022-12-04 | End: 2022-12-04

## 2022-12-04 RX ADMIN — OXYMETAZOLINE HYDROCHLORIDE 2 SPRAY(S): 0.5 SPRAY NASAL at 19:50

## 2022-12-04 RX ADMIN — Medication 10 MILLIGRAM(S): at 21:44

## 2022-12-04 RX ADMIN — Medication 0.1 MILLIGRAM(S): at 23:05

## 2022-12-04 NOTE — ED PROVIDER NOTE - OBJECTIVE STATEMENT
pt Is a 87yo female with pmhx of cad, htn, presents with nose bleed. pt reports acute nose bleed pta with elevated bp (sbp 190s). pt took extra 10mg lisinopril pta. pt reports she had nose bleeds in the past and was advised to use saline and vaseline which she does irregularly. pt denies fever, cp, sob, n/v.

## 2022-12-04 NOTE — ED PROVIDER NOTE - CARE PROVIDER_API CALL
Tay Desai)  Otolaryngology  [address], [address]  [place] [address]  Phone: [phone]  Fax: [phone]  Follow Up Time: 7-10 Days

## 2022-12-04 NOTE — ED PROVIDER NOTE - NSFOLLOWUPINSTRUCTIONS_ED_ALL_ED_FT
Nosebleed, Adult      A nosebleed is when blood comes out of the nose. Nosebleeds are common and can be caused by many things. They are usually not a sign of a serious medical problem.      Follow these instructions at home:      When you have a nosebleed:      •Sit down.      •Tilt your head a little forward.    •Follow these steps:  1.Pinch your nose with a clean towel or tissue.      2.Keep pinching your nose for 5 minutes. Do not let go.      3.After 5 minutes, let go of your nose.      4.If there is still bleeding, do these steps again. Keep doing these steps until the bleeding stops.        • Do not put tissues or other things in your nose to stop the bleeding.      •Avoid lying down or putting your head back.      •Use a nose spray decongestant as told by your doctor.      After a nosebleed:     •Try not to blow your nose or sniffle for several hours.      •Try not to strain, lift, or bend at the waist for several days.    •Aspirin and blood-thinning medicines make bleeding more likely. If you take these medicines:  •Ask your doctor if you should stop taking them or if you should change how much you take.      •Do not stop taking the medicine unless your doctor tells you to.      •If your nosebleed was caused by dryness, use over-the-counter saline nasal spray or gel and humidifier as told by your doctor. This will keep the inside of your nose moist and allow it to heal. If you need to use one of these products:  •Choose one that is water-soluble.       •Use only as much as you need and use it only as often as needed.       •Do not lie down right away after you use it.      •If you get nosebleeds often, talk with your doctor about treatments. These may include:  •Nasal cautery. A chemical swab or electrical device is used to lightly burn tiny blood vessels inside the nose. This helps stop or prevent nosebleeds.      •Nasal packing. A gauze or other material is placed in the nose to keep constant pressure on the bleeding area.          Contact a doctor if:    •You have a fever.      •You get nosebleeds often.      •You are getting nosebleeds more often than usual.      •You bruise very easily.      •You have something stuck in your nose.      •You have bleeding in your mouth.      •You vomit or cough up brown material.      •You get a nosebleed after you start a new medicine.        Get help right away if:    •You have a nosebleed after you fall or hurt your head.      •Your nosebleed does not go away after 20 minutes.      •You feel dizzy or weak.      •You have unusual bleeding from other parts of your body.      •You have unusual bruising on other parts of your body.      •You get sweaty.      •You vomit blood.        Summary    •Nosebleeds are common. They are usually not a sign of a serious medical problem.      •When you have a nosebleed, sit down and tilt your head a little forward. Pinch your nose with a clean tissue for 5 minutes.      •Use saline spray or saline gel and a humidifier as told by your doctor.      •Get help right away if your nosebleed does not go away after 20 minutes.      This information is not intended to replace advice given to you by your health care provider. Make sure you discuss any questions you have with your health care provider.      Document Revised: 10/15/2020 Document Reviewed: 10/15/2020    [employer] Patient Education © 2022 [employer]              CHRONIC HYPERTENSION - AfterCare(R) Instructions(ER/ED)            Chronic Hypertension    WHAT YOU NEED TO KNOW:    Hypertension is considered chronic when it continues for 3 months or longer. Hypertension that continues causes your heart to work much harder than normal, which may lead to heart damage. Even if you have hypertension for years, lifestyle changes, medicines, or both may help lower your blood pressure.    DISCHARGE INSTRUCTIONS:    Call your local emergency number (911 in the [place]) or have someone call if:   •You have chest pain.      •You have any of the following signs of a heart attack: ?Squeezing, pressure, or pain in your chest      ?You may also have any of the following: ?Discomfort or pain in your back, neck, jaw, stomach, or arm      ?Shortness of breath      ?Nausea or vomiting      ?Lightheadedness or a sudden cold sweat        •You become confused or have difficulty speaking.      •You suddenly feel lightheaded or have trouble breathing.      Return to the emergency department if:   •You have a severe headache or vision loss.      •You have weakness in an arm or leg.      Call your doctor or cardiologist if:   •You feel faint, dizzy, confused, or drowsy.      •You have been taking your blood pressure medicine but your pressure is higher than your provider says it should be.      •You have questions or concerns about your condition or care.      Medicines: You may need any of the following:  •Antihypertensives may be used to help lower your blood pressure. Several kinds of medicines are available. Your healthcare provider may change the medicine or medicines you currently take. This may be needed if your blood pressure is often high when you check it at home or you are having other problems with blood pressure control.      •Diuretics help decrease extra fluid that collects in your body. This will help lower your BP. You may urinate more often while you take this medicine.      •Cholesterol medicine helps lower your cholesterol level. A low cholesterol level helps prevent heart disease and makes it easier to control your blood pressure.      •Take your medicine as directed. Contact your healthcare provider if you think your medicine is not helping or if you have side effects. Tell your provider if you are allergic to any medicine. Keep a list of the medicines, vitamins, and herbs you take. Include the amounts, and when and why you take them. Bring the list or the pill bottles to follow-up visits. Carry your medicine list with you in case of an emergency.      Stages of hypertension: Your healthcare provider will give you a blood pressure goal based on your age, health, and risk for cardiovascular disease. The following are general guidelines on the stages of hypertension:  Blood Pressure Readings       •Normal blood pressure is 119/79 or lower. Your provider may only check your blood pressure each year if it stays at a normal level.      •Elevated blood pressure is 120/79 to 129/79. This is sometimes called prehypertension. Your provider may suggest lifestyle changes to help lower your blood pressure to a normal level. He or she may then check it again in 3 to 6 months.      •Stage 1 hypertension is 130/80 to 139/89. Your provider may recommend lifestyle changes, medication, and checks every 3 to 6 months until your blood pressure is controlled.      •Stage 2 hypertension is 140/90 or higher. Your provider will recommend lifestyle changes and have you take 2 kinds of hypertension medicines. You will also need to have your blood pressure checked monthly until it is controlled.    Blood Pressure Readings         Manage chronic hypertension:   •Check your blood pressure at home. Do not smoke, have caffeine, or exercise for at least 30 minutes before you check your blood pressure. Sit and rest for 5 minutes before you check your blood pressure. Extend your arm and support it on a flat surface. Your arm should be at the same level as your heart. Follow the directions that came with your blood pressure monitor. Check your blood pressure 2 times, 1 minute apart, before you take your medicine in the morning. Also check your blood pressure before your evening meal. Keep a record of your readings and bring it to your follow-up visits. Your healthcare provider may use the readings to make changes to your treatment plan.  How to take a Blood Pressure           •Manage any other health conditions you have. Health conditions such as diabetes can increase your risk for hypertension. Follow your provider's instructions and take all your medicines as directed. Talk to your provider about any new health conditions you have recently developed.      •Ask about all medicines. Certain medicines can increase your blood pressure. Examples include oral birth control pills, decongestants, herbal supplements, and NSAIDs, such as ibuprofen. Your provider can tell you which medicines are safe for you to take. This includes prescription and over-the-counter medicines.      Lifestyle changes you can make to lower your blood pressure: Your healthcare provider may want you to make more lifestyle changes if you are having trouble controlling your blood pressure. This may feel difficult over time, especially if you think you are making good changes but your pressure is still high. It might help to focus on one new change at a time. For example, try to add 1 more day of exercise, or exercise for an extra 10 minutes on 2 days. Small changes can make a big difference. Your provider can also refer you to specialists such as a dietitian who can help you make small changes. Your family members may be included in helping you learn to create lifestyle changes, such as the following:  Ways to Lower Your Blood Pressure       •Limit sodium (salt) as directed. Too much sodium can affect your fluid balance. Check labels to find low-sodium or no-salt-added foods. Some low-sodium foods use potassium salts for flavor. Too much potassium can also cause health problems. Your provider will tell you how much sodium and potassium are safe for you to have in a day. He or she may recommend that you limit sodium to 2,300 mg a day.             •Follow the meal plan recommended by your provider. A dietitian or your provider can give you more information on low-sodium plans or the DASH (Dietary Approaches to Stop Hypertension) eating plan. The DASH plan is low in sodium, processed sugar, unhealthy fats, and total fat. It is high in potassium, calcium, and fiber. These can be found in vegetables, fruit, and whole-grain foods.             •Be physically active throughout the day. Physical activity, such as exercise, can help control your blood pressure and your weight. Be physically active for at least 30 minutes per day, on most days of the week. Include aerobic activity, such as walking or riding a bicycle. Also include strength training at least 2 times each week. Your provider can help you create a physical activity plan.   FAMILY WALKING FOR EXERCISE       Strength Training for Adults           •Decrease stress. This may help lower your blood pressure. Learn ways to relax, such as deep breathing or listening to music.      •Limit alcohol as directed. Alcohol can increase your blood pressure. A drink of alcohol is 12 ounces of beer, 5 ounces of wine, or 1½ ounces of liquor. Your provider can help you set daily and weekly drink limits. He or she may recommend no alcohol if your blood pressure stays higher than goal even with medicine or other measures. Ask your provider for information if you need help to quit.      •Do not smoke. Nicotine and other chemicals in cigarettes and cigars can increase your blood pressure and also cause lung damage. Ask your provider for information if you currently smoke and need help to quit. E-cigarettes or smokeless tobacco still contain nicotine. Talk to your provider before you use these products.  Prevent Heart Disease            Follow up with your doctor or cardiologist as directed: You will need to return to have your blood pressure checked and to have other lab tests done. Write down your questions so you remember to ask them during your visits.       © Copyright [employer] 2022           back to top                          © Copyright [employer] 2022

## 2022-12-04 NOTE — ED PROVIDER NOTE - PROGRESS NOTE DETAILS
PT IMPROVED. bp improved. advised pcp follow up tomorrow for bp check and ent. All imaging and labs reviewed. all results reviewed with pt including abnormal results. pt given a copy of results. pt advised to follow up with pmd regarding abnormal results. All questions answered and concerns addressed. pt verbalized understanding and agreement with plan and dx. pt advised on next step and when/where to follow up. pt advised on all take home and otc medications. pt advised to follow up with PMD. pt advised to return to ed for worsening symptoms including fever, cp, sob. will dc.

## 2022-12-04 NOTE — ED ADULT NURSE NOTE - OBJECTIVE STATEMENT
Pt presents to the ED s/p epistaxis. Pt presents to the ED s/p epistaxis, elevated BP Pt presents to the ED s/p epistaxis, elevated BP, denies Headache, visual changes.

## 2022-12-04 NOTE — ED PROVIDER NOTE - CLINICAL SUMMARY MEDICAL DECISION MAKING FREE TEXT BOX
pt Is a 89yo female with pmhx of cad, htn, presents with nose bleed. pt reports acute nose bleed pta with elevated bp (sbp 190s). pt took extra 10mg lisinopril pta. pt reports she had nose bleeds in the past and was advised to use saline and vaseline which she does irregularly. pt denies fever, cp, sob, n/v.  Patient in the emergency department no longer bleeding but bleeding source in the right naris identified patient does have hypertension but has a history of hypertension but is quite elevated at this time and probably contributing to the bleeding with no other symptoms patient given labetalol and blood pressure improved however still high at a systolic of 180 we will give a dose of clonidine and discharged patient to follow-up with her primary care tomorrow for repeat blood pressure check this patient presented with high systolic blood pressure do not want to decrease blood pressure too much and shooting for a systolic blood pressure of approximately 150 by tomorrow when patient sees her doctor.  Patient also given ENT to follow-up with.  Return for repeat nosebleed that does not resolve with pressure

## 2022-12-04 NOTE — ED PROVIDER NOTE - PATIENT PORTAL LINK FT
You can access the FollowMyHealth Patient Portal offered by [facility] by registering at the following website: [email or web address]. By joining [employer]’s FollowMyHealth portal, you will also be able to view your health information using other applications (apps) compatible with our system.

## 2022-12-12 ENCOUNTER — APPOINTMENT (OUTPATIENT)
Dept: OTOLARYNGOLOGY | Facility: CLINIC | Age: 87
End: 2022-12-12

## 2022-12-12 VITALS
BODY MASS INDEX: 30.63 KG/M2 | HEART RATE: 76 BPM | SYSTOLIC BLOOD PRESSURE: 154 MMHG | HEIGHT: 60 IN | DIASTOLIC BLOOD PRESSURE: 62 MMHG | WEIGHT: 156 LBS

## 2022-12-12 DIAGNOSIS — D18.01 HEMANGIOMA OF SKIN AND SUBCUTANEOUS TISSUE: ICD-10-CM

## 2022-12-12 DIAGNOSIS — H61.22 IMPACTED CERUMEN, LEFT EAR: ICD-10-CM

## 2022-12-12 DIAGNOSIS — I10 ESSENTIAL (PRIMARY) HYPERTENSION: ICD-10-CM

## 2022-12-12 PROCEDURE — 30903 CONTROL OF NOSEBLEED: CPT | Mod: 79

## 2022-12-12 PROCEDURE — 99214 OFFICE O/P EST MOD 30 MIN: CPT | Mod: 25

## 2022-12-12 PROCEDURE — 69210 REMOVE IMPACTED EAR WAX UNI: CPT

## 2022-12-12 NOTE — PHYSICAL EXAM
[Midline] : trachea located in midline position [Normal] : no rashes [Hearing Loss Right Only] : normal [Hearing Loss Left Only] : normal [FreeTextEntry8] : cerumen removed via curettage. Very narrow and dry ear canals [FreeTextEntry9] : cerumen impaction removed via curettage. Very narrow and dry ear canals [FreeTextEntry5] : no response to tuning fork [FreeTextEntry1] : -vascular lesion on the right side of nose [de-identified] : very dry mouth. 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

## 2022-12-12 NOTE — CONSULT LETTER
[Dear  ___] : Dear  [unfilled], [Consult Letter:] : I had the pleasure of evaluating your patient, [unfilled]. [Please see my note below.] : Please see my note below. [Consult Closing:] : Thank you very much for allowing me to participate in the care of this patient.  If you have any questions, please do not hesitate to contact me. [Sincerely,] : Sincerely, [FreeTextEntry1] : Tay Desai MD FACS

## 2022-12-12 NOTE — PROCEDURE
[FreeTextEntry1] : Right Electro Nasal Cauterization [FreeTextEntry2] : nose bleed [FreeTextEntry3] : Procedure: Right Electro Nasal Cauterization. 8 cc of 1% xyclocaine 1:660302 epi was injected into the right side of the nose. Cauterized with the electro-cautery. Post-procedure instructions given. Pt tolerated the procedure well.

## 2022-12-12 NOTE — ADDENDUM
[FreeTextEntry1] : Patient experienced bleeding from injection sites. Injection sites were cauterized with Silver Nitrate. Instructions provided to patient.

## 2022-12-12 NOTE — HISTORY OF PRESENT ILLNESS
[de-identified] : Patient with h/o nose bleeds - takes a baby aspirin, is off the Plavix presents today stating that she is having nose bleeds again from the right side.

## 2022-12-12 NOTE — ASSESSMENT
[FreeTextEntry1] : Reviewed and reconciled medications, allergies, PMHx, PSHx, SocHx, FMHx \par \par Patient with h/o nose bleeds - takes a baby aspirin, is off the Plavix presents today stating that she is having nose bleeds again from the right side.\par \par Physical Exam:\par -Right Ear: cerumen removed via curettage. Very narrow and dry ear canals\par -Left Ear: cerumen impaction removed via curettage. 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 \par \par Plan: Start using saline gel spray. No nose blowing.  FU 2-3 weeks.\par \par

## 2023-01-03 ENCOUNTER — APPOINTMENT (OUTPATIENT)
Dept: OTOLARYNGOLOGY | Facility: CLINIC | Age: 88
End: 2023-01-03
Payer: MEDICARE

## 2023-01-03 VITALS
WEIGHT: 155 LBS | HEIGHT: 60 IN | HEART RATE: 68 BPM | BODY MASS INDEX: 30.43 KG/M2 | SYSTOLIC BLOOD PRESSURE: 146 MMHG | DIASTOLIC BLOOD PRESSURE: 72 MMHG

## 2023-01-03 DIAGNOSIS — R04.0 EPISTAXIS: ICD-10-CM

## 2023-01-03 PROCEDURE — 99213 OFFICE O/P EST LOW 20 MIN: CPT

## 2023-01-03 NOTE — HISTORY OF PRESENT ILLNESS
[de-identified] : The patient presents with h/o right epistaxis s/p right sided electro nasal cautery 12/12/22. Pt presents today for follow up on nasal cautery. Denies any further episodes of epistaxis. Pt notes some light clear mucus drainage. Pt still taking Aspirin.

## 2023-01-03 NOTE — CONSULT LETTER
[Dear  ___] : Dear  [unfilled], [Courtesy Letter:] : I had the pleasure of seeing your patient, [unfilled], in my office today. [Please see my note below.] : Please see my note below. [Consult Closing:] : Thank you very much for allowing me to participate in the care of this patient.  If you have any questions, please do not hesitate to contact me. [Sincerely,] : Sincerely, [FreeTextEntry3] : Tay Desai MD FACS

## 2023-01-03 NOTE — ADDENDUM
[FreeTextEntry1] : Documented by Jassi Ramirez acting as scribe for Dr. Desai on 01/03/2023.\par \par All Medical record entries made by the Scribe were at my, Dr. Desai, direction and personally dictated by me on 01/03/2023. I have reviewed the chart and agree that the record accurately reflects my personal performance of the history, physical exam, assessment and plan. I have also personally directed, reviewed, and agreed with the discharge instructions.

## 2023-01-03 NOTE — PHYSICAL EXAM
[Normal] : lingual tonsils are normal [Midline] : trachea located in midline position [de-identified] : septum intact [de-identified] : right side with some scabbing or crusting [de-identified] : very dry

## 2023-01-17 NOTE — ED ADULT NURSE NOTE - PAIN RATING/NUMBER SCALE (0-10): ACTIVITY
0
Note Text (......Xxx Chief Complaint.): This diagnosis correlates with the
Render Risk Assessment In Note?: no
Detail Level: Zone
Other (Free Text): Pt forgot po instructions. Went over wound care for biopsies with wife. All questions and concerns addressed.

## 2023-02-06 NOTE — PHYSICAL THERAPY INITIAL EVALUATION ADULT - MANUAL MUSCLE TESTING RESULTS, REHAB EVAL
63 year old male with hx of CVA of left ventral rosalie 2015, PE in 2015 and on Eliquis since that time, HLD, CHF (undocumented EF) presents with epistaxis starting yesterday.  Patient states he was bled a large amount (unable to quantify) from both nostrils while he was at the nursing home    # Epistaxis likely predisposed due to Eliquis and aspirin use  - Hb now 7.8, downtrending  - aspirin and Eliquis were being held in NH, anticoagulation held  - rhinorocket was refused, surgicell placed, currently no bleeding  - hold aspirin for now and restart if no further epistaxis (needed for secondary stroke prevention)  - ENT:        - No active bleeding or clots noted to b/l nares or posterior oropharynx        - Monitor H/H, transfuse prn. Hgb 12.7>10.4 >8.5>7.8>8.1         - No further acute ENT intervention at this time        - Recall prn   - Follow CBC  - Stable for discharge, authorization denied today so will discharge [facility]    # PE dx in 2015 - unprovoked per chart review  - was on Eliquis since that time  - pt denies any VTE events since 2015  - f/u d-dimer: 54  - f/u va duplex: No evidence of deep venous thrombosis in either lower extremity.  - hold Eliquis for now due to epistaxis. Will discontinue upon discharge  - pt does not want to resume Eliquis once epistaxis is resolved    # DAYDAY  - had an increase in his creatinine from 0.8 to 1.2 before being discharged last admission, today creatinine is 1.2  - BUN increased before admission suggesting pre-renal DAYDAY, however today BUN is much higher 46 compared to 18 // likely from ingesting blood from epistaxis coupled with mild pre-renal DAYDAY since he left hospital  - Fluids stopped    # CHF w/ undocumented EF  - takes lasix 20 po qd and lopressor 25 bid in the NH  - will hold lasix for now considering DAYDAY, will hold lopressor for now considering vitals suggesting he is orthostatic // has a hx of HTN however having soft BP and elevated HR  - ECHO: Left ventricular ejection fraction, by visual estimation, is 60 to 65%    # HLD  - c/w atorvastatin 80, if continuing to bleed consider holding  - LDL: 45, Non HDL cholesterol: 71    # GERD  - c/w protonix    PLAN: f/u ENT, Pt not a candidate for AC.     # DVT PPX: SCDs  # GI PPX: protonix  
63 year old male with hx of CVA of left ventral rosalie 2015, PE in 2015 and on Eliquis since that time, HLD, CHF (undocumented EF) presents with epistaxis starting yesterday.  Patient states he was bled a large amount (unable to quantify) from both nostrils while he was at the nursing home    # Epistaxis likely predisposed due to Eliquis and aspirin use  - Hb now 7.8, downtrending  - aspirin and Eliquis were being held in NH, anticoagulation held  - rhinorocket was refused, surgicell placed, currently no bleeding  - hold aspirin for now and restart if no further epistaxis (needed for secondary stroke prevention)  - ENT:        - No active bleeding or clots noted to b/l nares or posterior oropharynx        - Monitor H/H, transfuse prn. Hgb 12.7>10.4 >8.5>7.8>8.1         - No further acute ENT intervention at this time        - Recall prn   - Follow CBC  - Stable for discharge, authorization denied today so will discharge [facility]    # PE dx in 2015 - unprovoked per chart review  - was on Eliquis since that time  - pt denies any VTE events since 2015  - f/u d-dimer: 54  - f/u va duplex: No evidence of deep venous thrombosis in either lower extremity.  - hold Eliquis for now due to epistaxis. Will discontinue upon discharge  - pt does not want to resume Eliquis once epistaxis is resolved    # DAYDAY  - had an increase in his creatinine from 0.8 to 1.2 before being discharged last admission, today creatinine is 1.2  - BUN increased before admission suggesting pre-renal DAYDAY, however today BUN is much higher 46 compared to 18 // likely from ingesting blood from epistaxis coupled with mild pre-renal DAYDAY since he left hospital  - Fluids stopped    # HFpef  - takes lasix 20 po qd and lopressor 25 bid in the NH  - will hold lasix for now considering DAYDAY, will hold lopressor for now considering vitals suggesting he is orthostatic // has a hx of HTN however having soft BP and elevated HR  - ECHO: Left ventricular ejection fraction, by visual estimation, is 60 to 65%    # Alcohol abuse with nutrition deficiency.     # HLD  - c/w atorvastatin 80, if continuing to bleed consider holding  - LDL: 45, Non HDL cholesterol: 71    # GERD  - c/w protonix    PLAN: f/u ENT, Pt not a candidate for AC.     # DVT PPX: SCDs  # GI PPX: protonix  
Pt has palpitations, experiencing chest discomfort.
Functionally assessed atleast 3+/5 for B UE and LE while pushing up from bed into sitting./grossly assessed due to

## 2023-03-30 ENCOUNTER — EMERGENCY (EMERGENCY)
Facility: HOSPITAL | Age: 88
LOS: 1 days | Discharge: HOME | End: 2023-03-30
Attending: EMERGENCY MEDICINE | Admitting: EMERGENCY MEDICINE
Payer: MEDICARE

## 2023-03-30 VITALS
DIASTOLIC BLOOD PRESSURE: 71 MMHG | TEMPERATURE: 97 F | SYSTOLIC BLOOD PRESSURE: 164 MMHG | RESPIRATION RATE: 17 BRPM | WEIGHT: 153 LBS | HEIGHT: 61 IN | HEART RATE: 70 BPM | OXYGEN SATURATION: 96 %

## 2023-03-30 PROCEDURE — 70450 CT HEAD/BRAIN W/O DYE: CPT | Mod: 26,MA

## 2023-03-30 PROCEDURE — 99284 EMERGENCY DEPT VISIT MOD MDM: CPT

## 2023-03-30 NOTE — ED ADULT NURSE NOTE - OBJECTIVE STATEMENT
Pt BIBA from restaurant tripped and fell while coming down the stairs, c/o soreness back of head, denies LOC, pt is on baby aspirin daily.   PMH of HTN. pt walks with walker at baseline. Denies any other pain or trauma.

## 2023-03-30 NOTE — ED ADULT NURSE NOTE - NSIMPLEMENTINTERV_GEN_ALL_ED
Implemented All Universal Safety Interventions:  [place] to call system. Call bell, personal items and telephone within reach. Instruct patient to call for assistance. Room bathroom lighting operational. Non-slip footwear when patient is off stretcher. Physically safe environment: no spills, clutter or unnecessary equipment. Stretcher in lowest position, wheels locked, appropriate side rails in place.

## 2023-03-31 VITALS
HEART RATE: 72 BPM | RESPIRATION RATE: 18 BRPM | DIASTOLIC BLOOD PRESSURE: 68 MMHG | SYSTOLIC BLOOD PRESSURE: 134 MMHG | OXYGEN SATURATION: 98 %

## 2023-03-31 PROCEDURE — 70450 CT HEAD/BRAIN W/O DYE: CPT | Mod: MA

## 2023-03-31 PROCEDURE — 99284 EMERGENCY DEPT VISIT MOD MDM: CPT | Mod: 25

## 2023-03-31 RX ORDER — ACETAMINOPHEN 500 MG
975 TABLET ORAL ONCE
Refills: 0 | Status: COMPLETED | OUTPATIENT
Start: 2023-03-31 | End: 2023-03-31

## 2023-03-31 RX ADMIN — Medication 975 MILLIGRAM(S): at 00:36

## 2023-03-31 NOTE — ED PROVIDER NOTE - OBJECTIVE STATEMENT
Patient status post trip and fall when walking out of a restaurant and walking down 2 steps.  Patient fell backward and hit the back of her head.  Family was with her at the time.  There was no loss of consciousness and patient was able to get up and stand and walk.  Patient is not on any blood thinners.

## 2023-03-31 NOTE — ED PROVIDER NOTE - PHYSICAL EXAMINATION
Gen: alert, NAD  HEENT:  L posterior scalp hematoma, PERRLA, no exophthalmos  CV:  well perfused, rrr   Pulm:  normal RR, breathing comfortably, CTA b/l  Abd: s/nt/nd  MSK: moving all extremities  Neuro:  non-focal  Skin:  visualized areas intact  Psych: AOx3

## 2023-03-31 NOTE — ED PROVIDER NOTE - NSFOLLOWUPINSTRUCTIONS_ED_ALL_ED_FT
-- You should update your primary care physician on your Emergency Department visit and follow up with them.  If you do not have a physician or have difficulty following up, please call: [phone] to obtain a [facility] doctor or specialist who can provide follow up.    -- Return to the ER for worsening or persistent symptoms, and/or ANY NEW OR CONCERNING SYMPTOMS.

## 2023-03-31 NOTE — ED PROVIDER NOTE - PATIENT PORTAL LINK FT
You can access the FollowMyHealth Patient Portal offered by [facility] by registering at the following website: [email or web address]. By joining [employer]’s FollowMyHealth portal, you will also be able to view your health information using other applications (apps) compatible with our system.

## 2023-04-05 ENCOUNTER — APPOINTMENT (OUTPATIENT)
Dept: OTOLARYNGOLOGY | Facility: CLINIC | Age: 88
End: 2023-04-05
Payer: MEDICARE

## 2023-04-05 VITALS
WEIGHT: 155 LBS | HEART RATE: 78 BPM | BODY MASS INDEX: 30.43 KG/M2 | SYSTOLIC BLOOD PRESSURE: 154 MMHG | HEIGHT: 60 IN | DIASTOLIC BLOOD PRESSURE: 73 MMHG

## 2023-04-05 DIAGNOSIS — K13.21 LEUKOPLAKIA OF ORAL MUCOSA, INCLUDING TONGUE: ICD-10-CM

## 2023-04-05 DIAGNOSIS — D68.9 POISONING BY ANTICOAGULANT ANTAGONISTS, VITAMIN K AND OTHER COAGULANTS, ACCIDENTAL (UNINTENTIONAL), INITIAL ENCOUNTER: ICD-10-CM

## 2023-04-05 DIAGNOSIS — J34.2 DEVIATED NASAL SEPTUM: ICD-10-CM

## 2023-04-05 DIAGNOSIS — H60.63 UNSPECIFIED CHRONIC OTITIS EXTERNA, BILATERAL: ICD-10-CM

## 2023-04-05 DIAGNOSIS — T45.7X1A POISONING BY ANTICOAGULANT ANTAGONISTS, VITAMIN K AND OTHER COAGULANTS, ACCIDENTAL (UNINTENTIONAL), INITIAL ENCOUNTER: ICD-10-CM

## 2023-04-05 PROBLEM — I10 ESSENTIAL (PRIMARY) HYPERTENSION: Chronic | Status: ACTIVE | Noted: 2023-03-30

## 2023-04-05 PROCEDURE — 99213 OFFICE O/P EST LOW 20 MIN: CPT

## 2023-04-05 NOTE — ADDENDUM
[FreeTextEntry1] : Documented by Ara Kaur acting as scribe for Dr. Desai on 04/05/2023.\par \par All Medical record entries made by the scribe were at my, Dr. Desai,direction and personally dictated by me on 04/05/2023. I have reviewed the chart and agree that the record accurately reflects my personal performance of the history, physical exam, assessment and plan. I have also personally directed, reviewed, and agreed with the discharge instructions.

## 2023-04-05 NOTE — PHYSICAL EXAM
[Midline] : trachea located in midline position [Normal] : orientation to person, place, and time: normal [FreeTextEntry8] : cerumen removed via curettage  [FreeTextEntry9] : cerumen removed via curettage  [FreeTextEntry5] : no response to tuning forks [de-identified] : mildly deviated septum  [de-identified] : no bleeding sites, no crusting [de-identified] : leukoplakia on cheeks l>r

## 2023-04-05 NOTE — ASSESSMENT
[FreeTextEntry1] : Reviewed and reconciled medications, allergies, PMHx, PSHx, SocHx, FMHx.\par \par Pt presents with h/o right epistaxis s/p right sided electro nasal cautery 12/12/22. Pt presents today for follow up on nose.  \par \par Physical Exam -\par right ear cerumen removed\par left ear cerumen removed\par no response to tuning forks\par mildly deviated septum, no crusting, no bleeding sites\par \par Plan: \par Cerumen removed. Pt can discontinue saline gel spray. FU 3 months - audio test

## 2023-04-05 NOTE — HISTORY OF PRESENT ILLNESS
[de-identified] : Pt presents with h/o right epistaxis s/p right sided electro nasal cautery 12/12/22. Pt presents today for follow up on nose. Denies further epistaxis. Pt notes she still uses saline gel spray. Pt notes she fell backwards a week ago and hit the back of her head, denies feeling unconscious. Pt notes she was holding too much stuff as she was walking up the stairs which made her fall, but went to ER and CT head was normal. Pt's son notes pt has some difficulty hearing, especially understanding what people say.

## 2023-04-12 NOTE — ED ADULT NURSE NOTE - ED COMFORT CARE
Patient informed Niacinamide Pregnancy And Lactation Text: These medications are considered safe during pregnancy.

## 2023-05-01 NOTE — CONSULT NOTE ADULT - CONSULT REQUESTED BY NAME
Medicine.
ED
EVANGELIST Ho MD
Dr Ho
Dr. Scales
dr tom
Patient/Caregiver requests family/friend to interpret.

## 2023-07-12 ENCOUNTER — APPOINTMENT (OUTPATIENT)
Dept: OTOLARYNGOLOGY | Facility: CLINIC | Age: 88
End: 2023-07-12

## 2023-09-05 ENCOUNTER — APPOINTMENT (OUTPATIENT)
Dept: OTOLARYNGOLOGY | Facility: CLINIC | Age: 88
End: 2023-09-05
Payer: MEDICARE

## 2023-09-05 VITALS
WEIGHT: 153 LBS | DIASTOLIC BLOOD PRESSURE: 69 MMHG | HEIGHT: 60 IN | SYSTOLIC BLOOD PRESSURE: 136 MMHG | HEART RATE: 84 BPM | BODY MASS INDEX: 30.04 KG/M2

## 2023-09-05 DIAGNOSIS — R68.2 DRY MOUTH, UNSPECIFIED: ICD-10-CM

## 2023-09-05 DIAGNOSIS — J38.00 PARALYSIS OF VOCAL CORDS AND LARYNX, UNSPECIFIED: ICD-10-CM

## 2023-09-05 DIAGNOSIS — R05.9 COUGH, UNSPECIFIED: ICD-10-CM

## 2023-09-05 DIAGNOSIS — R13.12 DYSPHAGIA, OROPHARYNGEAL PHASE: ICD-10-CM

## 2023-09-05 PROCEDURE — 92557 COMPREHENSIVE HEARING TEST: CPT

## 2023-09-05 PROCEDURE — 31575 DIAGNOSTIC LARYNGOSCOPY: CPT | Mod: 79

## 2023-09-05 PROCEDURE — G0268 REMOVAL OF IMPACTED WAX MD: CPT

## 2023-09-05 PROCEDURE — 92567 TYMPANOMETRY: CPT

## 2023-09-05 PROCEDURE — 99214 OFFICE O/P EST MOD 30 MIN: CPT | Mod: 25

## 2023-09-05 NOTE — ASSESSMENT
[FreeTextEntry1] : Reviewed and Reconciled the pmhx, fmhx, sochx, and medhx Ms. Gomez is an 89 year lady who has h/o nose bleeds presents today for difficulty swallowing. When she eats or drinks, she coughs a lot. The food goes down, but she occasionally uses water to help. She feels she needs to stay calm in order to help her swallow.  She recently fell in June and broke her back in 3 places. She was in [facility] hospital and has therapy. She will see her surgeon again for follow-up next week. They told her to do exercises for her swallow at [facility].  Physical Exam: cerumen impaction removed via curettage bilaterally dupree's test: fades early, no lateralization  deviated septum right  Flexible Laryngoscopy, left nostril clear to NP slight narrowing vallecula normal incomplete glottic closure right and left vocal cord no moving no pooling, no growths  audio 9/5/23 Type As tymps AU essentially mild to moderate SNHl, 250-8000 Hz, AU fair SRS AU essentially no change since 2022 TPP 25 RIGHT TPP 10 LEFT REC: ENT f/u, re-eval per MD, f/u with previous recs re: hearing aids  Plan: MBS ordered. Audio- results interpreted by Dr. Desai and reviewed with the patient.  f/u with previous recs re: hearing aids

## 2023-09-05 NOTE — PROCEDURE
[Dysphagia] : dysphagia not clearly evaluated by indirect laryngoscopy [None] : none [de-identified] : Flexible Laryngoscopy, left nostril clear to NP slight narrowing vallecula normal incomplete glottic closure right and left vocal cord no moving no pooling, no growths

## 2023-09-05 NOTE — ADDENDUM
[FreeTextEntry1] : Documented by Ilana Cruz acting as a scribe for Dr. Desai on [mm//dd/yyyy].   All Medical record entries made by the scribe were at my, Dr. Desai, direction and personally directed by me on 09/05/2023. I have reviewed the chart and agree that the record accurately reflects my personal performance of the history, physical exam, assessment, and plan. I have also personally directed, reviewed, and agreed with the discharge instructions.

## 2023-09-05 NOTE — PHYSICAL EXAM
[Midline] : trachea located in midline position [Removed] : palatine tonsils previously removed [Normal] : orientation to person, place, and time: normal [Hearing Loss Right Only] : normal [Hearing Loss Left Only] : normal [FreeTextEntry8] : cerumen impaction removed via curettage  [FreeTextEntry9] : cerumen impaction removed via curettage  [FreeTextEntry5] : fades early, no lateralization  [de-identified] : deviated septum right  [de-identified] : no tonsils, mouth dry

## 2023-09-05 NOTE — DATA REVIEWED
[de-identified] : Type As tymps AU essentially mild to moderate SNHl, 250-8000 Hz, AU fair SRS AU essentially no change since 2022 REC: ENT f/u, re-eval per MD, f/u with previous recs re: hearing aids

## 2023-09-05 NOTE — HISTORY OF PRESENT ILLNESS
[de-identified] : Ms. Gomez is an 89 year lady who has h/o nose bleeds presents today for difficulty swallowing. When she eats or drinks, she coughs a lot. The food goes down, but she occasionally uses water to help. She feels she needs to stay calm in order to help her swallow.  She recently fell in June and broke her back in 3 places. She was in [facility] hospital and has therapy. She will see her surgeon again for follow-up next week. They told her to do exercises for her swallow at [facility].

## 2023-09-21 ENCOUNTER — TRANSCRIPTION ENCOUNTER (OUTPATIENT)
Age: 88
End: 2023-09-21

## 2024-01-15 ENCOUNTER — EMERGENCY (EMERGENCY)
Facility: HOSPITAL | Age: 89
LOS: 1 days | End: 2024-01-15
Attending: EMERGENCY MEDICINE
Payer: MEDICARE

## 2024-01-15 VITALS
SYSTOLIC BLOOD PRESSURE: 138 MMHG | HEART RATE: 81 BPM | OXYGEN SATURATION: 97 % | HEIGHT: 70 IN | TEMPERATURE: 98 F | WEIGHT: 149.91 LBS | DIASTOLIC BLOOD PRESSURE: 75 MMHG | RESPIRATION RATE: 18 BRPM

## 2024-01-15 LAB
ALBUMIN SERPL ELPH-MCNC: 3.2 G/DL — LOW (ref 3.3–5)
ALP SERPL-CCNC: 62 U/L — SIGNIFICANT CHANGE UP (ref 40–120)
ALT FLD-CCNC: 15 U/L — SIGNIFICANT CHANGE UP (ref 12–78)
ANION GAP SERPL CALC-SCNC: 3 MMOL/L — LOW (ref 5–17)
APPEARANCE UR: CLEAR — SIGNIFICANT CHANGE UP
APTT BLD: 28.9 SEC — SIGNIFICANT CHANGE UP (ref 24.5–35.6)
AST SERPL-CCNC: 13 U/L — LOW (ref 15–37)
BASOPHILS # BLD AUTO: 0.03 K/UL — SIGNIFICANT CHANGE UP (ref 0–0.2)
BASOPHILS NFR BLD AUTO: 0.3 % — SIGNIFICANT CHANGE UP (ref 0–2)
BILIRUB SERPL-MCNC: 0.4 MG/DL — SIGNIFICANT CHANGE UP (ref 0.2–1.2)
BILIRUB UR-MCNC: NEGATIVE — SIGNIFICANT CHANGE UP
BLD GP AB SCN SERPL QL: SIGNIFICANT CHANGE UP
BUN SERPL-MCNC: 18 MG/DL — SIGNIFICANT CHANGE UP (ref 7–23)
CALCIUM SERPL-MCNC: 9.3 MG/DL — SIGNIFICANT CHANGE UP (ref 8.5–10.1)
CHLORIDE SERPL-SCNC: 104 MMOL/L — SIGNIFICANT CHANGE UP (ref 96–108)
CO2 SERPL-SCNC: 27 MMOL/L — SIGNIFICANT CHANGE UP (ref 22–31)
COLOR SPEC: YELLOW — SIGNIFICANT CHANGE UP
CREAT SERPL-MCNC: 0.68 MG/DL — SIGNIFICANT CHANGE UP (ref 0.5–1.3)
DIFF PNL FLD: NEGATIVE — SIGNIFICANT CHANGE UP
EGFR: 83 ML/MIN/1.73M2 — SIGNIFICANT CHANGE UP
EOSINOPHIL # BLD AUTO: 0.22 K/UL — SIGNIFICANT CHANGE UP (ref 0–0.5)
EOSINOPHIL NFR BLD AUTO: 2.4 % — SIGNIFICANT CHANGE UP (ref 0–6)
GLUCOSE SERPL-MCNC: 84 MG/DL — SIGNIFICANT CHANGE UP (ref 70–99)
GLUCOSE UR QL: NEGATIVE MG/DL — SIGNIFICANT CHANGE UP
HCT VFR BLD CALC: 36.8 % — SIGNIFICANT CHANGE UP (ref 34.5–45)
HGB BLD-MCNC: 11.7 G/DL — SIGNIFICANT CHANGE UP (ref 11.5–15.5)
IMM GRANULOCYTES NFR BLD AUTO: 0.4 % — SIGNIFICANT CHANGE UP (ref 0–0.9)
INR BLD: 0.96 RATIO — SIGNIFICANT CHANGE UP (ref 0.85–1.18)
KETONES UR-MCNC: NEGATIVE MG/DL — SIGNIFICANT CHANGE UP
LEUKOCYTE ESTERASE UR-ACNC: NEGATIVE — SIGNIFICANT CHANGE UP
LIDOCAIN IGE QN: 31 U/L — SIGNIFICANT CHANGE UP (ref 13–75)
LYMPHOCYTES # BLD AUTO: 1.55 K/UL — SIGNIFICANT CHANGE UP (ref 1–3.3)
LYMPHOCYTES # BLD AUTO: 17 % — SIGNIFICANT CHANGE UP (ref 13–44)
MCHC RBC-ENTMCNC: 27.9 PG — SIGNIFICANT CHANGE UP (ref 27–34)
MCHC RBC-ENTMCNC: 31.8 GM/DL — LOW (ref 32–36)
MCV RBC AUTO: 87.8 FL — SIGNIFICANT CHANGE UP (ref 80–100)
MONOCYTES # BLD AUTO: 0.62 K/UL — SIGNIFICANT CHANGE UP (ref 0–0.9)
MONOCYTES NFR BLD AUTO: 6.8 % — SIGNIFICANT CHANGE UP (ref 2–14)
NEUTROPHILS # BLD AUTO: 6.67 K/UL — SIGNIFICANT CHANGE UP (ref 1.8–7.4)
NEUTROPHILS NFR BLD AUTO: 73.1 % — SIGNIFICANT CHANGE UP (ref 43–77)
NITRITE UR-MCNC: NEGATIVE — SIGNIFICANT CHANGE UP
NRBC # BLD: 0 /100 WBCS — SIGNIFICANT CHANGE UP (ref 0–0)
OB PNL STL: NEGATIVE — SIGNIFICANT CHANGE UP
PH UR: 5.5 — SIGNIFICANT CHANGE UP (ref 5–8)
PLATELET # BLD AUTO: 206 K/UL — SIGNIFICANT CHANGE UP (ref 150–400)
POTASSIUM SERPL-MCNC: 4.2 MMOL/L — SIGNIFICANT CHANGE UP (ref 3.5–5.3)
POTASSIUM SERPL-SCNC: 4.2 MMOL/L — SIGNIFICANT CHANGE UP (ref 3.5–5.3)
PROT SERPL-MCNC: 7.6 G/DL — SIGNIFICANT CHANGE UP (ref 6–8.3)
PROT UR-MCNC: NEGATIVE MG/DL — SIGNIFICANT CHANGE UP
PROTHROM AB SERPL-ACNC: 11.2 SEC — SIGNIFICANT CHANGE UP (ref 9.5–13)
RBC # BLD: 4.19 M/UL — SIGNIFICANT CHANGE UP (ref 3.8–5.2)
RBC # FLD: 13.3 % — SIGNIFICANT CHANGE UP (ref 10.3–14.5)
SODIUM SERPL-SCNC: 134 MMOL/L — LOW (ref 135–145)
SP GR SPEC: 1.01 — SIGNIFICANT CHANGE UP (ref 1–1.03)
UROBILINOGEN FLD QL: 0.2 MG/DL — SIGNIFICANT CHANGE UP (ref 0.2–1)
WBC # BLD: 9.13 K/UL — SIGNIFICANT CHANGE UP (ref 3.8–10.5)
WBC # FLD AUTO: 9.13 K/UL — SIGNIFICANT CHANGE UP (ref 3.8–10.5)

## 2024-01-15 PROCEDURE — 82272 OCCULT BLD FECES 1-3 TESTS: CPT

## 2024-01-15 PROCEDURE — 86901 BLOOD TYPING SEROLOGIC RH(D): CPT

## 2024-01-15 PROCEDURE — 86850 RBC ANTIBODY SCREEN: CPT

## 2024-01-15 PROCEDURE — 87086 URINE CULTURE/COLONY COUNT: CPT

## 2024-01-15 PROCEDURE — 80053 COMPREHEN METABOLIC PANEL: CPT

## 2024-01-15 PROCEDURE — 74177 CT ABD & PELVIS W/CONTRAST: CPT | Mod: 26,MA

## 2024-01-15 PROCEDURE — 76830 TRANSVAGINAL US NON-OB: CPT | Mod: 26

## 2024-01-15 PROCEDURE — 99285 EMERGENCY DEPT VISIT HI MDM: CPT

## 2024-01-15 PROCEDURE — 36415 COLL VENOUS BLD VENIPUNCTURE: CPT

## 2024-01-15 PROCEDURE — 81003 URINALYSIS AUTO W/O SCOPE: CPT

## 2024-01-15 PROCEDURE — 76830 TRANSVAGINAL US NON-OB: CPT

## 2024-01-15 PROCEDURE — 85025 COMPLETE CBC W/AUTO DIFF WBC: CPT

## 2024-01-15 PROCEDURE — 85610 PROTHROMBIN TIME: CPT

## 2024-01-15 PROCEDURE — 85730 THROMBOPLASTIN TIME PARTIAL: CPT

## 2024-01-15 PROCEDURE — 74177 CT ABD & PELVIS W/CONTRAST: CPT | Mod: MA

## 2024-01-15 PROCEDURE — 86900 BLOOD TYPING SEROLOGIC ABO: CPT

## 2024-01-15 PROCEDURE — 99284 EMERGENCY DEPT VISIT MOD MDM: CPT | Mod: 25

## 2024-01-15 PROCEDURE — 83690 ASSAY OF LIPASE: CPT

## 2024-01-15 RX ORDER — SODIUM CHLORIDE 9 MG/ML
1000 INJECTION INTRAMUSCULAR; INTRAVENOUS; SUBCUTANEOUS ONCE
Refills: 0 | Status: COMPLETED | OUTPATIENT
Start: 2024-01-15 | End: 2024-01-15

## 2024-01-15 RX ADMIN — SODIUM CHLORIDE 1000 MILLILITER(S): 9 INJECTION INTRAMUSCULAR; INTRAVENOUS; SUBCUTANEOUS at 17:22

## 2024-01-15 NOTE — ED ADULT TRIAGE NOTE - CHIEF COMPLAINT QUOTE
patient to triage a&ox4 with c/o vaginal bleeding since last night. reports it started last night, stopped, and started again today moderate amount with blood clots present. denies burning with urination. has some lower abdominal discomfort. no N/V/D

## 2024-01-15 NOTE — ED PROVIDER NOTE - PATIENT PORTAL LINK FT
You can access the FollowMyHealth Patient Portal offered by [facility] by registering at the following website: [email or web address]. By joining [employer]’s FollowMyHealth portal, you will also be able to view your health information using other applications (apps) compatible with our system.

## 2024-01-15 NOTE — ED PROVIDER NOTE - NSICDXPASTMEDICALHX_GEN_ALL_CORE_FT
PAST MEDICAL HISTORY:  Diabetes     HTN (hypertension)     Hyperlipidemia, unspecified hyperlipidemia type     Hypertension, unspecified type     Stented coronary artery

## 2024-01-15 NOTE — ED PROVIDER NOTE - CLINICAL SUMMARY MEDICAL DECISION MAKING FREE TEXT BOX
89-year-old female complaining of vaginal bleeding, on examination no active bleeding noted, Chirinos catheter to be placed to differentiate between hematuria versus vaginal bleeding, will obtain CT abdomen and pelvis, ultrasound pelvis, send CBC, CMP, coagulation studies, type and screen, urine analysis, IV fluids and reevaluate.

## 2024-01-15 NOTE — ED PROVIDER NOTE - PROGRESS NOTE DETAILS
Labs, ultrasound, CT scan, discussed with patient, son, and daughter, no acute findings, while patient in the ER no evidence of any bleeding, patient has her own GYN to follow-up with, will call tomorrow to make an appointment, will be discharged and given copies of all results.  Instructed to return to the emergency department if having return of bleeding and or worsening of symptoms

## 2024-01-15 NOTE — ED ADULT NURSE NOTE - OBJECTIVE STATEMENT
patient to triage a&ox4 with c/o vaginal bleeding since last night. reports it started last night, stopped, and started again today moderate amount with blood clots present. denies burning with urination. has some lower abdominal discomfort. no N/V/D. Dr Escalona at bedside. labs sent and chacon placed. patient with clear yellow urine output noted

## 2024-01-15 NOTE — ED PROVIDER NOTE - OBJECTIVE STATEMENT
89-year-old female PMH of HTN, HLD, DM2, CAD with cardiac stent, presents to the emergency department with son at the bedside states that yesterday patient experienced vaginal bleeding with clots, seems to be intermittent and getting worse today, patient states she has lower abdominal fullness, no vomiting no fever, no urinary complaints.

## 2024-01-15 NOTE — ED PROVIDER NOTE - NSFOLLOWUPINSTRUCTIONS_ED_ALL_ED_FT
Postmenopausal bleeding is any bleeding that a woman has after she has entered menopause. Menopause is the end of a woman's fertile years. After menopause, a woman no longer ovulates and does not have menstrual periods. Therefore, she should no longer have bleeding from her vagina.    Postmenopausal bleeding may have various causes, including:  Menopausal hormone therapy (MHT).  Endometrial atrophy. After menopause, low estrogen hormone levels cause the membrane that lines the uterus (endometrium) to become thin. You may have bleeding as the endometrium thins.  Endometrial hyperplasia. This condition is caused by excess estrogen hormones and low levels of progesterone hormones. The excess estrogen causes the endometrium to thicken, which can lead to bleeding. In some cases, this can lead to cancer of the uterus.  Endometrial cancer.  Noncancerous growths (polyps) on the endometrium, the lining of the uterus, or the cervix.  Uterine fibroids. These are noncancerous growths in or around the uterus muscle tissue that can cause heavy bleeding.  Any type of postmenopausal bleeding, even if it appears to be a typical menstrual period, should be checked by your health care provider. Treatment will depend on the cause of the bleeding.    Follow these instructions at home:    Pay attention to any changes in your symptoms. Let your health care provider know about them.  Avoid using tampons and douches as told by your health care provider.  Change your pads regularly.  Get regular pelvic exams, including Pap tests, as told by your health care provider.  Take iron supplements as told by your health care provider.  Take over-the-counter and prescription medicines only as told by your health care provider.  Keep all follow-up visits. This is important.  Contact a health care provider if:  You have new bleeding from the vagina after menopause.  You have pain in your abdomen.  Get help right away if:  You have a fever or chills.  You have severe pain with bleeding.  You are passing blood clots.  You have heavy bleeding, need more than 1 pad an hour, and have never experienced this before.  You have headaches or feel faint or dizzy.  Summary  Postmenopausal bleeding is any bleeding that a woman has after she has entered into menopause.  Postmenopausal bleeding may have various causes. Treatment will depend on the cause of the bleeding.  Any type of postmenopausal bleeding, even if it appears to be a typical menstrual period, should be checked by your health care provider.  Be sure to pay attention to any changes in your symptoms and keep all follow-up visits.  This information is not intended to replace advice given to you by your health care provider. Make sure you discuss any questions you have with your health care provider.    Document Revised: 06/03/2021 Document Reviewed: 06/03/2021

## 2024-01-15 NOTE — ED PROVIDER NOTE - CARE PROVIDER_API CALL
Vu Gallardo  Obstetrics and Gynecology  [address], [address]  [place] [address]  Phone: [phone]  Fax: [phone]  Follow Up Time:

## 2024-01-15 NOTE — ED ADULT NURSE REASSESSMENT NOTE - NS ED NURSE REASSESS COMMENT FT1
Pt urine cath removed and IV cath removed, pt tolerated well - dc paperwork given to son - pt to make gyn apt tomorrow -  priyanka cerda

## 2024-01-16 LAB
CULTURE RESULTS: NO GROWTH — SIGNIFICANT CHANGE UP
SPECIMEN SOURCE: SIGNIFICANT CHANGE UP

## 2024-01-25 NOTE — CONSULT NOTE ADULT - ASSESSMENT
Goal Outcome Evaluation:  Plan of Care Reviewed With: patient        Progress: improving  Outcome Evaluation: pt practiced sit <> stand x3 reps from recliner with maxA x2 progressing to modA x2 with max cues for hand placement on walker. Pt incontinent of bowels with standing. Pt remains below baseline level of function for mobility and IPPT services continue to be warranted at this time.                                Karen Gomez is an 88 year old RH female with a past medical history of HTN, HLD, DM, CAD with stents who is presenting as a transfer from [place] for evaluation of possible seizure episode.    Impression: LOC episode with prodrome of darkening of vision, sweating, nausea with history of significant cardiovascular risk factors concerning for syncope. Cannot fully rule out seizure as has history of seizure, is on Wellbutrin, with questionable urinary incontinence (however may be present with syncope episodes), and confusion after episode with possible epigastric sensation more likely to be localized to a temporal lobe.    Recs:  [] orthostatics  [] syncope workup including TTE  [] may need tilt table test  [] vEEG (24 hour study)  [] remain off seizure medications for now  [] if patient has a convulsion, please document accurately the length of the episode, and specifically what the patient was doing paying attention to eye opening vs closure, gaze deviation, shaking of extremities, tongue bite, urinary incontinence, any derangement of vital signs.  Generalized motor seizures can have dilated and unreactive pupils. absent oculocephalic reflexes (no dolls eyes), and open eyelids (99% of cases). Head turning from sided to side, pelvic thrusting, bicycling movements, hand waving are NOT manifestations of generalized motor seizures.   [] upon discharge may follow up at [address] [place] [address] for neurology ([phone])    Patient to be seen and discussed with neurology attending, Dr. Zepeda.

## 2024-02-06 ENCOUNTER — APPOINTMENT (OUTPATIENT)
Dept: OTOLARYNGOLOGY | Facility: CLINIC | Age: 89
End: 2024-02-06
Payer: MEDICARE

## 2024-02-06 VITALS
SYSTOLIC BLOOD PRESSURE: 116 MMHG | HEIGHT: 60 IN | BODY MASS INDEX: 29.64 KG/M2 | HEART RATE: 82 BPM | WEIGHT: 151 LBS | DIASTOLIC BLOOD PRESSURE: 67 MMHG

## 2024-02-06 DIAGNOSIS — Z86.39 PERSONAL HISTORY OF OTHER ENDOCRINE, NUTRITIONAL AND METABOLIC DISEASE: ICD-10-CM

## 2024-02-06 DIAGNOSIS — Z83.3 FAMILY HISTORY OF DIABETES MELLITUS: ICD-10-CM

## 2024-02-06 DIAGNOSIS — Z86.79 PERSONAL HISTORY OF OTHER DISEASES OF THE CIRCULATORY SYSTEM: ICD-10-CM

## 2024-02-06 DIAGNOSIS — H61.23 IMPACTED CERUMEN, BILATERAL: ICD-10-CM

## 2024-02-06 DIAGNOSIS — H90.5 UNSPECIFIED SENSORINEURAL HEARING LOSS: ICD-10-CM

## 2024-02-06 PROCEDURE — 99213 OFFICE O/P EST LOW 20 MIN: CPT | Mod: 25

## 2024-02-06 PROCEDURE — 69210 REMOVE IMPACTED EAR WAX UNI: CPT

## 2024-02-06 NOTE — HISTORY OF PRESENT ILLNESS
[de-identified] : 89 yr old female w MORIAH, not aided, was told she has wax + vertigo on and off for a few years +tinnitus

## 2024-02-06 NOTE — PHYSICAL EXAM
[de-identified] : CI AU [Normal] : mucosa is normal [Midline] : trachea located in midline position

## 2024-02-06 NOTE — REASON FOR VISIT
[Subsequent Evaluation] : a subsequent evaluation for [FreeTextEntry2] : Wax removal and can't smell

## 2024-03-04 ENCOUNTER — INPATIENT (INPATIENT)
Facility: HOSPITAL | Age: 89
LOS: 2 days | Discharge: HOME | DRG: 690 | End: 2024-03-07
Attending: FAMILY MEDICINE | Admitting: STUDENT IN AN ORGANIZED HEALTH CARE EDUCATION/TRAINING PROGRAM
Payer: MEDICARE

## 2024-03-04 ENCOUNTER — APPOINTMENT (OUTPATIENT)
Dept: GASTROENTEROLOGY | Facility: CLINIC | Age: 89
End: 2024-03-04
Payer: MEDICARE

## 2024-03-04 ENCOUNTER — APPOINTMENT (OUTPATIENT)
Dept: UROGYNECOLOGY | Facility: CLINIC | Age: 89
End: 2024-03-04
Payer: MEDICARE

## 2024-03-04 VITALS
TEMPERATURE: 98 F | SYSTOLIC BLOOD PRESSURE: 130 MMHG | HEIGHT: 70 IN | HEART RATE: 74 BPM | DIASTOLIC BLOOD PRESSURE: 60 MMHG | OXYGEN SATURATION: 98 % | RESPIRATION RATE: 16 BRPM

## 2024-03-04 VITALS
WEIGHT: 147 LBS | BODY MASS INDEX: 29.64 KG/M2 | DIASTOLIC BLOOD PRESSURE: 93 MMHG | HEIGHT: 59 IN | SYSTOLIC BLOOD PRESSURE: 148 MMHG

## 2024-03-04 VITALS
DIASTOLIC BLOOD PRESSURE: 70 MMHG | OXYGEN SATURATION: 96 % | SYSTOLIC BLOOD PRESSURE: 150 MMHG | WEIGHT: 146 LBS | HEART RATE: 89 BPM | BODY MASS INDEX: 28.66 KG/M2 | HEIGHT: 60 IN | RESPIRATION RATE: 18 BRPM

## 2024-03-04 DIAGNOSIS — N39.490 OVERFLOW INCONTINENCE: ICD-10-CM

## 2024-03-04 DIAGNOSIS — R30.0 DYSURIA: ICD-10-CM

## 2024-03-04 DIAGNOSIS — R11.11 VOMITING W/OUT NAUSEA: ICD-10-CM

## 2024-03-04 LAB
ALBUMIN SERPL ELPH-MCNC: 2.7 G/DL — LOW (ref 3.3–5)
ALP SERPL-CCNC: 58 U/L — SIGNIFICANT CHANGE UP (ref 40–120)
ALT FLD-CCNC: 14 U/L — SIGNIFICANT CHANGE UP (ref 12–78)
ANION GAP SERPL CALC-SCNC: 9 MMOL/L — SIGNIFICANT CHANGE UP (ref 5–17)
APPEARANCE UR: ABNORMAL
APTT BLD: 26.8 SEC — SIGNIFICANT CHANGE UP (ref 24.5–35.6)
AST SERPL-CCNC: 14 U/L — LOW (ref 15–37)
BASOPHILS # BLD AUTO: 0.02 K/UL — SIGNIFICANT CHANGE UP (ref 0–0.2)
BASOPHILS NFR BLD AUTO: 0.2 % — SIGNIFICANT CHANGE UP (ref 0–2)
BILIRUB SERPL-MCNC: 0.3 MG/DL — SIGNIFICANT CHANGE UP (ref 0.2–1.2)
BILIRUB UR-MCNC: NEGATIVE — SIGNIFICANT CHANGE UP
BUN SERPL-MCNC: 26 MG/DL — HIGH (ref 7–23)
CALCIUM SERPL-MCNC: 9 MG/DL — SIGNIFICANT CHANGE UP (ref 8.5–10.1)
CHLORIDE SERPL-SCNC: 105 MMOL/L — SIGNIFICANT CHANGE UP (ref 96–108)
CK MB BLD-MCNC: 3.5 % — SIGNIFICANT CHANGE UP (ref 0–3.5)
CK MB CFR SERPL CALC: 1.7 NG/ML — SIGNIFICANT CHANGE UP (ref 0–3.6)
CK SERPL-CCNC: 48 U/L — SIGNIFICANT CHANGE UP (ref 26–192)
CO2 SERPL-SCNC: 21 MMOL/L — LOW (ref 22–31)
COLOR SPEC: YELLOW — SIGNIFICANT CHANGE UP
CREAT SERPL-MCNC: 0.87 MG/DL — SIGNIFICANT CHANGE UP (ref 0.5–1.3)
DIFF PNL FLD: ABNORMAL
EGFR: 64 ML/MIN/1.73M2 — SIGNIFICANT CHANGE UP
EOSINOPHIL # BLD AUTO: 0.06 K/UL — SIGNIFICANT CHANGE UP (ref 0–0.5)
EOSINOPHIL NFR BLD AUTO: 0.7 % — SIGNIFICANT CHANGE UP (ref 0–6)
FLUAV AG NPH QL: SIGNIFICANT CHANGE UP
FLUBV AG NPH QL: SIGNIFICANT CHANGE UP
GLUCOSE SERPL-MCNC: 238 MG/DL — HIGH (ref 70–99)
GLUCOSE UR QL: 100 MG/DL
HCT VFR BLD CALC: 35.9 % — SIGNIFICANT CHANGE UP (ref 34.5–45)
HGB BLD-MCNC: 11.6 G/DL — SIGNIFICANT CHANGE UP (ref 11.5–15.5)
IMM GRANULOCYTES NFR BLD AUTO: 0.3 % — SIGNIFICANT CHANGE UP (ref 0–0.9)
INR BLD: 1 RATIO — SIGNIFICANT CHANGE UP (ref 0.85–1.18)
KETONES UR-MCNC: NEGATIVE MG/DL — SIGNIFICANT CHANGE UP
LACTATE SERPL-SCNC: 0.8 MMOL/L — SIGNIFICANT CHANGE UP (ref 0.7–2)
LACTATE SERPL-SCNC: 2.5 MMOL/L — HIGH (ref 0.7–2)
LEUKOCYTE ESTERASE UR-ACNC: ABNORMAL
LIDOCAIN IGE QN: 79 U/L — HIGH (ref 13–75)
LYMPHOCYTES # BLD AUTO: 0.97 K/UL — LOW (ref 1–3.3)
LYMPHOCYTES # BLD AUTO: 10.8 % — LOW (ref 13–44)
MAGNESIUM SERPL-MCNC: 1 MG/DL — CRITICAL LOW (ref 1.6–2.6)
MCHC RBC-ENTMCNC: 28.2 PG — SIGNIFICANT CHANGE UP (ref 27–34)
MCHC RBC-ENTMCNC: 32.3 GM/DL — SIGNIFICANT CHANGE UP (ref 32–36)
MCV RBC AUTO: 87.1 FL — SIGNIFICANT CHANGE UP (ref 80–100)
MONOCYTES # BLD AUTO: 0.72 K/UL — SIGNIFICANT CHANGE UP (ref 0–0.9)
MONOCYTES NFR BLD AUTO: 8 % — SIGNIFICANT CHANGE UP (ref 2–14)
NEUTROPHILS # BLD AUTO: 7.16 K/UL — SIGNIFICANT CHANGE UP (ref 1.8–7.4)
NEUTROPHILS NFR BLD AUTO: 80 % — HIGH (ref 43–77)
NITRITE UR-MCNC: NEGATIVE — SIGNIFICANT CHANGE UP
NRBC # BLD: 0 /100 WBCS — SIGNIFICANT CHANGE UP (ref 0–0)
NT-PROBNP SERPL-SCNC: 774 PG/ML — HIGH (ref 0–450)
PH UR: 6 — SIGNIFICANT CHANGE UP (ref 5–8)
PLATELET # BLD AUTO: 270 K/UL — SIGNIFICANT CHANGE UP (ref 150–400)
POTASSIUM SERPL-MCNC: 5.3 MMOL/L — SIGNIFICANT CHANGE UP (ref 3.5–5.3)
POTASSIUM SERPL-SCNC: 5.3 MMOL/L — SIGNIFICANT CHANGE UP (ref 3.5–5.3)
PROT SERPL-MCNC: 7.7 G/DL — SIGNIFICANT CHANGE UP (ref 6–8.3)
PROT UR-MCNC: 300 MG/DL
PROTHROM AB SERPL-ACNC: 11.7 SEC — SIGNIFICANT CHANGE UP (ref 9.5–13)
RBC # BLD: 4.12 M/UL — SIGNIFICANT CHANGE UP (ref 3.8–5.2)
RBC # FLD: 13 % — SIGNIFICANT CHANGE UP (ref 10.3–14.5)
RSV RNA NPH QL NAA+NON-PROBE: SIGNIFICANT CHANGE UP
SARS-COV-2 RNA SPEC QL NAA+PROBE: SIGNIFICANT CHANGE UP
SODIUM SERPL-SCNC: 135 MMOL/L — SIGNIFICANT CHANGE UP (ref 135–145)
SP GR SPEC: 1.05 — HIGH (ref 1–1.03)
TROPONIN I, HIGH SENSITIVITY RESULT: 15.4 NG/L — SIGNIFICANT CHANGE UP
TSH SERPL-MCNC: 2.02 UIU/ML — SIGNIFICANT CHANGE UP (ref 0.36–3.74)
UROBILINOGEN FLD QL: 0.2 MG/DL — SIGNIFICANT CHANGE UP (ref 0.2–1)
WBC # BLD: 8.96 K/UL — SIGNIFICANT CHANGE UP (ref 3.8–10.5)
WBC # FLD AUTO: 8.96 K/UL — SIGNIFICANT CHANGE UP (ref 3.8–10.5)

## 2024-03-04 PROCEDURE — 71275 CT ANGIOGRAPHY CHEST: CPT | Mod: 26,MC

## 2024-03-04 PROCEDURE — 71045 X-RAY EXAM CHEST 1 VIEW: CPT | Mod: 26

## 2024-03-04 PROCEDURE — 51701 INSERT BLADDER CATHETER: CPT

## 2024-03-04 PROCEDURE — 93010 ELECTROCARDIOGRAM REPORT: CPT

## 2024-03-04 PROCEDURE — 99204 OFFICE O/P NEW MOD 45 MIN: CPT

## 2024-03-04 PROCEDURE — 70450 CT HEAD/BRAIN W/O DYE: CPT | Mod: 26,MC

## 2024-03-04 PROCEDURE — 99204 OFFICE O/P NEW MOD 45 MIN: CPT | Mod: 25

## 2024-03-04 PROCEDURE — 99285 EMERGENCY DEPT VISIT HI MDM: CPT

## 2024-03-04 PROCEDURE — 74177 CT ABD & PELVIS W/CONTRAST: CPT | Mod: 26,MC

## 2024-03-04 RX ORDER — ACETAMINOPHEN 500 MG
1000 TABLET ORAL ONCE
Refills: 0 | Status: COMPLETED | OUTPATIENT
Start: 2024-03-04 | End: 2024-03-04

## 2024-03-04 RX ORDER — FAMOTIDINE 10 MG/ML
20 INJECTION INTRAVENOUS ONCE
Refills: 0 | Status: COMPLETED | OUTPATIENT
Start: 2024-03-04 | End: 2024-03-04

## 2024-03-04 RX ORDER — CEFTRIAXONE 500 MG/1
1000 INJECTION, POWDER, FOR SOLUTION INTRAMUSCULAR; INTRAVENOUS ONCE
Refills: 0 | Status: COMPLETED | OUTPATIENT
Start: 2024-03-04 | End: 2024-03-04

## 2024-03-04 RX ORDER — ONDANSETRON 8 MG/1
4 TABLET, FILM COATED ORAL ONCE
Refills: 0 | Status: COMPLETED | OUTPATIENT
Start: 2024-03-04 | End: 2024-03-04

## 2024-03-04 RX ORDER — MAGNESIUM SULFATE 500 MG/ML
2 VIAL (ML) INJECTION ONCE
Refills: 0 | Status: COMPLETED | OUTPATIENT
Start: 2024-03-04 | End: 2024-03-04

## 2024-03-04 RX ORDER — SODIUM CHLORIDE 9 MG/ML
1000 INJECTION INTRAMUSCULAR; INTRAVENOUS; SUBCUTANEOUS ONCE
Refills: 0 | Status: COMPLETED | OUTPATIENT
Start: 2024-03-04 | End: 2024-03-04

## 2024-03-04 RX ADMIN — Medication 400 MILLIGRAM(S): at 19:44

## 2024-03-04 RX ADMIN — CEFTRIAXONE 100 MILLIGRAM(S): 500 INJECTION, POWDER, FOR SOLUTION INTRAMUSCULAR; INTRAVENOUS at 23:41

## 2024-03-04 RX ADMIN — Medication 25 GRAM(S): at 22:10

## 2024-03-04 RX ADMIN — ONDANSETRON 4 MILLIGRAM(S): 8 TABLET, FILM COATED ORAL at 19:44

## 2024-03-04 RX ADMIN — FAMOTIDINE 20 MILLIGRAM(S): 10 INJECTION INTRAVENOUS at 19:44

## 2024-03-04 RX ADMIN — SODIUM CHLORIDE 1000 MILLILITER(S): 9 INJECTION INTRAMUSCULAR; INTRAVENOUS; SUBCUTANEOUS at 19:45

## 2024-03-04 NOTE — HISTORY OF PRESENT ILLNESS
[FreeTextEntry1] : 89 yo female with h/o CAD wtih stent on asa,  c/o n/v for months, 2 times a week last month, patient tolerated breakfast today.  Last bm today, soft stool brown. urinary incontienence.  gradula weight loss. no h/o brbpr, no melena. no hematemesis.  h/o EGD in 02/2021  per daughter pillesophagitis,

## 2024-03-04 NOTE — ASSESSMENT
[FreeTextEntry1] : 1. intermittent vomiting, not weekly ddx; gastritis, pud ,,gastroparesis etc. patient on ppi noncontrast ct scan done last month unrevealing given patient age and h/o CAD with stent recommend conservative mangement, patient does not want to pursue EGD at this time  plan UGI series  2. chronic constipation  plan miralax daily,half capful

## 2024-03-04 NOTE — ED ADULT NURSE NOTE - CHIEF COMPLAINT QUOTE
BI PAP      MACHINE PRE SET, PT DOES NOT KNOW SETTINGS    Vitamin B12 deficiency     Vitamin D deficiency      Past Surgical History:   Procedure Laterality Date    ABDOMEN SURGERY  04/2013    panniculectomy with abdominoplasty    ANKLE SURGERY Bilateral     X2    BREAST REDUCTION SURGERY Bilateral     BREAST SURGERY Left 06/2013    needle lumpectomy, fibrocystic changes    CHOLECYSTECTOMY, LAPAROSCOPIC      HERNIA REPAIR      x2, Sankovic  x1, Generalovich    HYSTERECTOMY      LIPECTOMY      RECTAL PROLAPSE REPAIR      TONSILLECTOMY       Family History   Problem Relation Age of Onset    Breast Cancer Mother     Diabetes type 2  Father     Other Other         no siblings     Social History     Socioeconomic History    Marital status:      Spouse name: Not on file    Number of children: Not on file    Years of education: Not on file    Highest education level: Not on file   Occupational History    Occupation: retired [employer]: Not on file    Food insecurity     Worry: Not on file     Inability: Not on file   [employer]View needs     Medical: Not on file     Non-medical: Not on file   Tobacco Use    Smoking status: Never Smoker    Smokeless tobacco: Never Used   Substance and Sexual Activity    Alcohol use: No    Drug use: No    Sexual activity: Not on file   Lifestyle    Physical activity     Days per week: Not on file     Minutes per session: Not on file    Stress: Not on file   Relationships    Social connections     Talks on phone: Not on file     Gets together: Not on file     Attends Synagogue service: Not on file     Active member of club or organization: Not on file     Attends meetings of clubs or organizations: Not on file     Relationship status: Not on file    Intimate partner violence     Fear of current or ex partner: Not on file     Emotionally abused: Not on file     Physically abused: Not on file Forced sexual activity: Not on file   Other Topics Concern    Not on file   Social History Narrative    Not on file       Vitals:    03/12/20 1309   BP: 123/78   Temp: 98.2 °F (36.8 °C)   TempSrc: Temporal   Weight: 194 lb (88 kg)   Height: 5' 1\" (1.549 m)       Focused Lower Extremity Physical Exam:  Vitals:    03/12/20 1309   BP: 123/78   Temp: 98.2 °F (36.8 °C)        Foot Exam     Ortho Exam    Vascular: pulses  dp  pt    Capillary Refill Time:   Hair growth  Skin:    Edema:    Neurologic:      Musculoskeletal/ Orthopedic examination: Orthopedic examination revealed negative pain with palpation at the base of the fifth metatarsal there is negative pain with inversion eversion or dorsiflexion. New x-rays revealed showing no fracture in that fifth metatarsal  NAIL   Web space   Derm: Bilateral lower legs feet arms there is a pruritic rash small red raised lesions nondraining nonerythematous noninfected        Assessment and Plan: Today the patient for the dermatitis is obviously a stop using the new moisturizing cream she is using on her legs and feet I did advise her to clean all her clothes with Dreft soap for 1 week if this does not help then I will call her in prednisone as for the left foot the fracture appears to be healed she is to resume all activities including icing after the walking for long periods of time we will reevaluate she still having some issues down the road possibly an injection  Marko Federico was seen today for foot injury and toe pain. Diagnoses and all orders for this visit:    Closed fracture of left foot with routine healing, subsequent encounter  -     XR FOOT LEFT (MIN 3 VIEWS); Future    Dermatitis due to detergents    Pain in left foot        Return in about 1 month (around 4/12/2020). Seen By:  Emperatriz Mcmahan DPM      Document was created using voice recognition software. Note was reviewed, however may contain grammatical errors. Patient is from home for weakness and vomiting.

## 2024-03-04 NOTE — ED PROVIDER NOTE - OBJECTIVE STATEMENT
The patient is a 89y Female with pmhx of DM2, gout, CAD (s/p 1 stent in Oct. 2019), HTN, anxiety/depression presenting with one day of weakness, abdominal pain, pain with urination, and n/v. Per family at bedside, patient was taken to multiple doctors' appointments today (urogynecologist and gastroenterology). Between appointments, patient started to feel very weak and could no longer stand up. Pt became more lethargic and weak throughout the day. Pt normally ambulates with a cane. During the urogynecology appointment, patient began to haves chills. Pt has had chronic urinary incontinence for the last few months, is supposed to have a pessary placed in the future. Pt currently endorses generalized weakness, lower abd pain, and burning with urination. Says she had one episode of NBNB vomiting earlier today, still feels a bit nauseous. Denies HA, vision changes, CP, SOB, leg swelling, back pain. NKDA.

## 2024-03-04 NOTE — PHYSICAL EXAM
[Alert] : alert [Healthy Appearing] : healthy appearing [Sclera] : the sclera and conjunctiva were normal [Hearing Threshold Finger Rub Not [place]] : hearing was normal [Normal Appearance] : the appearance of the neck was normal [No Respiratory Distress] : no respiratory distress [Auscultation Breath Sounds / Voice Sounds] : lungs were clear to auscultation bilaterally [Normal S1, S2] : normal S1 and S2 [Bowel Sounds] : normal bowel sounds [Abdomen Tenderness] : non-tender [Abdomen Soft] : soft [No CVA Tenderness] : no CVA  tenderness [] : no rash [No Focal Deficits] : no focal deficits [Oriented To Time, Place, And Person] : oriented to person, place, and time [de-identified] : distended , non tender [de-identified] : uses cane and wheelchair

## 2024-03-04 NOTE — ED ADULT NURSE NOTE - NSFALLLASTSIX_ED_ALL_ED
Reference #: [id number]    Patient Demographic Information (PDI)       PDI	First Name	Last Name	Birth Date	Gender	Street Address	[facility]	Zip Code  ROMINA Dorsey	1967	Female	[address]	[address]    Prescription Information      PDI Filter:    PDI	Current Rx	Drug Type	Rx Written	Rx Dispensed	Drug	Quantity	Days Supply	Prescriber Name	Prescriber JODY #	Payment Method	Dispenser  A	N		05/01/2023	05/05/2023	zolpidem tartrate 10 mg tablet	30	30	Brittanie Sage [id number]	Insurance	[id number]  A	N		03/31/2023	04/08/2023	zolpidem tartrate 10 mg tablet	30	30	Brittanie Sage [id number]	Insurance	[facility] #[id number]  A	N		02/03/2023	02/08/2023	zolpidem tartrate 10 mg tablet	30	30	Brittanie Sage [id number]	Insurance	[facility] #[id number]  A	N		10/07/2022	10/13/2022	zolpidem tartrate 10 mg tablet	30	30	Brittanie Sage [id number]	Insurance	[facility] #[id number]      Annette Douglass MD  Division of Hospital Medicine  Reachable on MS Teams No.

## 2024-03-04 NOTE — ED PROVIDER NOTE - PHYSICAL EXAMINATION
GEN - NAD, well appearing, A&Ox3  HEAD - NC/AT  EYES - PERRL, EOMI  ENT - Airway patent, +mucous membranes dry  NECK - Supple, non-tender without lymphadenopathy, no masses  PULMONARY - CTA b/l, symmetric breath sounds, no W/R/R  CARDIAC - +S1S2, RRR, no M/G/R, no JVD  ABDOMEN - +BS, ND, +TTP in b/l lower abd, soft, no guarding, no rebound, no masses, no rigidity   - No CVA TTP b/l  BACK - No midline tenderness  EXTREMITIES - FROM, symmetric pulses, no edema, 5/5 strength in b/l UE and LE. B/l calves NT without any swelling, erythema, or warmth to touch.  SKIN - No rash or bruising  NEUROLOGIC - Alert, speech clear, CN II-XII grossly intact, no pronator drift, +unable to assess gait at this time, strength and sensation grossly intact, FTN negative b/l  PSYCH - Normal mood/affect, normal insight

## 2024-03-04 NOTE — ED ADULT NURSE NOTE - NSFALLHARMRISKINTERV_ED_ALL_ED
Assistance OOB with selected safe patient handling equipment if applicable/Communicate risk of Fall with Harm to all staff, patient, and family/Provide visual cue: red socks, yellow wristband, yellow gown, etc/Reinforce activity limits and safety measures with patient and family/Bed in lowest position, wheels locked, appropriate side rails in place/Call bell, personal items and telephone in reach/Instruct patient to call for assistance before getting out of bed/chair/stretcher/Non-slip footwear applied when patient is off stretcher/[place] to call system/Physically safe environment - no spills, clutter or unnecessary equipment/Purposeful Proactive Rounding/Room/bathroom lighting operational, light cord in reach

## 2024-03-04 NOTE — REVIEW OF SYSTEMS
[Fever] : no fever [Red Eyes] : eyes not red [Sore Throat] : no sore throat [Chest Pain] : no chest pain

## 2024-03-04 NOTE — ED PROVIDER NOTE - CLINICAL SUMMARY MEDICAL DECISION MAKING FREE TEXT BOX
Rome Narayan MD (Attending Physician): The patient is a 89y Female with pmhx of DM2, gout, CAD (s/p 1 stent in Oct. 2019), HTN, anxiety/depression presenting with one day of weakness, abdominal pain, pain with urination, and n/v. DDx includes, but not limited to: intracranial bleed, PNA, PE, ACS, PTX, CHF, pancreatitis, appendicitis, kidney stone, UTI, electrolyte derangement, anemia, dehydration, gastritis, viral syndrome. Low suspicion for spinal cord compression. ekg, cxr, CT head, CTA chest, CT a/p, labs, urine, zofran, pepcid, tylenol, IVF. Will most likely require admission, as pt cannot ambulate.

## 2024-03-04 NOTE — ED ADULT NURSE NOTE - PATIENT'S PREFERRED PRONOUN
Pt reports to the clinic for plugged ears, suspect cerumen impaction.  They state they are in their usual state of health without any fevers, chills.  They have no current pain or drainage. She has hx of bilateral tinnitus L>R.  She hasn' jose any major changes in this but it continues to bother her.     Patient appears stated age and is in no acute distress.  Mood and affect are normal.  We focus on the patient's ears under the operating microscope.  External ears are normal bilaterally.  Both canals are impacted with cerumen.  Wax is disimpacted under the operating microscope using multiple microscopic instruments with a moderate amount of difficulty.  A good amount of time was spent.  After disimpaction we see normal canals,  normal ear drums..     2019 hearing test shows bilateral SNHL.    Impression/Plan:  Cerumen impaction, bilateral - avoid the use of Q-tips and follow up on a 1 yr basis.    She has hx of bilateral SNHL and tinnitus. It has been 2 yrs since her last hearing test - rec retesting.     Her/She

## 2024-03-05 DIAGNOSIS — I10 ESSENTIAL (PRIMARY) HYPERTENSION: ICD-10-CM

## 2024-03-05 DIAGNOSIS — Z86.79 PERSONAL HISTORY OF OTHER DISEASES OF THE CIRCULATORY SYSTEM: ICD-10-CM

## 2024-03-05 DIAGNOSIS — I25.10 ATHEROSCLEROTIC HEART DISEASE OF NATIVE CORONARY ARTERY WITHOUT ANGINA PECTORIS: ICD-10-CM

## 2024-03-05 DIAGNOSIS — F41.9 ANXIETY DISORDER, UNSPECIFIED: ICD-10-CM

## 2024-03-05 DIAGNOSIS — M10.9 GOUT, UNSPECIFIED: ICD-10-CM

## 2024-03-05 DIAGNOSIS — N39.0 URINARY TRACT INFECTION, SITE NOT SPECIFIED: ICD-10-CM

## 2024-03-05 DIAGNOSIS — R91.8 OTHER NONSPECIFIC ABNORMAL FINDING OF LUNG FIELD: ICD-10-CM

## 2024-03-05 DIAGNOSIS — Z29.9 ENCOUNTER FOR PROPHYLACTIC MEASURES, UNSPECIFIED: ICD-10-CM

## 2024-03-05 DIAGNOSIS — E11.9 TYPE 2 DIABETES MELLITUS WITHOUT COMPLICATIONS: ICD-10-CM

## 2024-03-05 LAB
A1C WITH ESTIMATED AVERAGE GLUCOSE RESULT: 7.6 % — HIGH (ref 4–5.6)
ALBUMIN SERPL ELPH-MCNC: 2.4 G/DL — LOW (ref 3.3–5)
ALP SERPL-CCNC: 47 U/L — SIGNIFICANT CHANGE UP (ref 40–120)
ALT FLD-CCNC: 10 U/L — LOW (ref 12–78)
ANION GAP SERPL CALC-SCNC: 5 MMOL/L — SIGNIFICANT CHANGE UP (ref 5–17)
APTT BLD: 27.1 SEC — SIGNIFICANT CHANGE UP (ref 24.5–35.6)
AST SERPL-CCNC: 7 U/L — LOW (ref 15–37)
BASOPHILS # BLD AUTO: 0.04 K/UL — SIGNIFICANT CHANGE UP (ref 0–0.2)
BASOPHILS NFR BLD AUTO: 0.4 % — SIGNIFICANT CHANGE UP (ref 0–2)
BILIRUB SERPL-MCNC: 0.3 MG/DL — SIGNIFICANT CHANGE UP (ref 0.2–1.2)
BUN SERPL-MCNC: 18 MG/DL — SIGNIFICANT CHANGE UP (ref 7–23)
CALCIUM SERPL-MCNC: 8.1 MG/DL — LOW (ref 8.5–10.1)
CHLORIDE SERPL-SCNC: 111 MMOL/L — HIGH (ref 96–108)
CHOLEST SERPL-MCNC: 84 MG/DL — SIGNIFICANT CHANGE UP
CO2 SERPL-SCNC: 25 MMOL/L — SIGNIFICANT CHANGE UP (ref 22–31)
CREAT SERPL-MCNC: 0.66 MG/DL — SIGNIFICANT CHANGE UP (ref 0.5–1.3)
EGFR: 84 ML/MIN/1.73M2 — SIGNIFICANT CHANGE UP
EOSINOPHIL # BLD AUTO: 0.31 K/UL — SIGNIFICANT CHANGE UP (ref 0–0.5)
EOSINOPHIL NFR BLD AUTO: 3.5 % — SIGNIFICANT CHANGE UP (ref 0–6)
ESTIMATED AVERAGE GLUCOSE: 171 MG/DL — HIGH (ref 68–114)
GLUCOSE SERPL-MCNC: 160 MG/DL — HIGH (ref 70–99)
HCT VFR BLD CALC: 31.4 % — LOW (ref 34.5–45)
HDLC SERPL-MCNC: 33 MG/DL — LOW
HGB BLD-MCNC: 10.1 G/DL — LOW (ref 11.5–15.5)
IMM GRANULOCYTES NFR BLD AUTO: 0.2 % — SIGNIFICANT CHANGE UP (ref 0–0.9)
INR BLD: 0.98 RATIO — SIGNIFICANT CHANGE UP (ref 0.85–1.18)
LIPID PNL WITH DIRECT LDL SERPL: 35 MG/DL — SIGNIFICANT CHANGE UP
LYMPHOCYTES # BLD AUTO: 1.69 K/UL — SIGNIFICANT CHANGE UP (ref 1–3.3)
LYMPHOCYTES # BLD AUTO: 18.9 % — SIGNIFICANT CHANGE UP (ref 13–44)
MCHC RBC-ENTMCNC: 28.3 PG — SIGNIFICANT CHANGE UP (ref 27–34)
MCHC RBC-ENTMCNC: 32.2 GM/DL — SIGNIFICANT CHANGE UP (ref 32–36)
MCV RBC AUTO: 88 FL — SIGNIFICANT CHANGE UP (ref 80–100)
MONOCYTES # BLD AUTO: 1.07 K/UL — HIGH (ref 0–0.9)
MONOCYTES NFR BLD AUTO: 12 % — SIGNIFICANT CHANGE UP (ref 2–14)
NEUTROPHILS # BLD AUTO: 5.8 K/UL — SIGNIFICANT CHANGE UP (ref 1.8–7.4)
NEUTROPHILS NFR BLD AUTO: 65 % — SIGNIFICANT CHANGE UP (ref 43–77)
NON HDL CHOLESTEROL: 51 MG/DL — SIGNIFICANT CHANGE UP
NRBC # BLD: 0 /100 WBCS — SIGNIFICANT CHANGE UP (ref 0–0)
PLATELET # BLD AUTO: 229 K/UL — SIGNIFICANT CHANGE UP (ref 150–400)
POTASSIUM SERPL-MCNC: 4.4 MMOL/L — SIGNIFICANT CHANGE UP (ref 3.5–5.3)
POTASSIUM SERPL-SCNC: 4.4 MMOL/L — SIGNIFICANT CHANGE UP (ref 3.5–5.3)
PROT SERPL-MCNC: 6.4 G/DL — SIGNIFICANT CHANGE UP (ref 6–8.3)
PROTHROM AB SERPL-ACNC: 11.5 SEC — SIGNIFICANT CHANGE UP (ref 9.5–13)
RBC # BLD: 3.57 M/UL — LOW (ref 3.8–5.2)
RBC # FLD: 13.1 % — SIGNIFICANT CHANGE UP (ref 10.3–14.5)
SODIUM SERPL-SCNC: 141 MMOL/L — SIGNIFICANT CHANGE UP (ref 135–145)
TRIGL SERPL-MCNC: 76 MG/DL — SIGNIFICANT CHANGE UP
WBC # BLD: 8.93 K/UL — SIGNIFICANT CHANGE UP (ref 3.8–10.5)
WBC # FLD AUTO: 8.93 K/UL — SIGNIFICANT CHANGE UP (ref 3.8–10.5)

## 2024-03-05 PROCEDURE — 93970 EXTREMITY STUDY: CPT | Mod: 26

## 2024-03-05 PROCEDURE — 99223 1ST HOSP IP/OBS HIGH 75: CPT

## 2024-03-05 RX ORDER — AMLODIPINE BESYLATE 2.5 MG/1
5 TABLET ORAL DAILY
Refills: 0 | Status: DISCONTINUED | OUTPATIENT
Start: 2024-03-05 | End: 2024-03-07

## 2024-03-05 RX ORDER — LISINOPRIL 2.5 MG/1
40 TABLET ORAL DAILY
Refills: 0 | Status: DISCONTINUED | OUTPATIENT
Start: 2024-03-05 | End: 2024-03-07

## 2024-03-05 RX ORDER — ALLOPURINOL 300 MG
1 TABLET ORAL
Qty: 0 | Refills: 0 | DISCHARGE

## 2024-03-05 RX ORDER — METOPROLOL TARTRATE 50 MG
1 TABLET ORAL
Refills: 0 | DISCHARGE

## 2024-03-05 RX ORDER — DEXTROSE 50 % IN WATER 50 %
12.5 SYRINGE (ML) INTRAVENOUS ONCE
Refills: 0 | Status: DISCONTINUED | OUTPATIENT
Start: 2024-03-05 | End: 2024-03-07

## 2024-03-05 RX ORDER — GLUCAGON INJECTION, SOLUTION 0.5 MG/.1ML
1 INJECTION, SOLUTION SUBCUTANEOUS ONCE
Refills: 0 | Status: DISCONTINUED | OUTPATIENT
Start: 2024-03-05 | End: 2024-03-07

## 2024-03-05 RX ORDER — ALLOPURINOL 300 MG
100 TABLET ORAL DAILY
Refills: 0 | Status: DISCONTINUED | OUTPATIENT
Start: 2024-03-05 | End: 2024-03-07

## 2024-03-05 RX ORDER — ASPIRIN/CALCIUM CARB/MAGNESIUM 324 MG
81 TABLET ORAL DAILY
Refills: 0 | Status: DISCONTINUED | OUTPATIENT
Start: 2024-03-05 | End: 2024-03-07

## 2024-03-05 RX ORDER — ATORVASTATIN CALCIUM 80 MG/1
40 TABLET, FILM COATED ORAL AT BEDTIME
Refills: 0 | Status: DISCONTINUED | OUTPATIENT
Start: 2024-03-05 | End: 2024-03-07

## 2024-03-05 RX ORDER — ONDANSETRON 8 MG/1
4 TABLET, FILM COATED ORAL EVERY 8 HOURS
Refills: 0 | Status: DISCONTINUED | OUTPATIENT
Start: 2024-03-05 | End: 2024-03-07

## 2024-03-05 RX ORDER — ENOXAPARIN SODIUM 100 MG/ML
40 INJECTION SUBCUTANEOUS EVERY 24 HOURS
Refills: 0 | Status: DISCONTINUED | OUTPATIENT
Start: 2024-03-05 | End: 2024-03-07

## 2024-03-05 RX ORDER — SODIUM CHLORIDE 9 MG/ML
1000 INJECTION, SOLUTION INTRAVENOUS
Refills: 0 | Status: DISCONTINUED | OUTPATIENT
Start: 2024-03-05 | End: 2024-03-07

## 2024-03-05 RX ORDER — PREGABALIN 225 MG/1
0 CAPSULE ORAL
Qty: 0 | Refills: 0 | DISCHARGE

## 2024-03-05 RX ORDER — METOPROLOL TARTRATE 50 MG
75 TABLET ORAL DAILY
Refills: 0 | Status: DISCONTINUED | OUTPATIENT
Start: 2024-03-05 | End: 2024-03-07

## 2024-03-05 RX ORDER — DEXTROSE 50 % IN WATER 50 %
25 SYRINGE (ML) INTRAVENOUS ONCE
Refills: 0 | Status: DISCONTINUED | OUTPATIENT
Start: 2024-03-05 | End: 2024-03-07

## 2024-03-05 RX ORDER — INSULIN LISPRO 100/ML
VIAL (ML) SUBCUTANEOUS AT BEDTIME
Refills: 0 | Status: DISCONTINUED | OUTPATIENT
Start: 2024-03-05 | End: 2024-03-06

## 2024-03-05 RX ORDER — DEXTROSE 50 % IN WATER 50 %
15 SYRINGE (ML) INTRAVENOUS ONCE
Refills: 0 | Status: DISCONTINUED | OUTPATIENT
Start: 2024-03-05 | End: 2024-03-07

## 2024-03-05 RX ORDER — ESCITALOPRAM OXALATE 10 MG/1
1 TABLET, FILM COATED ORAL
Qty: 0 | Refills: 0 | DISCHARGE

## 2024-03-05 RX ORDER — ACETAMINOPHEN 500 MG
650 TABLET ORAL EVERY 6 HOURS
Refills: 0 | Status: DISCONTINUED | OUTPATIENT
Start: 2024-03-05 | End: 2024-03-07

## 2024-03-05 RX ORDER — ESCITALOPRAM OXALATE 10 MG/1
10 TABLET, FILM COATED ORAL DAILY
Refills: 0 | Status: DISCONTINUED | OUTPATIENT
Start: 2024-03-05 | End: 2024-03-07

## 2024-03-05 RX ORDER — BUPROPION HYDROCHLORIDE 150 MG/1
1 TABLET, EXTENDED RELEASE ORAL
Qty: 0 | Refills: 0 | DISCHARGE

## 2024-03-05 RX ORDER — ASPIRIN/CALCIUM CARB/MAGNESIUM 324 MG
1 TABLET ORAL
Qty: 0 | Refills: 0 | DISCHARGE

## 2024-03-05 RX ORDER — SITAGLIPTIN AND METFORMIN HYDROCHLORIDE 500; 50 MG/1; MG/1
1 TABLET, FILM COATED ORAL
Qty: 0 | Refills: 0 | DISCHARGE

## 2024-03-05 RX ORDER — CEFTRIAXONE 500 MG/1
1000 INJECTION, POWDER, FOR SOLUTION INTRAMUSCULAR; INTRAVENOUS EVERY 24 HOURS
Refills: 0 | Status: DISCONTINUED | OUTPATIENT
Start: 2024-03-05 | End: 2024-03-07

## 2024-03-05 RX ORDER — BUPROPION HYDROCHLORIDE 150 MG/1
1 TABLET, EXTENDED RELEASE ORAL
Refills: 0 | DISCHARGE

## 2024-03-05 RX ORDER — INSULIN LISPRO 100/ML
VIAL (ML) SUBCUTANEOUS
Refills: 0 | Status: DISCONTINUED | OUTPATIENT
Start: 2024-03-05 | End: 2024-03-06

## 2024-03-05 RX ORDER — ATORVASTATIN CALCIUM 80 MG/1
1 TABLET, FILM COATED ORAL
Qty: 0 | Refills: 0 | DISCHARGE

## 2024-03-05 RX ORDER — PHENAZOPYRIDINE HCL 100 MG
100 TABLET ORAL ONCE
Refills: 0 | Status: COMPLETED | OUTPATIENT
Start: 2024-03-05 | End: 2024-03-05

## 2024-03-05 RX ORDER — PANTOPRAZOLE SODIUM 20 MG/1
40 TABLET, DELAYED RELEASE ORAL
Refills: 0 | Status: DISCONTINUED | OUTPATIENT
Start: 2024-03-05 | End: 2024-03-07

## 2024-03-05 RX ORDER — LANOLIN ALCOHOL/MO/W.PET/CERES
3 CREAM (GRAM) TOPICAL AT BEDTIME
Refills: 0 | Status: DISCONTINUED | OUTPATIENT
Start: 2024-03-05 | End: 2024-03-07

## 2024-03-05 RX ORDER — BUPROPION HYDROCHLORIDE 150 MG/1
300 TABLET, EXTENDED RELEASE ORAL DAILY
Refills: 0 | Status: DISCONTINUED | OUTPATIENT
Start: 2024-03-05 | End: 2024-03-07

## 2024-03-05 RX ADMIN — ATORVASTATIN CALCIUM 40 MILLIGRAM(S): 80 TABLET, FILM COATED ORAL at 22:03

## 2024-03-05 RX ADMIN — Medication 1 TABLET(S): at 13:53

## 2024-03-05 RX ADMIN — ENOXAPARIN SODIUM 40 MILLIGRAM(S): 100 INJECTION SUBCUTANEOUS at 05:27

## 2024-03-05 RX ADMIN — BUPROPION HYDROCHLORIDE 300 MILLIGRAM(S): 150 TABLET, EXTENDED RELEASE ORAL at 13:51

## 2024-03-05 RX ADMIN — Medication 2: at 12:59

## 2024-03-05 RX ADMIN — PANTOPRAZOLE SODIUM 40 MILLIGRAM(S): 20 TABLET, DELAYED RELEASE ORAL at 05:27

## 2024-03-05 RX ADMIN — Medication 1: at 07:43

## 2024-03-05 RX ADMIN — Medication 81 MILLIGRAM(S): at 13:50

## 2024-03-05 RX ADMIN — Medication 2: at 22:03

## 2024-03-05 RX ADMIN — CEFTRIAXONE 100 MILLIGRAM(S): 500 INJECTION, POWDER, FOR SOLUTION INTRAMUSCULAR; INTRAVENOUS at 22:03

## 2024-03-05 RX ADMIN — Medication 100 MILLIGRAM(S): at 13:52

## 2024-03-05 RX ADMIN — Medication 2: at 18:51

## 2024-03-05 RX ADMIN — Medication 100 MILLIGRAM(S): at 23:47

## 2024-03-05 RX ADMIN — ESCITALOPRAM OXALATE 10 MILLIGRAM(S): 10 TABLET, FILM COATED ORAL at 13:51

## 2024-03-05 NOTE — CONSULT NOTE ADULT - SUBJECTIVE AND OBJECTIVE BOX
Date/Time Patient Seen:  		  Referring MD:   Data Reviewed	       Patient is a 89y old  Female who presents with a chief complaint of uti (05 Mar 2024 02:46)      Subjective/HPI   89yF, PMH of DM, gout, CAD (s/p1 stent in oct  2019), htn, anxiety/depression presenting with one day duration of weakness, abdominal pain, pain with urination with N/V. Story as per daughter over the phone. Patient was being taken to multiple doctors appointments, when she realized the patient could no longer stand up because she was so weak, Patient between doctors appointments grew more lethargic and weak throughout the day. During the appoint at the urogyn office, patient began to get chills and shake. Of note patient has had stomach pain, NV, for over 4 months, it has just gotten worse recently today. Patient currently denies CP, SOB, blurry vision, leg swelling. ROS positive for burning and pain with urination,, lower abdominal tenderness.  PAST MEDICAL & SURGICAL HISTORY:  Diabetes    Hypertension, unspecified type    Stented coronary artery    Hyperlipidemia, unspecified hyperlipidemia type    HTN (hypertension)    No significant past surgical history    No significant past surgical history    PAST SURGICAL HISTORY:  No significant past surgical history     No significant past surgical history.     Social History:  · Substance use	No  · Social History (marital status, living situation, occupation, and sexual history)	denies tobacco use  denies alcohol  denies recreational drug use  lives alone at home, with aide that comes 4 hours daily     Tobacco Screening:  · Core Measure Site	Yes  · Has the patient used tobacco in the past 30 days?	No    Risk Assessment:    Present on Admission:  Deep Venous Thrombosis	no  Pulmonary Embolus	no     HIV Screening:  · In accordance with [place] State law, we offer every patient who comes to our ED an HIV test. Would you like to be tested today?	Opt out        Medication list         MEDICATIONS  (STANDING):  allopurinol 100 milliGRAM(s) Oral daily  amLODIPine   Tablet 5 milliGRAM(s) Oral daily  aspirin enteric coated 81 milliGRAM(s) Oral daily  atorvastatin 40 milliGRAM(s) Oral at bedtime  buPROPion XL (24-Hour) . 300 milliGRAM(s) Oral daily  cefTRIAXone   IVPB 1000 milliGRAM(s) IV Intermittent every 24 hours  dextrose 5%. 1000 milliLiter(s) (50 mL/Hr) IV Continuous <Continuous>  dextrose 5%. 1000 milliLiter(s) (100 mL/Hr) IV Continuous <Continuous>  dextrose 50% Injectable 25 Gram(s) IV Push once  dextrose 50% Injectable 25 Gram(s) IV Push once  dextrose 50% Injectable 12.5 Gram(s) IV Push once  enoxaparin Injectable 40 milliGRAM(s) SubCutaneous every 24 hours  escitalopram 10 milliGRAM(s) Oral daily  glucagon  Injectable 1 milliGRAM(s) IntraMuscular once  insulin lispro (ADMELOG) corrective regimen sliding scale   SubCutaneous at bedtime  insulin lispro (ADMELOG) corrective regimen sliding scale   SubCutaneous three times a day before meals  lisinopril 40 milliGRAM(s) Oral daily  metoprolol tartrate 75 milliGRAM(s) Oral daily  multivitamin 1 Tablet(s) Oral daily  pantoprazole    Tablet 40 milliGRAM(s) Oral before breakfast    MEDICATIONS  (PRN):  acetaminophen     Tablet .. 650 milliGRAM(s) Oral every 6 hours PRN Temp greater or equal to 38C (100.4F), Mild Pain (1 - 3)  dextrose Oral Gel 15 Gram(s) Oral once PRN Blood Glucose LESS THAN 70 milliGRAM(s)/deciliter  melatonin 3 milliGRAM(s) Oral at bedtime PRN Insomnia  ondansetron Injectable 4 milliGRAM(s) IV Push every 8 hours PRN Nausea and/or Vomiting         Vitals log        ICU Vital Signs Last 24 Hrs  T(C): 36.4 (05 Mar 2024 05:21), Max: 36.8 (04 Mar 2024 16:51)  T(F): 97.6 (05 Mar 2024 05:21), Max: 98.3 (04 Mar 2024 16:51)  HR: 73 (05 Mar 2024 05:21) (67 - 74)  BP: 101/56 (05 Mar 2024 05:21) (101/56 - 130/60)  BP(mean): --  ABP: --  ABP(mean): --  RR: 18 (05 Mar 2024 05:21) (16 - 18)  SpO2: 97% (05 Mar 2024 05:21) (93% - 98%)    O2 Parameters below as of 05 Mar 2024 05:21  Patient On (Oxygen Delivery Method): room air                 Input and Output:  I&O's Detail      Lab Data                        10.1   8.93  )-----------( 229      ( 05 Mar 2024 04:55 )             31.4     03-05    141  |  111<H>  |  18  ----------------------------<  160<H>  4.4   |  25  |  0.66    Ca    8.1<L>      05 Mar 2024 04:55  Mg     1.0     03-04    TPro  6.4  /  Alb  2.4<L>  /  TBili  0.3  /  DBili  x   /  AST  7<L>  /  ALT  10<L>  /  AlkPhos  47  03-05      CARDIAC MARKERS ( 04 Mar 2024 18:45 )  x     / x     / 48 U/L / x     / 1.7 ng/mL        Review of Systems	      Objective     Physical Examination        Pertinent Lab findings & Imaging      Chirinos:  NO   Adequate UO     I&O's Detail           Discussed with:     Cultures:	        Radiology      ACC: [id number] EXAM:  CT ANGIO CHEST PULM ART WAWIC   ORDERED BY:  RAJNI TIMMONS     ACC: [id number] EXAM:  CT ABDOMEN AND PELVIS IC   ORDERED BY:  RAJNI TIMMONS     PROCEDURE DATE:  03/04/2024          INTERPRETATION:  CLINICAL INFORMATION: Generalized weakness, cough,   nausea, vomiting    COMPARISON: CT abdomen and pelvis 1/15/2024.    CONTRAST/COMPLICATIONS:  IV Contrast: IV contrast documented in unlinked concurrent exam   (accession [id number]), Omnipaque 350 (accession [id number])  90 cc   administered   10 cc discarded  Oral Contrast: NONE  Complications: None reported at time of study completion    PROCEDURE:  CT Angiography of the Chest was performed followed by portal venous phase   imaging of the Abdomen and Pelvis.  Sagittal and coronal reformats were performed as well as 3D (MIP)   reconstructions.    FINDINGS:  CHEST:  LUNGS AND LARGE AIRWAYS: Patent central airways.  Peripheral reticular lung opacities throughout the lungs suggestive of a   fibrotic process and/or pulmonary edema. Correlate clinically.  PLEURA: No pleural effusion.  VESSELS: No pulmonary embolism.  HEART: Heart size is normal. No pericardial effusion.  MEDIASTINUM AND YOAV: No lymphadenopathy.  CHEST WALL AND LOWER NECK: Within normal limits.    ABDOMEN AND PELVIS:  LIVER: Within normal limits.  BILE DUCTS: Normal caliber.  GALLBLADDER: Within normal limits.  SPLEEN: Within normal limits.  PANCREAS: Within normal limits.  ADRENALS: Within normal limits.  KIDNEYS/URETERS: Within normal limits.    BLADDER: Thickened urinary bladder wall with mild inflammatory changes in   the adjacent fat. Question cystitis. Correlate clinically.  REPRODUCTIVE ORGANS: Uterus and adnexa within normal limits.    BOWEL: No bowel obstruction. Appendix is normal.  PERITONEUM: No ascites.  VESSELS: Atherosclerotic changes.  RETROPERITONEUM/LYMPH NODES: No lymphadenopathy.  ABDOMINAL WALL: Within normal limits.  BONES: Degenerative changes. Thoracolumbar orthopedic hardware.    IMPRESSION: Motion limited exam.    No pulmonary embolism.    Peripheral reticular lung opacities throughout the lungs suggestive of a   fibrotic process and/or pulmonary edema. Correlate clinically.    Thickened urinary bladder wall with mild inflammatory changes in the   adjacent fat. Question cystitis. Correlate clinically.      --- End of Report ---            BRADLEY MAYS MD; Attending Radiologist  This document has been electronically signed. Mar  4 2024  7:29PM                         Date/Time Patient Seen:  		  Referring MD:   Data Reviewed	       Patient is a 89y old  Female who presents with a chief complaint of uti (05 Mar 2024 02:46)      Subjective/HPI   89yF, PMH of DM, gout, CAD (s/p1 stent in oct  2019), htn, anxiety/depression presenting with one day duration of weakness, abdominal pain, pain with urination with N/V. Story as per daughter over the phone. Patient was being taken to multiple doctors appointments, when she realized the patient could no longer stand up because she was so weak, Patient between doctors appointments grew more lethargic and weak throughout the day. During the appoint at the urogyn office, patient began to get chills and shake. Of note patient has had stomach pain, NV, for over 4 months, it has just gotten worse recently today. Patient currently denies CP, SOB, blurry vision, leg swelling. ROS positive for burning and pain with urination,, lower abdominal tenderness.  PAST MEDICAL & SURGICAL HISTORY:  Diabetes    Hypertension, unspecified type    Stented coronary artery    Hyperlipidemia, unspecified hyperlipidemia type    HTN (hypertension)    No significant past surgical history    No significant past surgical history    PAST SURGICAL HISTORY:  No significant past surgical history     No significant past surgical history.     Social History:  · Substance use	No  · Social History (marital status, living situation, occupation, and sexual history)	denies tobacco use  denies alcohol  denies recreational drug use  lives alone at home, with aide that comes 4 hours daily     Tobacco Screening:  · Core Measure Site	Yes  · Has the patient used tobacco in the past 30 days?	No    Risk Assessment:    Present on Admission:  Deep Venous Thrombosis	no  Pulmonary Embolus	no     HIV Screening:  · In accordance with [place] law, we offer every patient who comes to our ED an HIV test. Would you like to be tested today?	Opt out        Medication list         MEDICATIONS  (STANDING):  allopurinol 100 milliGRAM(s) Oral daily  amLODIPine   Tablet 5 milliGRAM(s) Oral daily  aspirin enteric coated 81 milliGRAM(s) Oral daily  atorvastatin 40 milliGRAM(s) Oral at bedtime  buPROPion XL (24-Hour) . 300 milliGRAM(s) Oral daily  cefTRIAXone   IVPB 1000 milliGRAM(s) IV Intermittent every 24 hours  dextrose 5%. 1000 milliLiter(s) (50 mL/Hr) IV Continuous <Continuous>  dextrose 5%. 1000 milliLiter(s) (100 mL/Hr) IV Continuous <Continuous>  dextrose 50% Injectable 25 Gram(s) IV Push once  dextrose 50% Injectable 25 Gram(s) IV Push once  dextrose 50% Injectable 12.5 Gram(s) IV Push once  enoxaparin Injectable 40 milliGRAM(s) SubCutaneous every 24 hours  escitalopram 10 milliGRAM(s) Oral daily  glucagon  Injectable 1 milliGRAM(s) IntraMuscular once  insulin lispro (ADMELOG) corrective regimen sliding scale   SubCutaneous at bedtime  insulin lispro (ADMELOG) corrective regimen sliding scale   SubCutaneous three times a day before meals  lisinopril 40 milliGRAM(s) Oral daily  metoprolol tartrate 75 milliGRAM(s) Oral daily  multivitamin 1 Tablet(s) Oral daily  pantoprazole    Tablet 40 milliGRAM(s) Oral before breakfast    MEDICATIONS  (PRN):  acetaminophen     Tablet .. 650 milliGRAM(s) Oral every 6 hours PRN Temp greater or equal to 38C (100.4F), Mild Pain (1 - 3)  dextrose Oral Gel 15 Gram(s) Oral once PRN Blood Glucose LESS THAN 70 milliGRAM(s)/deciliter  melatonin 3 milliGRAM(s) Oral at bedtime PRN Insomnia  ondansetron Injectable 4 milliGRAM(s) IV Push every 8 hours PRN Nausea and/or Vomiting         Vitals log        ICU Vital Signs Last 24 Hrs  T(C): 36.4 (05 Mar 2024 05:21), Max: 36.8 (04 Mar 2024 16:51)  T(F): 97.6 (05 Mar 2024 05:21), Max: 98.3 (04 Mar 2024 16:51)  HR: 73 (05 Mar 2024 05:21) (67 - 74)  BP: 101/56 (05 Mar 2024 05:21) (101/56 - 130/60)  BP(mean): --  ABP: --  ABP(mean): --  RR: 18 (05 Mar 2024 05:21) (16 - 18)  SpO2: 97% (05 Mar 2024 05:21) (93% - 98%)    O2 Parameters below as of 05 Mar 2024 05:21  Patient On (Oxygen Delivery Method): room air                 Input and Output:  I&O's Detail      Lab Data                        10.1   8.93  )-----------( 229      ( 05 Mar 2024 04:55 )             31.4     03-05    141  |  111<H>  |  18  ----------------------------<  160<H>  4.4   |  25  |  0.66    Ca    8.1<L>      05 Mar 2024 04:55  Mg     1.0     03-04    TPro  6.4  /  Alb  2.4<L>  /  TBili  0.3  /  DBili  x   /  AST  7<L>  /  ALT  10<L>  /  AlkPhos  47  03-05      CARDIAC MARKERS ( 04 Mar 2024 18:45 )  x     / x     / 48 U/L / x     / 1.7 ng/mL        Review of Systems	  weakness  abd pain    Objective     Physical Examination    heart s1s2  lung dec BS  head nc  verbal  cn grossly int      Pertinent Lab findings & Imaging      Chirinos:  NO   Adequate UO     I&O's Detail           Discussed with:     Cultures:	        Radiology      ACC: [id number] EXAM:  CT ANGIO CHEST PULM ART WAWIC   ORDERED BY:  RAJNI TIMMONS     ACC: [id number] EXAM:  CT ABDOMEN AND PELVIS IC   ORDERED BY:  RAJNI TIMMONS     PROCEDURE DATE:  03/04/2024          INTERPRETATION:  CLINICAL INFORMATION: Generalized weakness, cough,   nausea, vomiting    COMPARISON: CT abdomen and pelvis 1/15/2024.    CONTRAST/COMPLICATIONS:  IV Contrast: IV contrast documented in unlinked concurrent exam   (accession [id number]), Omnipaque 350 (accession [id number])  90 cc   administered   10 cc discarded  Oral Contrast: NONE  Complications: None reported at time of study completion    PROCEDURE:  CT Angiography of the Chest was performed followed by portal venous phase   imaging of the Abdomen and Pelvis.  Sagittal and coronal reformats were performed as well as 3D (MIP)   reconstructions.    FINDINGS:  CHEST:  LUNGS AND LARGE AIRWAYS: Patent central airways.  Peripheral reticular lung opacities throughout the lungs suggestive of a   fibrotic process and/or pulmonary edema. Correlate clinically.  PLEURA: No pleural effusion.  VESSELS: No pulmonary embolism.  HEART: Heart size is normal. No pericardial effusion.  MEDIASTINUM AND YOAV: No lymphadenopathy.  CHEST WALL AND LOWER NECK: Within normal limits.    ABDOMEN AND PELVIS:  LIVER: Within normal limits.  BILE DUCTS: Normal caliber.  GALLBLADDER: Within normal limits.  SPLEEN: Within normal limits.  PANCREAS: Within normal limits.  ADRENALS: Within normal limits.  KIDNEYS/URETERS: Within normal limits.    BLADDER: Thickened urinary bladder wall with mild inflammatory changes in   the adjacent fat. Question cystitis. Correlate clinically.  REPRODUCTIVE ORGANS: Uterus and adnexa within normal limits.    BOWEL: No bowel obstruction. Appendix is normal.  PERITONEUM: No ascites.  VESSELS: Atherosclerotic changes.  RETROPERITONEUM/LYMPH NODES: No lymphadenopathy.  ABDOMINAL WALL: Within normal limits.  BONES: Degenerative changes. Thoracolumbar orthopedic hardware.    IMPRESSION: Motion limited exam.    No pulmonary embolism.    Peripheral reticular lung opacities throughout the lungs suggestive of a   fibrotic process and/or pulmonary edema. Correlate clinically.    Thickened urinary bladder wall with mild inflammatory changes in the   adjacent fat. Question cystitis. Correlate clinically.      --- End of Report ---            BRADLEY MAYS MD; Attending Radiologist  This document has been electronically signed. Mar  4 2024  7:29PM

## 2024-03-05 NOTE — CARE COORDINATION ASSESSMENT. - NSPASTMEDSURGHISTORY_GEN_ALL_CORE_FT
PAST MEDICAL & SURGICAL HISTORY:  Hyperlipidemia, unspecified hyperlipidemia type      Stented coronary artery      Hypertension, unspecified type      Diabetes      No significant past surgical history      HTN (hypertension)      No significant past surgical history

## 2024-03-05 NOTE — ED ADULT NURSE REASSESSMENT NOTE - NSFALLRISKINTERV_ED_ALL_ED

## 2024-03-05 NOTE — H&P ADULT - PROBLEM SELECTOR PLAN 7
lovenox 40mg QD for dvt ppx  PT consult chronic  continue home medications  - lexapro 10mg QD  - buproprion 300mg QD

## 2024-03-05 NOTE — H&P ADULT - PROBLEM SELECTOR PLAN 5
chronic  continue home medications  - allopurinol 100 mg QD on [employer]  BID  will hold oral agents  low dose ISS, no basal or premeal insulin  a1c in AM  hypoglycemia protocol  acu checks per protocol

## 2024-03-05 NOTE — H&P ADULT - NSHPSOCIALHISTORY_GEN_ALL_CORE
denies tobacco use  denies alcohol  denies recreational drug use  lives alone at home, with aide that comes 4 hours daily

## 2024-03-05 NOTE — H&P ADULT - NSHPREVIEWOFSYSTEMS_GEN_ALL_CORE
REVIEW OF SYSTEMS:    CONSTITUTIONAL:  + weakness and chills  EYES/ENT:  No visual changes;  No vertigo or throat pain   NECK:  No pain or stiffness  RESPIRATORY:  No cough, wheezing, hemoptysis; No shortness of breath  CARDIOVASCULAR:  No chest pain or palpitations  GASTROINTESTINAL:  No abdominal or epigastric pain. No nausea, vomiting, or hematemesis; No diarrhea or constipation. No melena or hematochezia.  GENITOURINARY:  + dysuria  NEUROLOGICAL:  + weakness  SKIN:  No itching, rashes

## 2024-03-05 NOTE — ED ADULT NURSE REASSESSMENT NOTE - NS ED NURSE REASSESS COMMENT FT1
0130: spoke to SW. pt pending ambulette transport [facility]. pt alert, confused at moments, repeated understanding of transport/location. pt provided lunch tray & pt appreciative, verbally denies any new/worsening sx.
0745: report received from previous shift RN. pt a&o x3, laying in stretcher, verbally denies any new/worsening complaints. pt at 92% O2 on RA, placed pt on NC 2L & now 97%. pt respirations even/unlabored, no acute distress noted. breakfast tray provided post-insulin, comfort/safety maintained. will continue to monitor... pt pending tele bed placement

## 2024-03-05 NOTE — H&P ADULT - HISTORY OF PRESENT ILLNESS
89yF, PMH of DM, gout, CAD (s/p1 stent in oct  2019), htn, anxiety/depression presenting with one day duration of weakness, abdominal pain, pain with urination with N/V. Story as per daughter over the phone. Patient was being taken to multiple doctors appointments, when she realized the patient could no longer stand up because she was so weak, Patient between doctors appointments grew more lethargic and weak throughout the day. During the appoint at the urogyn office, patient began to get chills and shake. Of note patient has had stomach pain, NV, for over 4 months, it has just gotten worse recently today. Patient currently denies CP, SOB, blurry vision, leg swelling. ROS positive for burning and pain with urination,, lower abdominal tenderness.    In the ED pts VS wereT(F): 97.7 HR: 67 BP: 106/61 RR: 18 SpO2: 93%  Labs show: H.6   WBC:8.96  Plt:270  Creatinine: .87 Lactate 2.5 ->.8 mg 1.0 UA: protein 300, large leuks, blood moderate, wbc, bacteria    CTchest a/p:   No pulmonary embolism.    Peripheral reticular lung opacities throughout the lungs suggestive of a   fibrotic process and/or pulmonary edema. Correlate clinically.    Thickened urinary bladder wall with mild inflammatory changes in the   adjacent fat. Question cystitis. Correlate clinically.       S/p: 2g mag, zofran 4mg, rocephin 1g, IV tylenol 1g, famotidie 20mg, 1L NS    Pt is admitted for further workup and management

## 2024-03-05 NOTE — CARE COORDINATION ASSESSMENT. - NSCAREPROVIDERS_GEN_ALL_CORE_FT
CARE PROVIDERS:  Accepting Physician: Ponce Oreilly  Administration: Elroy Singh  Administration: Luis Hughes  Administration: Pavel Kraus  Admitting: Ponce Oreilly  Attending: Ponce Oreilly  Cardiology Technician: Merced Vidal  Consultant: Isiah Mora  Covering Team: Rebecca Ornelas  ED Attending: Rome Narayan  ED Nurse: Jah Cabello  Emergency Medicine: Rome Narayan  Infection Control: Zink, Corinne  Ordered: Doctor, Unknown  Ordered: ADM, User  PCA/Nursing Assistant: Sylvia Cramer  PCA/Nursing Assistant: Kimberly Black  Physical Therapy: Hugo Ward  Primary Team: Cody Cook  Primary Team: Pablo Carrasco  Primary Team: Ponce Oreilly  Respiratory Therapy: Alexx Falcon  : Oliva Freed  Team: [facility]  Hospitalists, Team  UR// Supp. Assoc.: Megan Christian  UR// Supp. Assoc.: Ramin Edwards

## 2024-03-05 NOTE — H&P ADULT - PROBLEM SELECTOR PLAN 1
UA: + for wbc, bacteria  CT A/P:Thickened urinary bladder wall with mild inflammatory changes in the adjacent fat. Question cystitis.  s/p rocephin 1g in ED, will continue rocephin 1g q24  f/u urine cx, blood cx  lactate 2.8 -> .8 s/p 1L bolus, continue IVF, LR at 50ml/hr  NV from infection vs diabetic gastroparesis, zofran 4mg q8; qtc 497

## 2024-03-05 NOTE — H&P ADULT - NSHPPHYSICALEXAM_GEN_ALL_CORE
VITALS:   T(C): 36.5 (03-04-24 @ 22:54), Max: 36.8 (03-04-24 @ 16:51)  HR: 67 (03-04-24 @ 22:54) (67 - 74)  BP: 106/61 (03-04-24 @ 22:54) (106/61 - 130/60)  RR: 18 (03-04-24 @ 22:54) (16 - 18)  SpO2: 93% (03-04-24 @ 22:54) (93% - 98%)    GENERAL: NAD, lying in bed comfortably  HEAD:  Atraumatic  EYES: conjunctiva and sclera clear  ENT: Moist mucous membranes  NECK: no JVD  HEART: Regular rate and rhythm, no murmurs, rubs, or gallops  LUNGS: Unlabored respirations.  Clear to auscultation bilaterally, no crackles, wheezing, or rhonchi  ABDOMEN: Soft, nondistended, tender to palpation in lower quadrants  : Suprapubic tenderness, no CVA tenderness  EXTREMITIES: 2+ peripheral pulses bilaterally. No clubbing, cyanosis, or edema  NERVOUS SYSTEM:  A&Ox3, no focal deficits

## 2024-03-05 NOTE — CHART NOTE - NSCHARTNOTEFT_GEN_A_CORE
Patient seen and examined  H and P  reviewed  Agree with H and P with following addenda      Patient comfortable seen with 02?  denies any pain discomfort  pleasantly confused  Venous  dopplers pending  UA noted + UTI  on rocephin (blood and urine are collected)    overall plan:  await echo :pitting edema trace  hold diuretics  maintain 02 > 88  followup final cultures    Note : with pulm fibrosis :patient may need home 02

## 2024-03-05 NOTE — CONSULT NOTE ADULT - ASSESSMENT
89yF, PMH of DM, gout, CAD (s/p1 stent in oct  2019), htn, anxiety/depression presenting with one day duration of weakness, abdominal pain, pain with urination with N/V. 89yF, PMH of DM, gout, CAD (s/p1 stent in oct  2019), htn, anxiety/depression presenting with one day duration of weakness, abdominal pain, pain with urination with N/V.    eval pulm edema  eval ILD  GOUT  DM  OP  OA  CAD  HTN  Anxiety  Abd pain    cardio eval  cvs rx regimen  TTE  I and O  BP control  CT chest neg for pe - reticulation - eval for ILD  PFT as outpatient  eval for Pulm Edema  proBNP noted  monitor VS and HD and Sat  goal sat > 88 pct  GOC discussion  old records reviewed  no acute emerging pathology on CT chest

## 2024-03-05 NOTE — CARE COORDINATION ASSESSMENT. - OTHER PERTINENT REFERRAL INFORMATION
88 y/o female seen by SW in ED with PMHx of DM, gout, CAD, h/o anxiety. Pt came into ED with weakness and chills, awaiting further medical workup. Pt is alert and oriented and able to make needs known, states she lives alone but has private hire HHA 4 hrs/7 days and also has support from daughter who is very involved with care. Pt has cane/walker/w/c at home, SW will remain available for follow up to ensure safe transitional planning

## 2024-03-05 NOTE — PATIENT PROFILE ADULT - FALL HARM RISK - RISK INTERVENTIONS
Assistance OOB with selected safe patient handling equipment/Assistance with ambulation/Communicate Fall Risk and Risk Factors to all staff, patient, and family/Discuss with provider need for PT consult/Monitor gait and stability/Reinforce activity limits and safety measures with patient and family/Visual Cue: Yellow wristband/Bed in lowest position, wheels locked, appropriate side rails in place/Call bell, personal items and telephone in reach/Instruct patient to call for assistance before getting out of bed or chair/Non-slip footwear when patient is out of bed/[place] to call system/Physically safe environment - no spills, clutter or unnecessary equipment/Purposeful Proactive Rounding/Room/bathroom lighting operational, light cord in reach

## 2024-03-05 NOTE — H&P ADULT - PROBLEM SELECTOR PLAN 3
chronic; BP on admission: 106/61  continue home medications  - lisinopril 25mg QD  - amlodipine 5mg QD chronic  s/p stent  2019  continue home medications  - aspirin 81mg QD  - Metoprolol tartrate 75mg QD  - atorvastatin 40mg QD  Lipid panel in AM

## 2024-03-05 NOTE — H&P ADULT - ATTENDING COMMENTS
89yF, PMH of DM, gout, CAD (s/p1 stent in oct  2019), HTN, anxiety/depression p/w weakness, chills, dysuria for 1 week progressively worsening. Pt with weakness with difficulty ambulation as of late. vitals wnl, generally appears frail, obese, abd soft, mild suprapubic tenderness, non-distended, sensation and strength intact in ext. Labs with mild lactate normalized after fluids, hypomagnesemia, RVP negative, UA with bacteria, wbcs, leuks, and neg nitrites. Imaging with bladder wall thickening. Admitted for ambulatory dysfunction 2/2 UTI. Send septic w/u, started ceftriaxone, f/u uclx to descalate abx therapy. PT for ambulatory dysfunction, multivitamins, fall precautions. CAD/HTN - c/w home meds. DM - ISF coverage, increase as needed for glycemic control. Gout/Anxiety and depression - c/w home meds. DVT ppx. DASH diet. 89yF, PMH of DM, gout, CAD (s/p1 stent in oct  2019), HTN, anxiety/depression p/w weakness, chills, dysuria for 1 week progressively worsening. Pt with weakness with difficulty ambulation as of late. vitals wnl, generally appears frail, obese, abd soft, mild suprapubic tenderness, non-distended, sensation and strength intact in ext. Labs with mild lactate normalized after fluids, hypomagnesemia, RVP negative, UA with bacteria, wbcs, leuks, and neg nitrites. Imaging with bladder wall thickening. Admitted for ambulatory dysfunction 2/2 UTI. Send septic w/u, started ceftriaxone, f/u uclx to descalate abx therapy. PT for ambulatory dysfunction, multivitamins, fall precautions. Pulm fibrosis - BNP mild elevation likely 2/2 pulm htn with cor pulmonale, which is known and chronic, can follow up with cardio and pulmonology as outpatient. CAD/HTN - c/w home meds. DM - ISF coverage, increase as needed for glycemic control. Gout/Anxiety and depression - c/w home meds. DVT ppx. DASH diet. 89yF, PMH of DM, gout, CAD (s/p1 stent in oct  2019), HTN, anxiety/depression p/w weakness, chills, dysuria for 1 week progressively worsening. Pt with weakness with difficulty ambulation as of late. vitals wnl, generally appears frail, obese, abd soft, mild suprapubic tenderness, non-distended, sensation and strength intact in ext. Labs with mild lactate normalized after fluids, hypomagnesemia, RVP negative, UA with bacteria, wbcs, leuks, and neg nitrites. Imaging with bladder wall thickening. Admitted for ambulatory dysfunction 2/2 UTI. Send septic w/u, started ceftriaxone, f/u uclx to descalate abx therapy. PT for ambulatory dysfunction, multivitamins, fall precautions. Pulm fibrosis - Seen on CT chest, BNP mild elevation likely 2/2 pulm htn with cor pulmonale, likely confounding factor in her symptoms, no prior ECHO on file, and pt has not seen pulm or cardiology recently for this, will get ECHO for further eval, and pulm eval. CAD/HTN - c/w home meds. DM - ISF coverage, increase as needed for glycemic control. Gout/Anxiety and depression - c/w home meds. DVT ppx. DASH diet. 89yF, PMH of DM, gout, CAD (s/p1 stent in oct  2019), HTN, anxiety/depression p/w weakness, chills, dysuria for 1 week progressively worsening. Pt with weakness with difficulty ambulation as of late. vitals wnl, generally appears frail, obese, abd soft, mild suprapubic tenderness, non-distended, sensation and strength intact in ext. Labs with mild lactate normalized after fluids, hypomagnesemia, RVP negative, UA with bacteria, wbcs, leuks, and neg nitrites. Imaging with bladder wall thickening. Admitted for ambulatory dysfunction 2/2 UTI. Send septic w/u, started ceftriaxone, f/u uclx to descalate abx therapy. PT for ambulatory dysfunction, US dopplers to r/o DVT, multivitamins, fall precautions. Pulm fibrosis - Seen on CT chest, BNP mild elevation likely 2/2 pulm htn with cor pulmonale, likely confounding factor in her symptoms, no prior ECHO on file, and pt has not seen pulm or cardiology recently for this, will get ECHO for further eval, and pulm eval. CAD/HTN - c/w home meds. DM - ISF coverage, increase as needed for glycemic control. Gout/Anxiety and depression - c/w home meds. DVT ppx. DASH diet.

## 2024-03-05 NOTE — H&P ADULT - PROBLEM SELECTOR PLAN 2
chronic  s/p stent  2019  continue home medications  - aspirin 81mg QD  - Metoprolol tartrate 75mg QD  - atorvastatin 40mg QD  Lipid panel in AM CT: Peripheral reticular lung opacities throughout the lungs suggestive of a fibrotic process and/or pulmonary edema.  BNP: 774  No elevated liver enzymes, low suspicion for cor-pulmonale  TTE for AM  pulm consulted: sal

## 2024-03-05 NOTE — H&P ADULT - PROBLEM SELECTOR PLAN 4
on Home janumet  BID  will hold oral agents  low dose ISS, no basal or premeal insulin  a1c in AM  hypoglycemia protocol  acu checks per protocol chronic; BP on admission: 106/61  continue home medications  - lisinopril 25mg QD  - amlodipine 5mg QD

## 2024-03-05 NOTE — CARE COORDINATION ASSESSMENT. - NSDCPLANSERVICES_GEN_ALL_CORE
Anticipated Needs Unclear at Present Low Dose Naltrexone Pregnancy And Lactation Text: Naltrexone is pregnancy category C.  There have been no adequate and well-controlled studies in pregnant women.  It should be used in pregnancy only if the potential benefit justifies the potential risk to the fetus.   Limited data indicates that naltrexone is minimally excreted into breastmilk.

## 2024-03-05 NOTE — H&P ADULT - ASSESSMENT
89yF, PMH of DM, gout, CAD (s/p1 stent in oct  2019), htn, anxiety/depression admitted for weakness likely 2/2 to UTI.

## 2024-03-06 ENCOUNTER — RESULT REVIEW (OUTPATIENT)
Age: 89
End: 2024-03-06

## 2024-03-06 ENCOUNTER — TRANSCRIPTION ENCOUNTER (OUTPATIENT)
Age: 89
End: 2024-03-06

## 2024-03-06 LAB
A1C WITH ESTIMATED AVERAGE GLUCOSE RESULT: 7.7 % — HIGH (ref 4–5.6)
ALBUMIN SERPL ELPH-MCNC: 2.7 G/DL — LOW (ref 3.3–5)
ALP SERPL-CCNC: 60 U/L — SIGNIFICANT CHANGE UP (ref 40–120)
ALT FLD-CCNC: 11 U/L — LOW (ref 12–78)
ANION GAP SERPL CALC-SCNC: 6 MMOL/L — SIGNIFICANT CHANGE UP (ref 5–17)
AST SERPL-CCNC: 8 U/L — LOW (ref 15–37)
BASOPHILS # BLD AUTO: 0.06 K/UL — SIGNIFICANT CHANGE UP (ref 0–0.2)
BASOPHILS NFR BLD AUTO: 0.6 % — SIGNIFICANT CHANGE UP (ref 0–2)
BILIRUB SERPL-MCNC: 0.2 MG/DL — SIGNIFICANT CHANGE UP (ref 0.2–1.2)
BUN SERPL-MCNC: 11 MG/DL — SIGNIFICANT CHANGE UP (ref 7–23)
CALCIUM SERPL-MCNC: 8.4 MG/DL — LOW (ref 8.5–10.1)
CHLORIDE SERPL-SCNC: 105 MMOL/L — SIGNIFICANT CHANGE UP (ref 96–108)
CO2 SERPL-SCNC: 27 MMOL/L — SIGNIFICANT CHANGE UP (ref 22–31)
CREAT SERPL-MCNC: 0.65 MG/DL — SIGNIFICANT CHANGE UP (ref 0.5–1.3)
CULTURE RESULTS: SIGNIFICANT CHANGE UP
EGFR: 84 ML/MIN/1.73M2 — SIGNIFICANT CHANGE UP
EOSINOPHIL # BLD AUTO: 0.36 K/UL — SIGNIFICANT CHANGE UP (ref 0–0.5)
EOSINOPHIL NFR BLD AUTO: 3.6 % — SIGNIFICANT CHANGE UP (ref 0–6)
ESTIMATED AVERAGE GLUCOSE: 174 MG/DL — HIGH (ref 68–114)
GLUCOSE SERPL-MCNC: 252 MG/DL — HIGH (ref 70–99)
HCT VFR BLD CALC: 34.9 % — SIGNIFICANT CHANGE UP (ref 34.5–45)
HGB BLD-MCNC: 11.1 G/DL — LOW (ref 11.5–15.5)
IMM GRANULOCYTES NFR BLD AUTO: 0.4 % — SIGNIFICANT CHANGE UP (ref 0–0.9)
LYMPHOCYTES # BLD AUTO: 1.39 K/UL — SIGNIFICANT CHANGE UP (ref 1–3.3)
LYMPHOCYTES # BLD AUTO: 14 % — SIGNIFICANT CHANGE UP (ref 13–44)
MAGNESIUM SERPL-MCNC: 1.7 MG/DL — SIGNIFICANT CHANGE UP (ref 1.6–2.6)
MCHC RBC-ENTMCNC: 27.6 PG — SIGNIFICANT CHANGE UP (ref 27–34)
MCHC RBC-ENTMCNC: 31.8 GM/DL — LOW (ref 32–36)
MCV RBC AUTO: 86.8 FL — SIGNIFICANT CHANGE UP (ref 80–100)
MONOCYTES # BLD AUTO: 0.79 K/UL — SIGNIFICANT CHANGE UP (ref 0–0.9)
MONOCYTES NFR BLD AUTO: 7.9 % — SIGNIFICANT CHANGE UP (ref 2–14)
NEUTROPHILS # BLD AUTO: 7.31 K/UL — SIGNIFICANT CHANGE UP (ref 1.8–7.4)
NEUTROPHILS NFR BLD AUTO: 73.5 % — SIGNIFICANT CHANGE UP (ref 43–77)
NRBC # BLD: 0 /100 WBCS — SIGNIFICANT CHANGE UP (ref 0–0)
PHOSPHATE SERPL-MCNC: 2 MG/DL — LOW (ref 2.5–4.5)
PLATELET # BLD AUTO: 278 K/UL — SIGNIFICANT CHANGE UP (ref 150–400)
POTASSIUM SERPL-MCNC: 4.4 MMOL/L — SIGNIFICANT CHANGE UP (ref 3.5–5.3)
POTASSIUM SERPL-SCNC: 4.4 MMOL/L — SIGNIFICANT CHANGE UP (ref 3.5–5.3)
PROT SERPL-MCNC: 7.1 G/DL — SIGNIFICANT CHANGE UP (ref 6–8.3)
RBC # BLD: 4.02 M/UL — SIGNIFICANT CHANGE UP (ref 3.8–5.2)
RBC # FLD: 13 % — SIGNIFICANT CHANGE UP (ref 10.3–14.5)
SODIUM SERPL-SCNC: 138 MMOL/L — SIGNIFICANT CHANGE UP (ref 135–145)
SPECIMEN SOURCE: SIGNIFICANT CHANGE UP
WBC # BLD: 9.95 K/UL — SIGNIFICANT CHANGE UP (ref 3.8–10.5)
WBC # FLD AUTO: 9.95 K/UL — SIGNIFICANT CHANGE UP (ref 3.8–10.5)

## 2024-03-06 PROCEDURE — 93306 TTE W/DOPPLER COMPLETE: CPT | Mod: 26

## 2024-03-06 PROCEDURE — 99232 SBSQ HOSP IP/OBS MODERATE 35: CPT | Mod: GC

## 2024-03-06 RX ORDER — INSULIN LISPRO 100/ML
VIAL (ML) SUBCUTANEOUS AT BEDTIME
Refills: 0 | Status: DISCONTINUED | OUTPATIENT
Start: 2024-03-06 | End: 2024-03-07

## 2024-03-06 RX ORDER — SODIUM,POTASSIUM PHOSPHATES 278-250MG
1 POWDER IN PACKET (EA) ORAL ONCE
Refills: 0 | Status: COMPLETED | OUTPATIENT
Start: 2024-03-06 | End: 2024-03-06

## 2024-03-06 RX ORDER — INSULIN LISPRO 100/ML
VIAL (ML) SUBCUTANEOUS
Refills: 0 | Status: DISCONTINUED | OUTPATIENT
Start: 2024-03-06 | End: 2024-03-07

## 2024-03-06 RX ORDER — PHENAZOPYRIDINE HCL 100 MG
200 TABLET ORAL EVERY 8 HOURS
Refills: 0 | Status: DISCONTINUED | OUTPATIENT
Start: 2024-03-06 | End: 2024-03-07

## 2024-03-06 RX ADMIN — ATORVASTATIN CALCIUM 40 MILLIGRAM(S): 80 TABLET, FILM COATED ORAL at 21:33

## 2024-03-06 RX ADMIN — LISINOPRIL 40 MILLIGRAM(S): 2.5 TABLET ORAL at 05:57

## 2024-03-06 RX ADMIN — Medication 200 MILLIGRAM(S): at 21:34

## 2024-03-06 RX ADMIN — Medication 2: at 17:34

## 2024-03-06 RX ADMIN — Medication 1 PACKET(S): at 12:31

## 2024-03-06 RX ADMIN — Medication 81 MILLIGRAM(S): at 11:43

## 2024-03-06 RX ADMIN — ESCITALOPRAM OXALATE 10 MILLIGRAM(S): 10 TABLET, FILM COATED ORAL at 11:42

## 2024-03-06 RX ADMIN — PANTOPRAZOLE SODIUM 40 MILLIGRAM(S): 20 TABLET, DELAYED RELEASE ORAL at 05:58

## 2024-03-06 RX ADMIN — Medication 6: at 12:31

## 2024-03-06 RX ADMIN — Medication 1 TABLET(S): at 11:42

## 2024-03-06 RX ADMIN — BUPROPION HYDROCHLORIDE 300 MILLIGRAM(S): 150 TABLET, EXTENDED RELEASE ORAL at 11:43

## 2024-03-06 RX ADMIN — AMLODIPINE BESYLATE 5 MILLIGRAM(S): 2.5 TABLET ORAL at 05:57

## 2024-03-06 RX ADMIN — Medication 200 MILLIGRAM(S): at 12:31

## 2024-03-06 RX ADMIN — Medication 2: at 08:23

## 2024-03-06 RX ADMIN — Medication 100 MILLIGRAM(S): at 11:43

## 2024-03-06 RX ADMIN — CEFTRIAXONE 100 MILLIGRAM(S): 500 INJECTION, POWDER, FOR SOLUTION INTRAMUSCULAR; INTRAVENOUS at 22:33

## 2024-03-06 RX ADMIN — ENOXAPARIN SODIUM 40 MILLIGRAM(S): 100 INJECTION SUBCUTANEOUS at 05:57

## 2024-03-06 RX ADMIN — Medication 75 MILLIGRAM(S): at 05:57

## 2024-03-06 NOTE — DISCHARGE NOTE PROVIDER - CARE PROVIDER_API CALL
Joseluis Salcedo  Internal Medicine  [address] [address]  Phone: [phone]  Fax: [phone]  Follow Up Time:    Joseluis Salcedo  Internal Medicine  [address] [address]  Phone: [phone]  Fax: [phone]  Follow Up Time:     Aracelis Krishnamurthy  Pulmonary Disease  [address] [facility], [facility] [address]  [place] [address]  Phone: [phone]  Fax: [phone]  Follow Up Time:

## 2024-03-06 NOTE — DISCHARGE NOTE PROVIDER - CARE PROVIDERS DIRECT ADDRESSES
[email or web address]

## 2024-03-06 NOTE — PHYSICAL THERAPY INITIAL EVALUATION ADULT - WEIGHT-BEARING RESTRICTIONS: STAND/SIT, REHAB EVAL
Discharge instructions given. Education provided. All questions answered and verbally voiced understanding. Medication changes and follow up appointments discussed. Script provided. AVS reviewed & e-signed. Copy provided for pt. Pt    at bedside to transport pt home. full weight-bearing Bilobed Flap Text: The defect edges were debeveled with a #15 scalpel blade. Given the location of the defect and the proximity to free margins a bilobe flap was deemed most appropriate. Using a sterile surgical marker, an appropriate bilobe flap drawn around the defect. The area thus outlined was incised deep to adipose tissue with a #15 scalpel blade. The skin margins were undermined to an appropriate distance in all directions utilizing iris scissors. Following this, the designed flap was carried over into the primary defect and sutured into place.

## 2024-03-06 NOTE — DISCHARGE NOTE PROVIDER - NSDCHC_MEDRECSTATUS_GEN_ALL_CORE
SLEEP APNEA [adult]   Sleep Apnea (also called “Obstructive Sleep Apnea”) is a condition where there are long pauses between breaths during sleep. This usually occurs when the tissues and muscles in the back of the throat relax too much during sleep. This causes the air passage in your throat to narrow or block off completely. Breathing slows down or stops completely and you then wake up, take a few good breaths and fall back to sleep again. There may be 10-60 such awakenings during a night. This prevents you from getting to the deeper stages of sleep that are needed for the body to rest and recover its strength.   During sleep, loud snoring, noisy breathing or gasping sounds are common. The sleep disturbance causes daytime symptoms such as difficulty getting up in the morning, need for daytime naps, irritability, poor concentration and attention.   CAUSES of SLEEP APNEA   The main risk factor for this kind of sleep apnea is excessive weight gain. Fatty tissue gathers along the sides of the throat causing the air passage to be more narrow than normal.   Increasing age is another factor due to softening of the air passage.   Certain neck and jaw shapes are prone to a narrow air passage (such as receding chin)   Enlarged tonsils and adenoids may block the air passage when lying down   Severe nasal congestion   Use of alcohol or sedatives relaxes the muscles in the throat.   Smoking can cause inflammation and swelling in the upper air passages and make this problem worse.  HOME CARE   1. Sleep with your head and neck in a straight or neutral position. If the neck falls back or forward too far this can block breathing.   2. Limit the use of alcohol and sedatives in the evening.  FOLLOW UP with your doctor as advised. If you are overweight, talk to your doctor about a weight loss program. If you smoke, talk to your doctor about ways to help you stop.   GET PROMPT MEDICAL ATTENTION if any of the following occur:   Longer  pauses than usual   Unable to awaken   Seizure  © 7117-8943 [facility], [address] [place] [address]. All rights reserved. This information is not intended as a substitute for professional medical care. Always follow         Continuous Positive Air Pressure (CPAP)   Continuous positive air pressure (CPAP) uses gentle air pressure to hold the airway open. CPAP is often the most effective treatment for sleep apnea and severe snoring. It works very well for many people. But keep in mind that it can take several adjustments before the setup is right for you.   How CPAP Works   A small portable pump beside the bed sends air through a hose, which is held over your nose by a mask. Air is gently pushed through your airway. The air pressure nudges sagging tissues aside. This widens the airway so you can breathe better. CPAP may be combined with other kinds of therapy for sleep apnea.      A mask over the nose gently directs air into the throat to keep the airway open.      Types of Air Pressure Treatments   There are different types of CPAP. Your doctor or CPAP technician will help you decide which type is best for you:   Basic CPAP keeps the pressure constant all night long.   A bilevel device gives out more pressure when you breathe in and less when you breathe out.   An autoCPAP device automatically adjusts pressure throughout the night and in response to changes such as body position, sleep stage, and snoring.  © 5219-8610 [facility], [address] [place] [address]. All rights reserved. This information is not intended as a substitute for professional medical care. Always follow your       Snoring and Sleep Apnea: Notes for a Partner   Snoring and sleep apnea affect your life, as well as your partner’s. You can help in the treatment of the problem. Be supportive. Encourage your partner both to get treatment and to make the adjustments needed to follow through.   Adjusting to Changes    Your partner’s treatment may involve making changes to certain life habits. You can help your partner make and stick with these changes. For example:   Support and even join your partner’s exercise program.   Be supportive if your partner gets CPAP (continuous positive airway pressure). He or she may feel self-conscious at first. Remind your partner to expect adjustments to CPAP before it feels just right.   Consider joining a snoring and sleep apnea support group.  Go Along to See the Doctor   You can give the doctor the best account of your partner’s nighttime breathing and snoring patterns. Try to go along to doctor’s appointments. If you can’t go, write notes for your partner to give to the doctor. Describe your partner’s snoring and sleep breathing patterns in detail.   Tips for Sleeping with a Snorer   Until treatment takes care of your partner’s snoring:   Try to go to bed first. It may help if you’re already asleep when your partner starts to snore.   Wear earplugs to bed. A fan or other source of background noise may also help drown out snoring.    © 9740-2976 [employer], [address] [place] [address], [place] [address]. All rights reserved. This information is not intended as a substitute for professional medical care. Always follow                    Admission Reconciliation is Not Complete  Discharge Reconciliation is Not Complete Admission Reconciliation is Not Complete  Discharge Reconciliation is Completed

## 2024-03-06 NOTE — PHYSICAL THERAPY INITIAL EVALUATION ADULT - LIVES WITH, PROFILE
-CTH/CTA h/n show grossly stable imaging. Neuro exam appears grossly stable.   -at this time suspect sepsis encephalopathy/delirium in setting of sepsis given actuity and rapidity of return to baseline and stable imaging.   -cont to monitor closely with serial exams; if worsening mentation can get stat repeat imaging +/- neurosurgical eval.  Pt already seen in office last week no need for urgent consult overnight.  -start SCD, no pharm vte ppx.  -c/w keppra  -c/w oxy 5 q6 for pain prn. Has HHA 4 hours in AM, will be getting HHA in afternoon/alone

## 2024-03-06 NOTE — PROGRESS NOTE ADULT - ATTENDING COMMENTS
89yF, PMH of DM, gout, CAD (s/p1 stent in oct  2019), htn, anxiety/depression admitted for weakness likely 2/2 to UTI.    Patient seen and examined at bedside. Patient states she is feeling a bit better, but still endorsing dysuria. States she is having burning sensation every time she urinates. Denies any fevers, chills, chest pain, SOB, abd pain. Continue IV abx, urine cx showing 10k-49k gram positive organism- will f/u sensitivities. Start pyridium for dysuria. PT consult. Plan of care discussed with son at bedside.

## 2024-03-06 NOTE — DISCHARGE NOTE PROVIDER - NSDCMRMEDTOKEN_GEN_ALL_CORE_FT
allopurinol 100 mg oral tablet: 1 tab(s) orally once a day  amLODIPine 5 mg oral tablet: 1 tab(s) orally once a day  aspirin 81 mg oral capsule: 1 orally  atorvastatin 40 mg oral tablet: 1 tab(s) orally once a day  buPROPion 300 mg/24 hours (XL) oral tablet, extended release: 1 tab(s) orally once a day  Janumet 50 mg-1000 mg oral tablet: 1 tab(s) orally 2 times a day  Lexapro 10 mg oral tablet: 1 tab(s) orally once a day  lisinopril 40 mg oral tablet: 1 tab(s) orally once a day  metoprolol tartrate 75 mg oral tablet: 1 tab(s) orally once a day  pantoprazole 40 mg oral delayed release tablet: 1 tab(s) orally once a day (before a meal)   acetaminophen 325 mg oral tablet: 2 tab(s) orally every 6 hours As needed Temp greater or equal to 38C (100.4F), Mild Pain (1 - 3)  allopurinol 100 mg oral tablet: 1 tab(s) orally once a day  amLODIPine 5 mg oral tablet: 1 tab(s) orally once a day  aspirin 81 mg oral delayed release tablet: 1 tab(s) orally once a day  atorvastatin 40 mg oral tablet: 1 tab(s) orally once a day  buPROPion 300 mg/24 hours (XL) oral tablet, extended release: 1 tab(s) orally once a day  Janumet 50 mg-1000 mg oral tablet: 1 tab(s) orally 2 times a day  Lexapro 10 mg oral tablet: 1 tab(s) orally once a day  lisinopril 40 mg oral tablet: 1 tab(s) orally once a day  metoprolol tartrate 75 mg oral tablet: 1 tab(s) orally once a day  pantoprazole 40 mg oral delayed release tablet: 1 tab(s) orally once a day (before a meal)  phenazopyridine 200 mg oral tablet: 1 tab(s) orally every 8 hours  polyethylene glycol 3350 oral powder for reconstitution: 17 gram(s) orally once a day as needed for  constipation

## 2024-03-06 NOTE — DISCHARGE NOTE PROVIDER - NSDCFUSCHEDAPPT_GEN_ALL_CORE_FT
[facility] Physician [facility]  UROGYN [address]  Scheduled Appointment: 03/20/2024

## 2024-03-06 NOTE — PHYSICAL THERAPY INITIAL EVALUATION ADULT - ADDITIONAL COMMENTS
Patient lives in private home, +BECKY and flight to bedroom with chair lift. Patient was independent in ambulation with RW and ADLs.  HHAs assisted with bathing

## 2024-03-06 NOTE — DISCHARGE NOTE PROVIDER - HOSPITAL COURSE
FROM ADMISSION H+P:   HPI:  89yF, PMH of DM, gout, CAD (s/p1 stent in oct  2019), htn, anxiety/depression presenting with one day duration of weakness, abdominal pain, pain with urination with N/V. Story as per daughter over the phone. Patient was being taken to multiple doctors appointments, when she realized the patient could no longer stand up because she was so weak, Patient between doctors appointments grew more lethargic and weak throughout the day. During the appoint at the urogyn office, patient began to get chills and shake. Of note patient has had stomach pain, NV, for over 4 months, it has just gotten worse recently today. Patient currently denies CP, SOB, blurry vision, leg swelling. ROS positive for burning and pain with urination,, lower abdominal tenderness.    In the ED pts VS wereT(F): 97.7 HR: 67 BP: 106/61 RR: 18 SpO2: 93%  Labs show: H.6   WBC:8.96  Plt:270  Creatinine: .87 Lactate 2.5 ->.8 mg 1.0 UA: protein 300, large leuks, blood moderate, wbc, bacteria    CTchest a/p:   No pulmonary embolism.    Peripheral reticular lung opacities throughout the lungs suggestive of a   fibrotic process and/or pulmonary edema. Correlate clinically.    Thickened urinary bladder wall with mild inflammatory changes in the   adjacent fat. Question cystitis. Correlate clinically.       S/p: 2g mag, zofran 4mg, rocephin 1g, IV tylenol 1g, famotidie 20mg, 1L NS    Pt is admitted for further workup and management   (05 Mar 2024 02:46)      ---  HOSPITAL COURSE/PERTINENT LABS/PROCEDURES PERFORMED/PENDING TESTS: Patient was admitted for weakness secondary to UTI. Patient was seen and evaluated by infectious disease. Patient was treated with IV Rocephin. Lactate downtrended from 2.8 to 0.8 after IV fluids. Doppler US showed no evidence of deep venous thrombosis in either lower extremity. Blood cultures showed no growth. Urine culture grew 10,000-49,000 gram positive organisms. TTE showed ______*****______________. Patient's home medications metoprolol tartrate, lisinopril, amlodipine were continued inpatient.     Physical therapy evaluated the patient and recommended home PT.  Social work/case management followed the patient case. Patient is seen and examined on the day of the discharge. Patient is stable for discharge to [home/[facility]].       ---  PATIENT CONDITION:  - stable    ---  Vitals:    PHYSICAL EXAM ON DAY OF DISCHARGE:    ---  CONSULTANTS:   Pulmonology - Dr. Mora    ---  ADVANCED CARE PLANNING:  - Code status:      - MOLST completed:      [  ] NO     [  ] YES    ---  TIME SPENT:  I, the attending physician, was physically present for the key portions of the evaluation and management (E/M) service provided. The total amount of time spent reviewing the hospital notes, laboratory values, imaging findings, assessing/counseling the patient, discussing with consultant physicians, social work, nursing staff was -- minutes FROM ADMISSION H+P:   HPI:  89yF, PMH of DM, gout, CAD (s/p1 stent in oct  2019), htn, anxiety/depression presenting with one day duration of weakness, abdominal pain, pain with urination with N/V. Story as per daughter over the phone. Patient was being taken to multiple doctors appointments, when she realized the patient could no longer stand up because she was so weak, Patient between doctors appointments grew more lethargic and weak throughout the day. During the appoint at the urogyn office, patient began to get chills and shake. Of note patient has had stomach pain, NV, for over 4 months, it has just gotten worse recently today. Patient currently denies CP, SOB, blurry vision, leg swelling. ROS positive for burning and pain with urination, lower abdominal tenderness.    In the ED pts VS were T(F): 97.7 HR: 67 BP: 106/61 RR: 18 SpO2: 93%  Labs show: H.6   WBC:8.96  Plt:270  Creatinine: .87 Lactate 2.5 ->.8 mg 1.0 UA: protein 300, large leuks, blood moderate, wbc, bacteria    CTchest a/p:   No pulmonary embolism.    Peripheral reticular lung opacities throughout the lungs suggestive of a   fibrotic process and/or pulmonary edema. Correlate clinically.    Thickened urinary bladder wall with mild inflammatory changes in the   adjacent fat. Question cystitis. Correlate clinically.       S/p: 2g mag, zofran 4mg, rocephin 1g, IV tylenol 1g, famotidie 20mg, 1L NS    Pt is admitted for further workup and management   (05 Mar 2024 02:46)    ---  HOSPITAL COURSE/PERTINENT LABS/PROCEDURES PERFORMED/PENDING TESTS: Patient was admitted for weakness secondary to UTI. Patient was seen and evaluated by infectious disease. Patient was treated with IV Rocephin. Lactate downtrended from 2.8 to 0.8 after IV fluids. Doppler US showed no evidence of deep venous thrombosis in either lower extremity. Blood cultures showed no growth. Urine culture grew 10,000-49,000 gram positive organisms. TTE showed an EF of 50%, mild LV hypertrophy, Grade I diastolic dysfunction, with trace mitral, aortic, tricuspid regurgitation. Patient's home medications metoprolol tartrate, lisinopril, amlodipine were continued inpatient. Patient was treated with IV ceftriaxone for treatment of her UTI.    Physical therapy evaluated the patient and recommended home PT.  Social work/case management followed the patient case. Patient is seen and examined on the day of the discharge. Patient is stable for discharge to home.       ---  PATIENT CONDITION:  - stable    ---  Vitals:      PHYSICAL EXAM ON DAY OF DISCHARGE:    ---  CONSULTANTS:   Pulmonology - Dr. Mora    ---  ADVANCED CARE PLANNING:  - Code status:      - MOLST completed:      [  ] NO     [  ] YES    ---  TIME SPENT:  I, the attending physician, was physically present for the key portions of the evaluation and management (E/M) service provided. The total amount of time spent reviewing the hospital notes, laboratory values, imaging findings, assessing/counseling the patient, discussing with consultant physicians, social work, nursing staff was -- minutes FROM ADMISSION H+P:   HPI:  89yF, PMH of DM, gout, CAD (s/p1 stent in oct  2019), htn, anxiety/depression presenting with one day duration of weakness, abdominal pain, pain with urination with N/V. Story as per daughter over the phone. Patient was being taken to multiple doctors appointments, when she realized the patient could no longer stand up because she was so weak, Patient between doctors appointments grew more lethargic and weak throughout the day. During the appoint at the urogyn office, patient began to get chills and shake. Of note patient has had stomach pain, NV, for over 4 months, it has just gotten worse recently today. Patient currently denies CP, SOB, blurry vision, leg swelling. ROS positive for burning and pain with urination, lower abdominal tenderness.    In the ED pts VS were T(F): 97.7 HR: 67 BP: 106/61 RR: 18 SpO2: 93%  Labs show: H.6   WBC:8.96  Plt:270  Creatinine: .87 Lactate 2.5 ->.8 mg 1.0 UA: protein 300, large leuks, blood moderate, wbc, bacteria    CTchest a/p:   No pulmonary embolism.    Peripheral reticular lung opacities throughout the lungs suggestive of a   fibrotic process and/or pulmonary edema. Correlate clinically.    Thickened urinary bladder wall with mild inflammatory changes in the   adjacent fat. Question cystitis. Correlate clinically.    S/p: 2g mag, zofran 4mg, rocephin 1g, IV tylenol 1g, famotidie 20mg, 1L NS    Pt is admitted for further workup and management   (05 Mar 2024 02:46)    ---  HOSPITAL COURSE/PERTINENT LABS/PROCEDURES PERFORMED/PENDING TESTS: Patient was admitted for weakness secondary to UTI. Patient was seen and evaluated by infectious disease. Patient was treated with IV Rocephin. Lactate downtrended from 2.8 to 0.8 after IV fluids. Doppler US showed no evidence of deep venous thrombosis in either lower extremity. Blood cultures showed no growth. Urine culture initially grew 10,000-49,000 gram positive organisms, but final culture with normal urogenital baldemar. TTE showed an EF of 50%, mild LV hypertrophy, Grade I diastolic dysfunction, with trace mitral, aortic, tricuspid regurgitation. Patient's home medications metoprolol tartrate, lisinopril, amlodipine were continued inpatient. Patient was treated with 3 day course of IV ceftriaxone for treatment of her UTI.    Physical therapy evaluated the patient and recommended home PT.  Social work/case management followed the patient case. Patient is seen and examined on the day of the discharge. Patient is stable for discharge to home.       ---  PATIENT CONDITION:  - stable    ---  CONSULTANTS:   Pulmonology - Dr. Mora    ---  TIME SPENT:  I, the attending physician, was physically present for the key portions of the evaluation and management (E/M) service provided. The total amount of time spent reviewing the hospital notes, laboratory values, imaging findings, assessing/counseling the patient, discussing with consultant physicians, social work, nursing staff was 35 minutes

## 2024-03-06 NOTE — PHYSICAL THERAPY INITIAL EVALUATION ADULT - PERTINENT HX OF CURRENT PROBLEM, REHAB EVAL
89yF, PMH of DM, gout, CAD (s/p1 stent in oct  2019), htn, anxiety/depression presenting with one day duration of weakness, abdominal pain, pain with urination with N/V. Story as per daughter over the phone. Patient was being taken to multiple doctors appointments, when she realized the patient could no longer stand up because she was so weak, Patient between doctors appointments grew more lethargic and weak throughout the day. During the appoint at the urogyn office, patient began to get chills and shake. Of note patient has had stomach pain, NV, for over 4 months, it has just gotten worse recently today. Patient currently denies CP, SOB, blurry vision, leg swelling. ROS positive for burning and pain with urination,, lower abdominal tenderness.

## 2024-03-06 NOTE — CASE MANAGEMENT PROGRESS NOTE - NSCMPROGRESSNOTE_GEN_ALL_CORE
Discussed patient on rounds this morning and with MD. Per MD, anticipate discharge within 24-48 hours. Patient continues on IV Rocephin and is pending a PT eval. CM met with the patient and her son Alfred at the bedside. Permission obtained to speak with Alfred present. Per Alfred, the patient has a private hire aide 4 hours a day/7 days a week. Alfred stated the patient was approved for Medicaid and they were in the process of getting an aide back in October, which did not happen. CM advised that PT will evaluate the patient and make recommendations. CM provided home care choice and [facility] list in resource folder to the patient and son. CM consult noted for stairs-completed. Patient has 2 stairs outside and a flight of stairs to the bedroom +chairlift. Per Alfred, the patient has a RW, cane and wheelchair PTA. CM remains available for transition planning when medically cleared.

## 2024-03-07 ENCOUNTER — TRANSCRIPTION ENCOUNTER (OUTPATIENT)
Age: 89
End: 2024-03-07

## 2024-03-07 VITALS
TEMPERATURE: 98 F | HEART RATE: 75 BPM | OXYGEN SATURATION: 92 % | DIASTOLIC BLOOD PRESSURE: 63 MMHG | RESPIRATION RATE: 17 BRPM | SYSTOLIC BLOOD PRESSURE: 104 MMHG

## 2024-03-07 LAB
ANION GAP SERPL CALC-SCNC: 7 MMOL/L — SIGNIFICANT CHANGE UP (ref 5–17)
BUN SERPL-MCNC: 15 MG/DL — SIGNIFICANT CHANGE UP (ref 7–23)
CALCIUM SERPL-MCNC: 7.9 MG/DL — LOW (ref 8.5–10.1)
CHLORIDE SERPL-SCNC: 104 MMOL/L — SIGNIFICANT CHANGE UP (ref 96–108)
CO2 SERPL-SCNC: 29 MMOL/L — SIGNIFICANT CHANGE UP (ref 22–31)
CREAT SERPL-MCNC: 0.59 MG/DL — SIGNIFICANT CHANGE UP (ref 0.5–1.3)
EGFR: 86 ML/MIN/1.73M2 — SIGNIFICANT CHANGE UP
GLUCOSE SERPL-MCNC: 245 MG/DL — HIGH (ref 70–99)
HCT VFR BLD CALC: 32.8 % — LOW (ref 34.5–45)
HGB BLD-MCNC: 10.7 G/DL — LOW (ref 11.5–15.5)
MCHC RBC-ENTMCNC: 28.5 PG — SIGNIFICANT CHANGE UP (ref 27–34)
MCHC RBC-ENTMCNC: 32.6 GM/DL — SIGNIFICANT CHANGE UP (ref 32–36)
MCV RBC AUTO: 87.5 FL — SIGNIFICANT CHANGE UP (ref 80–100)
NRBC # BLD: 0 /100 WBCS — SIGNIFICANT CHANGE UP (ref 0–0)
PLATELET # BLD AUTO: 246 K/UL — SIGNIFICANT CHANGE UP (ref 150–400)
POTASSIUM SERPL-MCNC: 3.7 MMOL/L — SIGNIFICANT CHANGE UP (ref 3.5–5.3)
POTASSIUM SERPL-SCNC: 3.7 MMOL/L — SIGNIFICANT CHANGE UP (ref 3.5–5.3)
RBC # BLD: 3.75 M/UL — LOW (ref 3.8–5.2)
RBC # FLD: 13.1 % — SIGNIFICANT CHANGE UP (ref 10.3–14.5)
SODIUM SERPL-SCNC: 140 MMOL/L — SIGNIFICANT CHANGE UP (ref 135–145)
WBC # BLD: 8.41 K/UL — SIGNIFICANT CHANGE UP (ref 3.8–10.5)
WBC # FLD AUTO: 8.41 K/UL — SIGNIFICANT CHANGE UP (ref 3.8–10.5)

## 2024-03-07 PROCEDURE — 81001 URINALYSIS AUTO W/SCOPE: CPT

## 2024-03-07 PROCEDURE — 70450 CT HEAD/BRAIN W/O DYE: CPT | Mod: MC

## 2024-03-07 PROCEDURE — 99239 HOSP IP/OBS DSCHRG MGMT >30: CPT

## 2024-03-07 PROCEDURE — 85027 COMPLETE CBC AUTOMATED: CPT

## 2024-03-07 PROCEDURE — 80048 BASIC METABOLIC PNL TOTAL CA: CPT

## 2024-03-07 PROCEDURE — 74177 CT ABD & PELVIS W/CONTRAST: CPT | Mod: MC

## 2024-03-07 PROCEDURE — 93306 TTE W/DOPPLER COMPLETE: CPT

## 2024-03-07 PROCEDURE — 82553 CREATINE MB FRACTION: CPT

## 2024-03-07 PROCEDURE — 96374 THER/PROPH/DIAG INJ IV PUSH: CPT

## 2024-03-07 PROCEDURE — 85730 THROMBOPLASTIN TIME PARTIAL: CPT

## 2024-03-07 PROCEDURE — 87077 CULTURE AEROBIC IDENTIFY: CPT

## 2024-03-07 PROCEDURE — 93005 ELECTROCARDIOGRAM TRACING: CPT

## 2024-03-07 PROCEDURE — 97116 GAIT TRAINING THERAPY: CPT

## 2024-03-07 PROCEDURE — 87040 BLOOD CULTURE FOR BACTERIA: CPT

## 2024-03-07 PROCEDURE — 87086 URINE CULTURE/COLONY COUNT: CPT

## 2024-03-07 PROCEDURE — 84443 ASSAY THYROID STIM HORMONE: CPT

## 2024-03-07 PROCEDURE — 85610 PROTHROMBIN TIME: CPT

## 2024-03-07 PROCEDURE — 97530 THERAPEUTIC ACTIVITIES: CPT

## 2024-03-07 PROCEDURE — 71045 X-RAY EXAM CHEST 1 VIEW: CPT

## 2024-03-07 PROCEDURE — 87637 SARSCOV2&INF A&B&RSV AMP PRB: CPT

## 2024-03-07 PROCEDURE — 99285 EMERGENCY DEPT VISIT HI MDM: CPT

## 2024-03-07 PROCEDURE — 84100 ASSAY OF PHOSPHORUS: CPT

## 2024-03-07 PROCEDURE — 83036 HEMOGLOBIN GLYCOSYLATED A1C: CPT

## 2024-03-07 PROCEDURE — 80061 LIPID PANEL: CPT

## 2024-03-07 PROCEDURE — 93970 EXTREMITY STUDY: CPT

## 2024-03-07 PROCEDURE — 80053 COMPREHEN METABOLIC PANEL: CPT

## 2024-03-07 PROCEDURE — 83605 ASSAY OF LACTIC ACID: CPT

## 2024-03-07 PROCEDURE — 83735 ASSAY OF MAGNESIUM: CPT

## 2024-03-07 PROCEDURE — 82550 ASSAY OF CK (CPK): CPT

## 2024-03-07 PROCEDURE — 36415 COLL VENOUS BLD VENIPUNCTURE: CPT

## 2024-03-07 PROCEDURE — 83880 ASSAY OF NATRIURETIC PEPTIDE: CPT

## 2024-03-07 PROCEDURE — 97162 PT EVAL MOD COMPLEX 30 MIN: CPT

## 2024-03-07 PROCEDURE — 84484 ASSAY OF TROPONIN QUANT: CPT

## 2024-03-07 PROCEDURE — 96375 TX/PRO/DX INJ NEW DRUG ADDON: CPT

## 2024-03-07 PROCEDURE — 85025 COMPLETE CBC W/AUTO DIFF WBC: CPT

## 2024-03-07 PROCEDURE — 82962 GLUCOSE BLOOD TEST: CPT

## 2024-03-07 PROCEDURE — 83690 ASSAY OF LIPASE: CPT

## 2024-03-07 PROCEDURE — 71275 CT ANGIOGRAPHY CHEST: CPT | Mod: MC

## 2024-03-07 RX ORDER — POLYETHYLENE GLYCOL 3350 17 G/17G
17 POWDER, FOR SOLUTION ORAL DAILY
Refills: 0 | Status: DISCONTINUED | OUTPATIENT
Start: 2024-03-07 | End: 2024-03-07

## 2024-03-07 RX ORDER — ASPIRIN/CALCIUM CARB/MAGNESIUM 324 MG
1 TABLET ORAL
Qty: 0 | Refills: 0 | DISCHARGE
Start: 2024-03-07

## 2024-03-07 RX ORDER — POLYETHYLENE GLYCOL 3350 17 G/17G
17 POWDER, FOR SOLUTION ORAL
Qty: 0 | Refills: 0 | DISCHARGE
Start: 2024-03-07

## 2024-03-07 RX ORDER — PHENAZOPYRIDINE HCL 100 MG
1 TABLET ORAL
Qty: 6 | Refills: 0
Start: 2024-03-07 | End: 2024-03-08

## 2024-03-07 RX ORDER — ASPIRIN/CALCIUM CARB/MAGNESIUM 324 MG
1 TABLET ORAL
Refills: 0 | DISCHARGE

## 2024-03-07 RX ORDER — ACETAMINOPHEN 500 MG
2 TABLET ORAL
Qty: 0 | Refills: 0 | DISCHARGE
Start: 2024-03-07

## 2024-03-07 RX ADMIN — LISINOPRIL 40 MILLIGRAM(S): 2.5 TABLET ORAL at 06:31

## 2024-03-07 RX ADMIN — AMLODIPINE BESYLATE 5 MILLIGRAM(S): 2.5 TABLET ORAL at 06:31

## 2024-03-07 RX ADMIN — ESCITALOPRAM OXALATE 10 MILLIGRAM(S): 10 TABLET, FILM COATED ORAL at 12:10

## 2024-03-07 RX ADMIN — Medication 8: at 13:22

## 2024-03-07 RX ADMIN — Medication 200 MILLIGRAM(S): at 16:31

## 2024-03-07 RX ADMIN — Medication 200 MILLIGRAM(S): at 06:28

## 2024-03-07 RX ADMIN — BUPROPION HYDROCHLORIDE 300 MILLIGRAM(S): 150 TABLET, EXTENDED RELEASE ORAL at 12:10

## 2024-03-07 RX ADMIN — ENOXAPARIN SODIUM 40 MILLIGRAM(S): 100 INJECTION SUBCUTANEOUS at 06:31

## 2024-03-07 RX ADMIN — Medication 75 MILLIGRAM(S): at 06:29

## 2024-03-07 RX ADMIN — PANTOPRAZOLE SODIUM 40 MILLIGRAM(S): 20 TABLET, DELAYED RELEASE ORAL at 06:28

## 2024-03-07 RX ADMIN — Medication 1 TABLET(S): at 12:10

## 2024-03-07 RX ADMIN — Medication 81 MILLIGRAM(S): at 12:10

## 2024-03-07 RX ADMIN — Medication 100 MILLIGRAM(S): at 12:10

## 2024-03-07 NOTE — DISCHARGE NOTE NURSING/CASE MANAGEMENT/SOCIAL WORK - NSDCPETBCESMAN_GEN_ALL_CORE
History of Present Illness  Ms. Montse Elizabeth returns after hospitalization for dyspnea and foot ulceration.  She is a  72 year-old woman with hypertension, hyperlipidemia, diabetes mellitus, end stage renal disease on HD, COPD, atrial fibrillation and CAD s/p CABG. she was admitted to the hospital in September 2021 with dyspnea and left great toe ulceration and gangrene.  She was treated medically and now returns for consideration of revascularization for limb salvage.    Past Medical History  Past Medical History:   Diagnosis Date   • COPD (chronic obstructive pulmonary disease) (CMS/[facility])    • Diabetes mellitus (CMS/[facility])    • ESRD (end stage renal disease) on dialysis (CMS/[facility])    • Essential (primary) hypertension    • Myocardial infarction (CMS/[facility])    • RAD (reactive airway disease)      Past Surgical History  Past Surgical History:   Procedure Laterality Date   • Coronary artery bypass graft     • Dialysis fistula creation Right 10/19/2020    RIGHT ARM BASILIC VEIN TRANSPOSITION FISTULA CREATION   • Other surgical history Right 01/12/2021    RIGHT UPPER ARM GRAFT GREATION     Medications     Summary of your Discharge Medications          Accurate as of October 5, 2021  2:49 PM. Always use your most recent med list.            Take these Medications      Details   albuterol 108 (90 Base) MCG/ACT inhaler   Inhale 2 puffs into the lungs every 4 hours as needed for Shortness of Breath or Wheezing.     AMIODarone 200 MG tablet  Commonly known as: PACERONE   Take 200 mg by mouth daily.     apixaBAN 2.5 MG Tab  Commonly known as: ELIQUIS   Take 2.5 mg by mouth 2 times daily.     ASPIRIN 81 PO   Take 81 mg by mouth daily.     atorvastatin 40 MG tablet  Commonly known as: LIPITOR   Take 40 mg by mouth daily.     carvedilol 12.5 MG tablet  Commonly known as: COREG   Take 1 tablet by mouth every 12 hours.     docusate sodium 100 MG capsule  Commonly known as: COLACE   Take 100 mg by mouth daily.     ferrous sulfate 324  MG Tablet Enteric Coated   Take 324 mg by mouth daily.     insulin aspart 100 UNIT/ML injectable solution  Commonly known as: NovoLOG   Inject 10 Units into the skin 3 times daily (before meals).     insulin detemir 100 UNIT/ML injectable solution  Commonly known as: LEVEMIR   20 Units nightly.     levothyroxine 25 MCG tablet   Take 25 mcg by mouth daily.     lisinopril 10 MG tablet  Commonly known as: ZESTRIL   Take 10 mg by mouth daily.     mirtazapine 15 MG tablet  Commonly known as: REMERON   Take 15 mg by mouth nightly.     NIFEdipine CC 30 MG 24 hr tablet  Commonly known as: ADALAT CC   Take 30 mg by mouth daily.     pantoprazole 40 MG tablet  Commonly known as: PROTONIX   Take 40 mg by mouth daily.          Allergies  ALLERGIES:  Patient has no known allergies.    Physical Examination  Visit Vitals  BP (!) 144/72 (BP Location: LUE - Left upper extremity, Patient Position: Sitting, Cuff Size: Regular)   Pulse 72   Temp 97.1 °F (36.2 °C) (Temporal)   Resp 17   Ht 5' 2\" (1.575 m)   Wt 62.1 kg (137 lb)   SpO2 96%   BMI 25.06 kg/m²     On physical examination, the patient is awake and alert.  The femoral pulses are not strong.  The feet are cool bilaterally.  There is an ulcer overlying the toenail of the left hallux; the left first and second toes are gangrenous.  The forefoot is erythematous..  There is no purulence or discharge.    Imaging  09/05/21  Venous Duplex:  IMPRESSION:  No evidence of acute DVT.    09/07/21 LARRY:  IMPRESSION:  Abnormal appearing calcified arteries at the ankle level bilaterally with a  suspicion for severe to critical peripheral vascular disease.  There is poor perfusion observed in the evaluated bilateral great toes.    09/09/21  CTA:  VASCULAR EXAM:  Abdomen/Pelvis: There are moderate to severe atherosclerotic changes of the abdominal aorta  and major branch vessels.  The infrarenal aorta narrows to 9 mm in diameter, with areas of soft and ulcerated plaque.  Aorta is nonaneurysmal.   Mild to moderate celiac ostial narrowing.  SMA is mildly narrowed at its origin CANDY is diseased but patent.  Bilateral renal arteries with areas of moderate stenosis.  There is mild narrowing of the common iliac arteries bilaterally.  There is moderate narrowing of both right and left external iliac arteries.  Internal iliac arteries are heavily diseased but patent bilaterally.  Right lower extremity: Mild common femoral artery narrowing.  Profunda femoris branches are diseased but patent.  SFA with moderate to severe diffuse disease, near occlusive distally.  Popliteal artery with severe stenoses both proximally and distally.  Anterior tibial artery is occluded.  Posterior tibial and peroneal arteries are patent to the ankle.  Within the foot there is reconstitution of dorsalis pedis artery and patent plantar branches.  Left lower extremity: Mild common femoral artery narrowing.  Profunda femoris branches are diseased but patent.  There is long segment severe narrowing of the superficial femoral artery.  Popliteal artery is severely narrowed near the level of the knee joint.  Anterior tibial artery is occluded in the leg with distal reconstitution.  Posterior tibial and peroneal arteries are patent throughout the leg.  Dorsalis pedis artery and plantar branches are opacified in the foot.  IMPRESSION:  1.  Severe femoropopliteal disease bilaterally without definite occlusion.  2.  Bilateral anterior tibial artery occlusion.  Posterior tibial and peroneal arteries are patent throughout the legs.  3.  Distal aortic luminal narrowing to 9 mm.  Moderate bilateral external iliac artery stenoses.  4.  Renal artery narrowing is moderate to severe bilaterally.  5.  Bilateral renal cortical atrophy.  Bladder wall thickening and striated nephrograms for which urinary tract infection and pyelonephritis cannot be excluded.                Assessment & Plan  In summary, Mrs. Elizabeth presents with critical left limb ischemia with  rest pain, gangrene and end-stage renal failure.  We made plans for left external iliac artery stenting likely to be followed by femoro-popliteal bypass grafting.    Jacek Servin M.D.     If you are a smoker, it is important for your health to stop smoking. Please be aware that second hand smoke is also harmful.

## 2024-03-07 NOTE — PROGRESS NOTE ADULT - SUBJECTIVE AND OBJECTIVE BOX
Patient is a 89y old  Female who presents with a chief complaint of uti (06 Mar 2024 07:25)      Subjective:  INTERVAL HPI/OVERNIGHT EVENTS: Patient seen and examined at bedside. Patient c/o dysuria and weakness, but admits that severity has decreased. Left calf ttp. Abdominal pain has resolved since admission. Denies any N/V/D. Otherwise denies any other complaints.       MEDICATIONS  (STANDING):  allopurinol 100 milliGRAM(s) Oral daily  amLODIPine   Tablet 5 milliGRAM(s) Oral daily  aspirin enteric coated 81 milliGRAM(s) Oral daily  atorvastatin 40 milliGRAM(s) Oral at bedtime  buPROPion XL (24-Hour) . 300 milliGRAM(s) Oral daily  cefTRIAXone   IVPB 1000 milliGRAM(s) IV Intermittent every 24 hours  dextrose 5%. 1000 milliLiter(s) (50 mL/Hr) IV Continuous <Continuous>  dextrose 5%. 1000 milliLiter(s) (100 mL/Hr) IV Continuous <Continuous>  dextrose 50% Injectable 25 Gram(s) IV Push once  dextrose 50% Injectable 12.5 Gram(s) IV Push once  dextrose 50% Injectable 25 Gram(s) IV Push once  enoxaparin Injectable 40 milliGRAM(s) SubCutaneous every 24 hours  escitalopram 10 milliGRAM(s) Oral daily  glucagon  Injectable 1 milliGRAM(s) IntraMuscular once  insulin lispro (ADMELOG) corrective regimen sliding scale   SubCutaneous three times a day before meals  insulin lispro (ADMELOG) corrective regimen sliding scale   SubCutaneous at bedtime  lisinopril 40 milliGRAM(s) Oral daily  metoprolol tartrate 75 milliGRAM(s) Oral daily  multivitamin 1 Tablet(s) Oral daily  pantoprazole    Tablet 40 milliGRAM(s) Oral before breakfast  phenazopyridine 200 milliGRAM(s) Oral every 8 hours  potassium phosphate / sodium phosphate Powder (PHOS-NaK) 1 Packet(s) Oral once    MEDICATIONS  (PRN):  acetaminophen     Tablet .. 650 milliGRAM(s) Oral every 6 hours PRN Temp greater or equal to 38C (100.4F), Mild Pain (1 - 3)  dextrose Oral Gel 15 Gram(s) Oral once PRN Blood Glucose LESS THAN 70 milliGRAM(s)/deciliter  melatonin 3 milliGRAM(s) Oral at bedtime PRN Insomnia  ondansetron Injectable 4 milliGRAM(s) IV Push every 8 hours PRN Nausea and/or Vomiting      Allergies    No Known Allergies    Intolerances        REVIEW OF SYSTEMS:  CONSTITUTIONAL: No fever or chills  HEENT:  No headache, no sore throat  RESPIRATORY: No cough, wheezing, or shortness of breath  CARDIOVASCULAR: No chest pain, palpitations  GASTROINTESTINAL: No abd pain, nausea, vomiting, or diarrhea  GENITOURINARY: +Dysuria  NEUROLOGICAL: + weakness  MUSCULOSKELETAL: +left calf pain    Objective:  Vital Signs Last 24 Hrs  T(C): 36.4 (06 Mar 2024 11:04), Max: 37.2 (05 Mar 2024 17:19)  T(F): 97.6 (06 Mar 2024 11:04), Max: 99 (05 Mar 2024 17:19)  HR: 79 (06 Mar 2024 11:04) (79 - 92)  BP: 111/63 (06 Mar 2024 11:04) (107/60 - 145/72)  BP(mean): --  RR: 17 (06 Mar 2024 11:04) (16 - 18)  SpO2: 96% (06 Mar 2024 11:04) (92% - 96%)    Parameters below as of 06 Mar 2024 11:04  Patient On (Oxygen Delivery Method): room air        GENERAL: NAD, sitting in patient chair and eating breakfast comfortably  HEAD:  Atraumatic, Normocephalic  EYES: EOMI, PERRLA, conjunctiva and sclera clear  ENT: Moist mucous membranes  NECK: Supple, No JVD  CHEST/LUNG: Clear to auscultation bilaterally; No rales, rhonchi, wheezing, or rubs. Unlabored respirations  HEART: Regular rate and rhythm; No murmurs, rubs, or gallops  ABDOMEN: Bowel sounds present; Soft, Nontender, Nondistended. No hepatomegaly  EXTREMITIES:  + Left calf ttp. No clubbing, cyanosis, or edema  NERVOUS SYSTEM:  Alert & Oriented X3, speech clear. No deficits   SKIN: No rashes or lesions    LABS:                        11.1   9.95  )-----------( 278      ( 06 Mar 2024 08:00 )             34.9     CBC Full  -  ( 06 Mar 2024 08:00 )  WBC Count : 9.95 K/uL  Hemoglobin : 11.1 g/dL  Hematocrit : 34.9 %  Platelet Count - Automated : 278 K/uL  Mean Cell Volume : 86.8 fl  Mean Cell Hemoglobin : 27.6 pg  Mean Cell Hemoglobin Concentration : 31.8 gm/dL  Auto Neutrophil # : 7.31 K/uL  Auto Lymphocyte # : 1.39 K/uL  Auto Monocyte # : 0.79 K/uL  Auto Eosinophil # : 0.36 K/uL  Auto Basophil # : 0.06 K/uL  Auto Neutrophil % : 73.5 %  Auto Lymphocyte % : 14.0 %  Auto Monocyte % : 7.9 %  Auto Eosinophil % : 3.6 %  Auto Basophil % : 0.6 %    06 Mar 2024 08:00    138    |  105    |  11     ----------------------------<  252    4.4     |  27     |  0.65     Ca    8.4        06 Mar 2024 08:00  Phos  2.0       06 Mar 2024 08:00  Mg     1.7       06 Mar 2024 08:00    TPro  7.1    /  Alb  2.7    /  TBili  0.2    /  DBili  x      /  AST  8      /  ALT  11     /  AlkPhos  60     06 Mar 2024 08:00    PT/INR - ( 05 Mar 2024 04:55 )   PT: 11.5 sec;   INR: 0.98 ratio         PTT - ( 05 Mar 2024 04:55 )  PTT:27.1 sec  Urinalysis Basic - ( 06 Mar 2024 08:00 )    Color: x / Appearance: x / SG: x / pH: x  Gluc: 252 mg/dL / Ketone: x  / Bili: x / Urobili: x   Blood: x / Protein: x / Nitrite: x   Leuk Esterase: x / RBC: x / WBC x   Sq Epi: x / Non Sq Epi: x / Bacteria: x      CAPILLARY BLOOD GLUCOSE      POCT Blood Glucose.: 211 mg/dL (06 Mar 2024 08:18)  POCT Blood Glucose.: 311 mg/dL (05 Mar 2024 21:45)  POCT Blood Glucose.: 210 mg/dL (05 Mar 2024 18:31)  POCT Blood Glucose.: 208 mg/dL (05 Mar 2024 12:55)        Culture - Urine (collected 03-04-24 @ 21:45)  Source: Clean Catch Clean Catch (Midstream)  Preliminary Report (03-05-24 @ 23:41):    10,000 - 49,000 CFU/mL Gram positive organisms    Culture - Blood (collected 03-04-24 @ 18:45)  Source: .Blood Blood-Peripheral  Preliminary Report (03-06-24 @ 01:03):    No growth at 24 hours    Culture - Blood (collected 03-04-24 @ 18:30)  Source: .Blood Blood-Peripheral  Preliminary Report (03-06-24 @ 01:03):    No growth at 24 hours        RADIOLOGY & ADDITIONAL TESTS:    US Doppler Lower Ext  IMPRESSION:  No evidence of deep venous thrombosis in either lower extremity.    Xray Chest  IMPRESSION:    Mild increased reticular peripheral opacities which may indicate early   signs of pulmonary edema of cardiac origin.    CT Abdomen and Pelvis w/IV Cont  IMPRESSION: Motion limited exam.    No pulmonary embolism.    Peripheral reticular lung opacities throughout the lungs suggestive of a   fibrotic process and/or pulmonary edema. Correlate clinically.    Thickened urinary bladder wall with mild inflammatory changes in the   adjacent fat. Question cystitis.               Personally reviewed.     Consultant(s) Notes Reviewed:  [x] YES  [ ] NO    
Date/Time Patient Seen:  		  Referring MD:   Data Reviewed	       Patient is a 89y old  Female who presents with a chief complaint of uti (05 Mar 2024 05:33)      Subjective/HPI     PAST MEDICAL & SURGICAL HISTORY:  Diabetes    Hypertension, unspecified type    Stented coronary artery    Hyperlipidemia, unspecified hyperlipidemia type    HTN (hypertension)    No significant past surgical history    No significant past surgical history          Medication list         MEDICATIONS  (STANDING):  allopurinol 100 milliGRAM(s) Oral daily  amLODIPine   Tablet 5 milliGRAM(s) Oral daily  aspirin enteric coated 81 milliGRAM(s) Oral daily  atorvastatin 40 milliGRAM(s) Oral at bedtime  buPROPion XL (24-Hour) . 300 milliGRAM(s) Oral daily  cefTRIAXone   IVPB 1000 milliGRAM(s) IV Intermittent every 24 hours  dextrose 5%. 1000 milliLiter(s) (50 mL/Hr) IV Continuous <Continuous>  dextrose 5%. 1000 milliLiter(s) (100 mL/Hr) IV Continuous <Continuous>  dextrose 50% Injectable 25 Gram(s) IV Push once  dextrose 50% Injectable 25 Gram(s) IV Push once  dextrose 50% Injectable 12.5 Gram(s) IV Push once  enoxaparin Injectable 40 milliGRAM(s) SubCutaneous every 24 hours  escitalopram 10 milliGRAM(s) Oral daily  glucagon  Injectable 1 milliGRAM(s) IntraMuscular once  insulin lispro (ADMELOG) corrective regimen sliding scale   SubCutaneous at bedtime  insulin lispro (ADMELOG) corrective regimen sliding scale   SubCutaneous three times a day before meals  lisinopril 40 milliGRAM(s) Oral daily  metoprolol tartrate 75 milliGRAM(s) Oral daily  multivitamin 1 Tablet(s) Oral daily  pantoprazole    Tablet 40 milliGRAM(s) Oral before breakfast    MEDICATIONS  (PRN):  acetaminophen     Tablet .. 650 milliGRAM(s) Oral every 6 hours PRN Temp greater or equal to 38C (100.4F), Mild Pain (1 - 3)  dextrose Oral Gel 15 Gram(s) Oral once PRN Blood Glucose LESS THAN 70 milliGRAM(s)/deciliter  melatonin 3 milliGRAM(s) Oral at bedtime PRN Insomnia  ondansetron Injectable 4 milliGRAM(s) IV Push every 8 hours PRN Nausea and/or Vomiting         Vitals log        ICU Vital Signs Last 24 Hrs  T(C): 36.6 (06 Mar 2024 06:02), Max: 37.2 (05 Mar 2024 17:19)  T(F): 97.9 (06 Mar 2024 06:02), Max: 99 (05 Mar 2024 17:19)  HR: 91 (06 Mar 2024 06:02) (90 - 92)  BP: 145/72 (06 Mar 2024 06:02) (107/60 - 145/72)  BP(mean): --  ABP: --  ABP(mean): --  RR: 16 (06 Mar 2024 06:02) (16 - 18)  SpO2: 92% (06 Mar 2024 06:02) (92% - 97%)    O2 Parameters below as of 06 Mar 2024 06:02  Patient On (Oxygen Delivery Method): room air                 Input and Output:  I&O's Detail    05 Mar 2024 07:01  -  06 Mar 2024 07:00  --------------------------------------------------------  IN:  Total IN: 0 mL    OUT:    Voided (mL): 700 mL  Total OUT: 700 mL    Total NET: -700 mL          Lab Data                        10.1   8.93  )-----------( 229      ( 05 Mar 2024 04:55 )             31.4     03-05    141  |  111<H>  |  18  ----------------------------<  160<H>  4.4   |  25  |  0.66    Ca    8.1<L>      05 Mar 2024 04:55  Mg     1.0     03-04    TPro  6.4  /  Alb  2.4<L>  /  TBili  0.3  /  DBili  x   /  AST  7<L>  /  ALT  10<L>  /  AlkPhos  47  03-05      CARDIAC MARKERS ( 04 Mar 2024 18:45 )  x     / x     / 48 U/L / x     / 1.7 ng/mL        Review of Systems	      Objective     Physical Examination    heart s1s2  lung dc BS      Pertinent Lab findings & Imaging      Taran:  NO   Adequate UO     I&O's Detail    05 Mar 2024 07:01  -  06 Mar 2024 07:00  --------------------------------------------------------  IN:  Total IN: 0 mL    OUT:    Voided (mL): 700 mL  Total OUT: 700 mL    Total NET: -700 mL               Discussed with:     Cultures:	        Radiology                            
Patient is a 89y old  Female who presents with a chief complaint of uti (06 Mar 2024 15:10)      Subjective:  INTERVAL HPI/OVERNIGHT EVENTS: Patient seen and examined at bedside. No overnight events occurred. Patient has no complaints at this time. Denies fevers, chills, headache, lightheadedness, chest pain, dyspnea, abdominal pain, n/v/d/c.    MEDICATIONS  (STANDING):  allopurinol 100 milliGRAM(s) Oral daily  amLODIPine   Tablet 5 milliGRAM(s) Oral daily  aspirin enteric coated 81 milliGRAM(s) Oral daily  atorvastatin 40 milliGRAM(s) Oral at bedtime  buPROPion XL (24-Hour) . 300 milliGRAM(s) Oral daily  dextrose 5%. 1000 milliLiter(s) (100 mL/Hr) IV Continuous <Continuous>  dextrose 5%. 1000 milliLiter(s) (50 mL/Hr) IV Continuous <Continuous>  dextrose 50% Injectable 25 Gram(s) IV Push once  dextrose 50% Injectable 12.5 Gram(s) IV Push once  dextrose 50% Injectable 25 Gram(s) IV Push once  enoxaparin Injectable 40 milliGRAM(s) SubCutaneous every 24 hours  escitalopram 10 milliGRAM(s) Oral daily  glucagon  Injectable 1 milliGRAM(s) IntraMuscular once  insulin lispro (ADMELOG) corrective regimen sliding scale   SubCutaneous three times a day before meals  insulin lispro (ADMELOG) corrective regimen sliding scale   SubCutaneous at bedtime  lisinopril 40 milliGRAM(s) Oral daily  metoprolol tartrate 75 milliGRAM(s) Oral daily  multivitamin 1 Tablet(s) Oral daily  pantoprazole    Tablet 40 milliGRAM(s) Oral before breakfast  phenazopyridine 200 milliGRAM(s) Oral every 8 hours  polyethylene glycol 3350 17 Gram(s) Oral daily    MEDICATIONS  (PRN):  acetaminophen     Tablet .. 650 milliGRAM(s) Oral every 6 hours PRN Temp greater or equal to 38C (100.4F), Mild Pain (1 - 3)  dextrose Oral Gel 15 Gram(s) Oral once PRN Blood Glucose LESS THAN 70 milliGRAM(s)/deciliter  melatonin 3 milliGRAM(s) Oral at bedtime PRN Insomnia  ondansetron Injectable 4 milliGRAM(s) IV Push every 8 hours PRN Nausea and/or Vomiting      Allergies    No Known Allergies    Intolerances        REVIEW OF SYSTEMS:  CONSTITUTIONAL: No fever or chills  HEENT:  No headache, no sore throat  RESPIRATORY: No cough, wheezing, or shortness of breath  CARDIOVASCULAR: No chest pain, palpitations  GASTROINTESTINAL: Complains of constipation. No abd pain, nausea, vomiting, or diarrhea  GENITOURINARY: Complains of dysuria, Denies frequency, or hematuria  NEUROLOGICAL: no focal weakness or dizziness  MUSCULOSKELETAL: no myalgias     Objective:  Vital Signs Last 24 Hrs  T(C): 36.4 (07 Mar 2024 11:36), Max: 36.6 (06 Mar 2024 20:36)  T(F): 97.6 (07 Mar 2024 11:36), Max: 97.9 (06 Mar 2024 20:36)  HR: 75 (07 Mar 2024 11:36) (75 - 79)  BP: 104/63 (07 Mar 2024 11:36) (104/63 - 162/75)  BP(mean): --  RR: 17 (07 Mar 2024 11:36) (17 - 19)  SpO2: 92% (07 Mar 2024 11:36) (92% - 95%)    Parameters below as of 07 Mar 2024 11:36  Patient On (Oxygen Delivery Method): room air        GENERAL: NAD, sitting in patient chair and eating breakfast comfortably  HEAD:  Atraumatic, Normocephalic  EYES: EOMI, conjunctiva and sclera clear  ENT: Moist mucous membranes  NECK: Supple, No JVD  CHEST/LUNG: Clear to auscultation bilaterally; No rales, rhonchi, wheezing, or rubs. Unlabored respirations  HEART: Regular rate and rhythm; No murmurs, rubs, or gallops  ABDOMEN: Bowel sounds present; Soft, Nontender, Nondistended. No hepatomegaly  EXTREMITIES: No clubbing, cyanosis, or edema  NERVOUS SYSTEM:  Alert & Oriented X3, speech clear. No deficits   SKIN: No rashes or lesions      LABS:                        10.7   8.41  )-----------( 246      ( 07 Mar 2024 06:10 )             32.8     CBC Full  -  ( 07 Mar 2024 06:10 )  WBC Count : 8.41 K/uL  Hemoglobin : 10.7 g/dL  Hematocrit : 32.8 %  Platelet Count - Automated : 246 K/uL  Mean Cell Volume : 87.5 fl  Mean Cell Hemoglobin : 28.5 pg  Mean Cell Hemoglobin Concentration : 32.6 gm/dL  Auto Neutrophil # : x  Auto Lymphocyte # : x  Auto Monocyte # : x  Auto Eosinophil # : x  Auto Basophil # : x  Auto Neutrophil % : x  Auto Lymphocyte % : x  Auto Monocyte % : x  Auto Eosinophil % : x  Auto Basophil % : x    07 Mar 2024 06:10    140    |  104    |  15     ----------------------------<  245    3.7     |  29     |  0.59     Ca    7.9        07 Mar 2024 06:10        Urinalysis Basic - ( 07 Mar 2024 06:10 )    Color: x / Appearance: x / SG: x / pH: x  Gluc: 245 mg/dL / Ketone: x  / Bili: x / Urobili: x   Blood: x / Protein: x / Nitrite: x   Leuk Esterase: x / RBC: x / WBC x   Sq Epi: x / Non Sq Epi: x / Bacteria: x      CAPILLARY BLOOD GLUCOSE      POCT Blood Glucose.: 296 mg/dL (06 Mar 2024 20:31)  POCT Blood Glucose.: 157 mg/dL (06 Mar 2024 17:05)        Culture - Urine (collected 03-04-24 @ 21:45)  Source: Clean Catch Clean Catch (Midstream)  Final Report (03-06-24 @ 12:58):    Normal Urogenital baldemar present    Culture - Blood (collected 03-04-24 @ 18:45)  Source: .Blood Blood-Peripheral  Preliminary Report (03-07-24 @ 01:02):    No growth at 48 Hours    Culture - Blood (collected 03-04-24 @ 18:30)  Source: .Blood Blood-Peripheral  Preliminary Report (03-07-24 @ 01:02):    No growth at 48 Hours        RADIOLOGY & ADDITIONAL TESTS:    Personally reviewed.     Consultant(s) Notes Reviewed:  [x] YES  [ ] NO

## 2024-03-07 NOTE — PHYSICAL EXAM
[Chaperone Present] : A chaperone was present in the examining room during all aspects of the physical examination [No Acute Distress] : in no acute distress [Well developed] : well developed [Well Nourished] : ~L well nourished [Normal Memory] : ~T memory was ~L unimpaired [Oriented x3] : oriented to person, place, and time [Respirations regular] : ~T respiratory rate was regular [Normal Mood/Affect] : mood and affect are normal [No Edema] : ~T edema was not present [Obese] : obese [Normal Appearance] : general appearance was normal [Vulvar Atrophy] : vulvar atrophy [Atrophy] : atrophy [Cystocele] : a cystocele [Rectocele] : a rectocele [Uterine Prolapse] : uterine prolapse [Tenderness] : ~T no ~M abdominal tenderness observed [Distended] : not distended [None] : no CVA tenderness [Normal] : normal [de-identified] : Unable to fully assess extent of prolapse as patient it unable to properly Valsalva. Leading edge at least +1

## 2024-03-07 NOTE — DISCHARGE NOTE NURSING/CASE MANAGEMENT/SOCIAL WORK - NSDCPEFALRISK_GEN_ALL_CORE
For information on Fall & Injury Prevention, visit: [email or web address] OR  [email or web address] OR  https://www.cdc.gov/steadi/patient.html

## 2024-03-07 NOTE — DISCHARGE NOTE NURSING/CASE MANAGEMENT/SOCIAL WORK - PATIENT PORTAL LINK FT
You can access the FollowMyHealth Patient Portal offered by [facility] by registering at the following website: [email or web address]. By joining [facility]’s FollowMyHealth portal, you will also be able to view your health information using other applications (apps) compatible with our system.

## 2024-03-07 NOTE — HISTORY OF PRESENT ILLNESS
[FreeTextEntry1] : Karen Gomez is an 89-year-old P5 presenting for evaluation of prolapse. She reports significant discomfort from vaginal bulge. Pt is accompanied by her daughter who reports that she had gone for a pessary fitting in the past but was very uncomfortable and they were unable to insert device.  Pt also reports urinary urgency, frequency, and particularly nocturia, as well as urgency urinary incontinence. Currently experience pain and burning with urination. No h/o gross hematuria. She denies any fevers or back pain.  She has a history of chronic constipation. Of note, pt saw GI earlier today for this as well as intermittent nausea/vomiting over the last several months.    PMH: DM, glaucoma, HTN, CAD s/p stent, back problems PSH: spinal surgery (s/p fall), cardiac stent OBGYN Hx:  x 5 Social Hx: non-smoker

## 2024-03-07 NOTE — DISCHARGE NOTE NURSING/CASE MANAGEMENT/SOCIAL WORK - NSSCNAMETXT_GEN_ALL_CORE
[facility] [phone] or  [phone]  Nurse to visit the day after hospital discharge; physical therapist to follow. Please contact the home care agency at the above phone number if you have not heard from them by 12 noon on the day after your hospital discharge.

## 2024-03-07 NOTE — PROGRESS NOTE ADULT - PROBLEM SELECTOR PLAN 4
chronic; BP on admission: 106/61  continue home medications  - lisinopril 25mg QD  - amlodipine 5mg QD
chronic; BP on admission: 106/61  continue home medications  - lisinopril 25mg QD  - amlodipine 5mg QD

## 2024-03-07 NOTE — PROGRESS NOTE ADULT - PROBLEM SELECTOR PLAN 5
on Home janumet  BID  will hold oral agents   moderate sliding scale, no basal or premeal insulin  a1c 7.7 as of 3/6  hypoglycemia protocol  acu checks per protocol
on Home janumet  BID  will hold oral agents   moderate sliding scale, no basal or premeal insulin  a1c 7.7 as of 3/6  hypoglycemia protocol  acu checks per protocol

## 2024-03-07 NOTE — REASON FOR VISIT
[Initial Visit ___] : an initial visit for [unfilled] [Family Member] : family member [Questionnaire Received] : Patient questionnaire received [Intake Form Reviewed] : Patient intake form with past medical history, surgical history, family history and social history reviewed today

## 2024-03-07 NOTE — PROGRESS NOTE ADULT - PROBLEM SELECTOR PLAN 3
chronic  s/p stent  2019  continue home medications  - aspirin 81mg QD  - Metoprolol tartrate 75mg QD  - atorvastatin 40mg QD  Lipid panel + low HDL
chronic  s/p stent  2019  continue home medications  - aspirin 81mg QD  - Metoprolol tartrate 75mg QD  - atorvastatin 40mg QD  Lipid panel + low HDL

## 2024-03-07 NOTE — PROGRESS NOTE ADULT - ATTENDING COMMENTS
Patient seen and examined at bedside. States she is feeling much better today, dysuria significantly improved. Patient eager to go home. Completed 3 day course of IV abx for suspected UTI. Please refer to updated d/c note for further details.

## 2024-03-07 NOTE — CASE MANAGEMENT PROGRESS NOTE - NSCMPROGRESSNOTE_GEN_ALL_CORE
Accepted [facility] care agency [phone] will reach out to you within 24-72 hours . Son at bedside to transport . Has RW and cane w intermittent assist in home hhired pvtly . DC needs in place

## 2024-03-07 NOTE — PROGRESS NOTE ADULT - ASSESSMENT
89yF, PMH of DM, gout, CAD (s/p1 stent in oct  2019), htn, anxiety/depression presenting with one day duration of weakness, abdominal pain, pain with urination with N/V.    eval pulm edema  eval ILD  GOUT  DM  OP  OA  CAD  HTN  Anxiety  Abd pain    LE doppler NEG for DVT  on RA  vs noted    cardio eval  cvs rx regimen  TTE  I and O  BP control  CT chest neg for pe - reticulation - eval for ILD  PFT as outpatient  eval for Pulm Edema  proBNP noted  monitor VS and HD and Sat  goal sat > 88 pct  GOC discussion  old records reviewed  no acute emerging pathology on CT chest
89yF, PMH of DM, gout, CAD (s/p1 stent in oct  2019), htn, anxiety/depression admitted for weakness likely 2/2 to UTI.
89yF, PMH of DM, gout, CAD (s/p1 stent in oct  2019), htn, anxiety/depression admitted for weakness likely 2/2 to UTI.

## 2024-03-07 NOTE — DISCUSSION/SUMMARY
[FreeTextEntry1] : #Prolapse - Pt noted to have incomplete bladder emptying. - We discussed management of prolapse.  - Given patient's age and medical comorbidities, discussed that she would likely not be a good candidate for surgical intervention, and I would recommend pessary placement.  - She will RTO for IPE.   #Dysuria #Pyuria - Discussed likely urinary tract infection; will follow-up formal UA and UCx. - Recommend empiric antibiotics, however due to pt complaint of intermittent N/V, patient and daughter prefer to bring patient to the hospital for IV antibiotics. Pt daughter to bring patient.

## 2024-03-07 NOTE — PROGRESS NOTE ADULT - PROBLEM SELECTOR PLAN 1
UA: + for wbc, bacteria; 10,000-49,000 gram positive  CT A/P: Thickened urinary bladder wall with mild inflammatory changes in the adjacent fat. Question cystitis.  Course of IV Rocephin completed  Blood cx negative  Urine cx negative  lactate 2.8 -> .8 s/p 1L bolus, continue IVF, LR at 50ml/hr  NV from infection vs diabetic gastroparesis, zofran 4mg q8; qtc 497
UA: + for wbc, bacteria; 10,000-49,000 gram positive  CT A/P: Thickened urinary bladder wall with mild inflammatory changes in the adjacent fat. Question cystitis.  s/p rocephin 1g in ED, will continue rocephin 1g q24  Blood cx negative  lactate 2.8 -> .8 s/p 1L bolus, continue IVF, LR at 50ml/hr  NV from infection vs diabetic gastroparesis, zofran 4mg q8; qtc 497

## 2024-03-07 NOTE — PROCEDURE
[FreeTextEntry1] : A sterile straight catheterization was performed to rule out a urinary tract infection and measure postvoid residual volume, which was 170 ml, cloudy urine.

## 2024-03-07 NOTE — PROGRESS NOTE ADULT - PROBLEM SELECTOR PLAN 2
CT: Peripheral reticular lung opacities throughout the lungs suggestive of a fibrotic process and/or pulmonary edema.  BNP: 774  No elevated liver enzymes, low suspicion for cor-pulmonale  Doppler negative for DVT  TTE EF 50%  pulm consulted: sal; apprec recs
CT: Peripheral reticular lung opacities throughout the lungs suggestive of a fibrotic process and/or pulmonary edema.  BNP: 774  No elevated liver enzymes, low suspicion for cor-pulmonale  Doppler negative for DVT  TTE for AM  pulm consulted: sal; dollyrec recs

## 2024-03-07 NOTE — PROGRESS NOTE ADULT - PROBLEM SELECTOR PLAN 7
chronic  continue home medications  - lexapro 10mg QD  - buproprion 300mg QD
chronic  continue home medications  - lexapro 10mg QD  - buproprion 300mg QD
Self

## 2024-03-08 ENCOUNTER — NON-APPOINTMENT (OUTPATIENT)
Age: 89
End: 2024-03-08

## 2024-03-08 LAB
APPEARANCE: ABNORMAL
BACTERIA UR CULT: NORMAL
BACTERIA: ABNORMAL /HPF
BILIRUBIN URINE: NEGATIVE
BLOOD URINE: ABNORMAL
CAST: 0 /LPF
COLOR: YELLOW
EPITHELIAL CELLS: 29 /HPF
GLUCOSE QUALITATIVE U: NEGATIVE MG/DL
KETONES URINE: NEGATIVE MG/DL
LEUKOCYTE ESTERASE URINE: ABNORMAL
MICROSCOPIC-UA: NORMAL
NITRITE URINE: NEGATIVE
PH URINE: 5.5
PROTEIN URINE: 300 MG/DL
RED BLOOD CELLS URINE: 9 /HPF
REVIEW: NORMAL
SPECIFIC GRAVITY URINE: 1.02
UROBILINOGEN URINE: 0.2 MG/DL
WHITE BLOOD CELLS URINE: >998 /HPF

## 2024-03-10 LAB
CULTURE RESULTS: SIGNIFICANT CHANGE UP
CULTURE RESULTS: SIGNIFICANT CHANGE UP
SPECIMEN SOURCE: SIGNIFICANT CHANGE UP
SPECIMEN SOURCE: SIGNIFICANT CHANGE UP

## 2024-03-12 DIAGNOSIS — Z82.49 FAMILY HISTORY OF ISCHEMIC HEART DISEASE AND OTHER DISEASES OF THE CIRCULATORY SYSTEM: ICD-10-CM

## 2024-03-12 DIAGNOSIS — Z63.4 DISAPPEARANCE AND DEATH OF FAMILY MEMBER: ICD-10-CM

## 2024-03-12 DIAGNOSIS — Z86.69 PERSONAL HISTORY OF OTHER DISEASES OF THE NERVOUS SYSTEM AND SENSE ORGANS: ICD-10-CM

## 2024-03-12 DIAGNOSIS — I51.9 HEART DISEASE, UNSPECIFIED: ICD-10-CM

## 2024-03-12 DIAGNOSIS — Z86.39 PERSONAL HISTORY OF OTHER ENDOCRINE, NUTRITIONAL AND METABOLIC DISEASE: ICD-10-CM

## 2024-03-12 DIAGNOSIS — Z78.9 OTHER SPECIFIED HEALTH STATUS: ICD-10-CM

## 2024-03-12 DIAGNOSIS — Z87.19 PERSONAL HISTORY OF OTHER DISEASES OF THE DIGESTIVE SYSTEM: ICD-10-CM

## 2024-03-12 DIAGNOSIS — Z83.511 FAMILY HISTORY OF GLAUCOMA: ICD-10-CM

## 2024-03-12 DIAGNOSIS — Z80.3 FAMILY HISTORY OF MALIGNANT NEOPLASM OF BREAST: ICD-10-CM

## 2024-03-12 DIAGNOSIS — Z87.828 PERSONAL HISTORY OF OTHER (HEALED) PHYSICAL INJURY AND TRAUMA: ICD-10-CM

## 2024-03-12 DIAGNOSIS — Z86.79 PERSONAL HISTORY OF OTHER DISEASES OF THE CIRCULATORY SYSTEM: ICD-10-CM

## 2024-03-12 SDOH — SOCIAL STABILITY - SOCIAL INSECURITY: DISSAPEARANCE AND DEATH OF FAMILY MEMBER: Z63.4

## 2024-03-19 NOTE — DISCHARGE NOTE PROVIDER - NSDCCPCAREPLAN_GEN_ALL_CORE_FT
PRINCIPAL DISCHARGE DIAGNOSIS  Diagnosis: Urinary tract infection  Assessment and Plan of Treatment: You were admitted to the hospital for UTI.   You were treated with IV antibiotics (Rocephin).      SECONDARY DISCHARGE DIAGNOSES  Diagnosis: Hypomagnesemia  Assessment and Plan of Treatment:      PRINCIPAL DISCHARGE DIAGNOSIS  Diagnosis: Urinary tract infection  Assessment and Plan of Treatment: You completed course of IV antibiotics.Continue pyridium 200mg every 8 hours for 2 more days to help with dysuria (burning on urination). Follow up with PCP within 2 weeks of discharge.      SECONDARY DISCHARGE DIAGNOSES  Diagnosis: Diabetes  Assessment and Plan of Treatment: Continue home medications as prescribed.    Diagnosis: Abnormal CT scan, lung  Assessment and Plan of Treatment: CTA chest showed: No pulmonary embolism. Peripheral reticular lung opacities throughout the lungs suggestive of a fibrotic process and/or pulmonary edema. You were evaluated by a Pulmonologist, who recommends outpatient follow up for pulmonary function tests.     no

## 2024-03-20 ENCOUNTER — APPOINTMENT (OUTPATIENT)
Dept: UROGYNECOLOGY | Facility: CLINIC | Age: 89
End: 2024-03-20
Payer: MEDICARE

## 2024-03-20 DIAGNOSIS — R33.9 RETENTION OF URINE, UNSPECIFIED: ICD-10-CM

## 2024-03-20 PROCEDURE — 57160 INSERT PESSARY/OTHER DEVICE: CPT

## 2024-03-20 PROCEDURE — A4561: CPT

## 2024-03-21 NOTE — PHYSICAL THERAPY INITIAL EVALUATION ADULT - ASSISTIVE DEVICE FOR TRANSFER: GAIT, REHAB EVAL
I have reviewed this patient's TCU admission History & Physical for medication related changes/recommendations identified by the admitting provider.  I am confirming that there are no recommendations requiring changes to medication orders are indicated at this time based on the provider recommendations in the H&P.  Caridad Person, [facility]  PharmD,BCPS  March 21, 2024       rolling walker

## 2024-03-22 NOTE — REASON FOR VISIT
[de-identified] : 29 year old male presents with follow up for ear fullness. Reports left ear pressure/fullness is worse than right States pressure is constant however it has been severe for the past week  Reports mild left otalgia Reports clicking and popping sounds  States hearing is the same since the last visit.  Not using hearing aids.  Patient denies , otorrhea, ear infections, hearing loss, dizziness, vertigo, headaches related to hearing. 
[Follow-Up Visit_____] : a follow-up visit for [unfilled]

## 2024-03-24 NOTE — DISCUSSION/SUMMARY
[FreeTextEntry1] : #Prolapse - S/p fitting with #7 cube after multiple trials of Gellhorn and cube pessaries. - We reviewed potential adverse effects of pessary, including discomfort, bleeding, discharge, irritation. Aware that pessary may fall out while doing her normal activities after she leaves the office. - Precautions reviewed, aware to call the office with any questions or concerns. - Recommend A&D ointment or Aquaphor externally to prevent irritation. - RTO in 2 weeks or earlier PRN.

## 2024-03-24 NOTE — HISTORY OF PRESENT ILLNESS
[FreeTextEntry1] : Pt presents accompanied by her daughter for IPE.  S/p hospitalization for UTI. She denies any urinary complaints indicative of UTI today.

## 2024-03-24 NOTE — PROCEDURE
[Good Fit] : fits well [Pessary Inserted] : inserted [Erosion] : no evidence of erosion [Erythema] : no erythema [Discharge] : no vaginal discharge [Infection] : no evidence of infection [de-identified] : Failed trial of 2 1/2, 2 3/4, 3' Gellhorn, as well as #5 and #6 cubes. [FreeTextEntry1] : #7 cube

## 2024-03-24 NOTE — PHYSICAL EXAM
[Chaperone Present] : A chaperone was present in the examining room during all aspects of the physical examination [No Acute Distress] : in no acute distress [Well developed] : well developed [Well Nourished] : ~L well nourished [Oriented x3] : oriented to person, place, and time [Normal Memory] : ~T memory was ~L unimpaired [Normal Mood/Affect] : mood and affect are normal [Respirations regular] : ~T respiratory rate was regular [No Edema] : ~T edema was not present [Obese] : obese [Vulvar Atrophy] : vulvar atrophy [Normal] : was normal [Normal Appearance] : general appearance was normal [Atrophy] : atrophy [Rectocele] : a rectocele [Cystocele] : a cystocele [Uterine Prolapse] : uterine prolapse [Distended] : not distended [Tenderness] : ~T no ~M abdominal tenderness observed

## 2024-04-03 ENCOUNTER — OUTPATIENT (OUTPATIENT)
Dept: OUTPATIENT SERVICES | Facility: HOSPITAL | Age: 89
LOS: 1 days | End: 2024-04-03
Payer: MEDICARE

## 2024-04-03 DIAGNOSIS — R11.11 VOMITING WITHOUT NAUSEA: ICD-10-CM

## 2024-04-03 PROCEDURE — 74246 X-RAY XM UPR GI TRC 2CNTRST: CPT | Mod: 26

## 2024-04-03 PROCEDURE — 74246 X-RAY XM UPR GI TRC 2CNTRST: CPT

## 2024-04-04 ENCOUNTER — APPOINTMENT (OUTPATIENT)
Dept: UROGYNECOLOGY | Facility: CLINIC | Age: 89
End: 2024-04-04
Payer: MEDICARE

## 2024-04-04 VITALS
BODY MASS INDEX: 29.64 KG/M2 | WEIGHT: 147 LBS | HEIGHT: 59 IN | HEART RATE: 90 BPM | DIASTOLIC BLOOD PRESSURE: 72 MMHG | SYSTOLIC BLOOD PRESSURE: 125 MMHG

## 2024-04-04 PROCEDURE — 17250 CHEM CAUT OF GRANLTJ TISSUE: CPT

## 2024-04-04 PROCEDURE — 99214 OFFICE O/P EST MOD 30 MIN: CPT | Mod: 25

## 2024-04-05 RX ORDER — ESTRADIOL 0.1 MG/G
0.1 CREAM VAGINAL
Qty: 1 | Refills: 3 | Status: ACTIVE | COMMUNITY
Start: 2024-04-05 | End: 1900-01-01

## 2024-04-05 NOTE — DISCUSSION/SUMMARY
Date and time of surgery :    8/28/23 at 925          Arrival Time:  725     Bring Picture ID and insurance card. Please wear simple, loose fitting clothing to the hospital.   Rivka Peers not bring valuables (money, credit cards, checkbooks, etc.)   Do not wear any makeup (including  eye makeup) and no nail polish or artificial nails on your fingers or toes. DO NOT wear any jewelry or piercings on day of surgery. All body piercing jewelry must be removed. If you have dentures, they will be removed before going to the OR; we will provide you a container. If you wear contact lenses or glasses, they will be removed; please bring a case for them. Shower the evening before or morning of surgery with antibacterial soap. Nothing to eat or drink after midnight the day before surgery. You may brush your teeth and gargle the morning of surgery. DO NOT SWALLOW WATER. Take only Prednisone the morning of your surgery with a sip of water  Aspirin, Ibuprofen, Advil, Naproxen, Vitamin E and other Anti-inflammatory products and supplements should be stopped for 5 -7days before surgery or as directed by your physician. Do not smoke or drink any alcoholic beverages 24 hours prior to surgery. This includes NA Beer. Refrain from the usage of any recreational drugs, including non-prescribed prescription drugs. You MUST plan for a responsible adult to stay on site while you are here and take you home after your surgery. You will not be allowed to leave alone or drive yourself home. It is strongly suggested someone stay with you the first 24 hrs. Your surgery will be cancelled if you do not have a ride home. To help prevent infection, change your sheets the night before surgery. If you  have a Living Will and Durable Power of  for Healthcare, please bring in a copy. Notify your Surgeon if you develop any illness between now and time of surgery.  Cough, cold, fever, sore throat, nausea, vomiting, etc.  Please notify your [FreeTextEntry1] : #Prolapse: #7 cube pessary removed today and left out for pelvic rest 2/2 erosions of the vaginal epithelium. Difficulty removing due to significant contraction of the vaginal epithelium. Instructed to start vaginal estrogen therapy nightly and will return to refitting with smaller size cube. May consider donut pessary as well.

## 2024-04-05 NOTE — HISTORY OF PRESENT ILLNESS
[FreeTextEntry1] : Pt presents for pessary check after initial fitting 2 weeks ago. Reports doing well with pessary. Voiding well and has better control of the bladder. She is happy with management.

## 2024-04-05 NOTE — PROCEDURE
[Good Fit] : fit is not good [Refit] : refit is not needed [Pessary Out] : removed [Moderate] : moderate bleeding [Hemostatic Agent used (Silver Nitrate)] : a hemostatic agent (Silver Nitrate) was used to resolve bleeding [Medication Review] : Medicaiton Review: Patient verbalizes understanding of risks and benefits [FreeTextEntry1] : #7 cube [de-identified] : too large [FreeTextEntry3] : Will start vaginal estrogen therapy

## 2024-04-05 NOTE — PHYSICAL EXAM
[Chaperone Present] : A chaperone was present in the examining room during all aspects of the physical examination [FreeTextEntry1] : Rm ISAAC [No Acute Distress] : in no acute distress [Well developed] : well developed [Well Nourished] : ~L well nourished [Oriented x3] : oriented to person, place, and time [Normal Memory] : ~T memory was ~L unimpaired [Normal Mood/Affect] : mood and affect are normal [Respirations regular] : ~T respiratory rate was regular [No Edema] : ~T edema was not present [Tenderness] : ~T no ~M abdominal tenderness observed [Distended] : not distended [Obese] : obese [Vulvar Atrophy] : vulvar atrophy [Normal] : was normal [Atrophy] : atrophy [FreeTextEntry4] : erosion of the posterior vaginal epithelium causing mild bleeding.

## 2024-04-11 ENCOUNTER — APPOINTMENT (OUTPATIENT)
Dept: UROGYNECOLOGY | Facility: CLINIC | Age: 89
End: 2024-04-11
Payer: MEDICARE

## 2024-04-11 PROCEDURE — A4561: CPT

## 2024-04-11 PROCEDURE — 99213 OFFICE O/P EST LOW 20 MIN: CPT

## 2024-04-11 NOTE — PHYSICAL EXAM
[Chaperone Present] : A chaperone was present in the examining room during all aspects of the physical examination [No Acute Distress] : in no acute distress [Well developed] : well developed [Well Nourished] : ~L well nourished [Oriented x3] : oriented to person, place, and time [Normal Memory] : ~T memory was ~L unimpaired [Normal Mood/Affect] : mood and affect are normal [Respirations regular] : ~T respiratory rate was regular [No Edema] : ~T edema was not present [Tenderness] : ~T no ~M abdominal tenderness observed [Distended] : not distended [Obese] : obese [Vulvar Atrophy] : vulvar atrophy [Normal] : was normal [Atrophy] : atrophy [FreeTextEntry4] : erosion of the posterior vaginal epithelium well healed, no bleeding.

## 2024-04-11 NOTE — PROCEDURE
[Good Fit] : fits well [Refit] : refit needed [Pessary Inserted] : inserted [Medication Review] : Medicaiton Review: Patient verbalizes understanding of risks and benefits [FreeTextEntry1] : #6 cube [FreeTextEntry3] : Continue vaginal estrogen therapy.

## 2024-04-11 NOTE — DISCUSSION/SUMMARY
[FreeTextEntry1] : #Prolapse:  - #6 cube pessary inserted today (downsized from #7). - Continue vaginal estrogen therapy - RTO in 3-4 weeks; aware to all with any questions or concerns.

## 2024-04-11 NOTE — HISTORY OF PRESENT ILLNESS
[FreeTextEntry1] : Pt presents for follow-up. At last visit cube #7 pessary was removed due to significant difficulty with removal and erosions. She presents today for pessary reinsertion.

## 2024-05-06 NOTE — ED ADULT NURSE NOTE - SKIN TEMPERATURE MOISTURE
[facility] Care Coordination Contact    Called member to schedule annual HRA home visit. HRA has been scheduled for 5/14/24 at 12pm.      Myriam Lomeli RN, PHN  [facility]  [phone]         warm

## 2024-05-15 ENCOUNTER — APPOINTMENT (OUTPATIENT)
Dept: UROGYNECOLOGY | Facility: CLINIC | Age: 89
End: 2024-05-15
Payer: MEDICARE

## 2024-05-15 VITALS
OXYGEN SATURATION: 94 % | HEART RATE: 84 BPM | BODY MASS INDEX: 30.04 KG/M2 | WEIGHT: 149 LBS | HEIGHT: 59 IN | DIASTOLIC BLOOD PRESSURE: 62 MMHG | SYSTOLIC BLOOD PRESSURE: 112 MMHG

## 2024-05-15 DIAGNOSIS — N89.8 OTHER SPECIFIED COMPLICATION OF GENITOURINARY PROSTHETIC DEVICES, IMPLANTS AND GRAFTS, INITIAL ENCOUNTER: ICD-10-CM

## 2024-05-15 DIAGNOSIS — T83.89XA OTHER SPECIFIED COMPLICATION OF GENITOURINARY PROSTHETIC DEVICES, IMPLANTS AND GRAFTS, INITIAL ENCOUNTER: ICD-10-CM

## 2024-05-15 DIAGNOSIS — N81.6 RECTOCELE: ICD-10-CM

## 2024-05-15 PROCEDURE — 17250 CHEM CAUT OF GRANLTJ TISSUE: CPT

## 2024-05-15 PROCEDURE — 99213 OFFICE O/P EST LOW 20 MIN: CPT | Mod: 25

## 2024-05-15 RX ORDER — ESTRADIOL 2 MG/1
7.5 SYSTEM VAGINAL
Qty: 1 | Refills: 3 | Status: ACTIVE | COMMUNITY
Start: 2024-05-15 | End: 1900-01-01

## 2024-05-15 NOTE — PROCEDURE
[Good Fit] : fits well [Refit] : refit needed [Erosion] : evidence of erosion [Erythema] : no erythema [Discharge] : no vaginal discharge [Infection] : no evidence of infection [Pessary Inserted] : inserted [Pessary Washed] : washed [Mild] : mild bleeding [Hemostatic Agent used (Silver Nitrate)] : a hemostatic agent (Silver Nitrate) was used to resolve bleeding [Medication Review] : Medicaiton Review: Patient verbalizes understanding of risks and benefits [FreeTextEntry1] : #6 cube [de-identified] : posterior vaginal epithelium with some granulation tissue [de-identified] : Silver nitrate applied to erosion/granulation tissue [FreeTextEntry3] : Continue vaginal estrogen therapy.

## 2024-05-15 NOTE — HISTORY OF PRESENT ILLNESS
[FreeTextEntry1] : Pt presents for follow-up. Patient reports doing well today, denies any issues or complaints from the pessary. Voiding well. No discharge or bleeding. She has been using the vaginal estrogen cream nightly.

## 2024-05-15 NOTE — PHYSICAL EXAM
[Chaperone Present] : A chaperone was present in the examining room during all aspects of the physical examination [No Acute Distress] : in no acute distress [Well developed] : well developed [Well Nourished] : ~L well nourished [Oriented x3] : oriented to person, place, and time [Normal Memory] : ~T memory was ~L unimpaired [Normal Mood/Affect] : mood and affect are normal [Respirations regular] : ~T respiratory rate was regular [No Edema] : ~T edema was not present [Tenderness] : ~T no ~M abdominal tenderness observed [Distended] : not distended [Obese] : obese [Vulvar Atrophy] : vulvar atrophy [Normal] : was normal [Atrophy] : atrophy [FreeTextEntry4] : small erosion of the posterior vaginal epithelium

## 2024-05-15 NOTE — DISCUSSION/SUMMARY
[FreeTextEntry1] : #Prolapse:  - Continue with #6 cube pessary; may benefit from #5 next visit (out of stock and on backorder today) - Continue vaginal estrogen therapy, will try Estring, Rx sent to pharmacy. - RTO in 4 weeks; aware to all with any questions or concerns.

## 2024-05-23 ENCOUNTER — NON-APPOINTMENT (OUTPATIENT)
Age: 89
End: 2024-05-23

## 2024-06-19 ENCOUNTER — APPOINTMENT (OUTPATIENT)
Dept: UROGYNECOLOGY | Facility: CLINIC | Age: 89
End: 2024-06-19
Payer: MEDICARE

## 2024-06-19 VITALS
HEART RATE: 77 BPM | DIASTOLIC BLOOD PRESSURE: 71 MMHG | WEIGHT: 145 LBS | HEIGHT: 59 IN | SYSTOLIC BLOOD PRESSURE: 112 MMHG | BODY MASS INDEX: 29.23 KG/M2

## 2024-06-19 DIAGNOSIS — N81.2 INCOMPLETE UTEROVAGINAL PROLAPSE: ICD-10-CM

## 2024-06-19 DIAGNOSIS — N81.11 CYSTOCELE, MIDLINE: ICD-10-CM

## 2024-06-19 PROCEDURE — 99213 OFFICE O/P EST LOW 20 MIN: CPT

## 2024-06-19 NOTE — PHYSICAL EXAM
[Chaperone Present] : A chaperone was present in the examining room during all aspects of the physical examination [No Acute Distress] : in no acute distress [Well developed] : well developed [Well Nourished] : ~L well nourished [Oriented x3] : oriented to person, place, and time [Normal Memory] : ~T memory was ~L unimpaired [Normal Mood/Affect] : mood and affect are normal [Respirations regular] : ~T respiratory rate was regular [No Edema] : ~T edema was not present [Obese] : obese [Vulvar Atrophy] : vulvar atrophy [Normal] : was normal [Atrophy] : atrophy [Tenderness] : ~T no ~M abdominal tenderness observed [Distended] : not distended [FreeTextEntry4] : small erosion of the posterior vaginal epithelium

## 2024-06-19 NOTE — DISCUSSION/SUMMARY
[FreeTextEntry1] : #Prolapse:  - Continue with #6 cube pessary; may benefit from #5 next visit (out of stock and on backorder today) - Estring placed today, to be replaced in 3 months. - RTO in 6 weeks; aware to call with any questions or concerns.

## 2024-06-19 NOTE — PROCEDURE
[Good Fit] : fits well [Refit] : refit needed [Erosion] : evidence of erosion [Pessary Inserted] : inserted [Pessary Washed] : washed [Mild] : mild bleeding [Hemostatic Agent used (Silver Nitrate)] : a hemostatic agent (Silver Nitrate) was used to resolve bleeding [Medication Review] : Medicaiton Review: Patient verbalizes understanding of risks and benefits [New] : new [Erythema] : no erythema [Discharge] : no vaginal discharge [Infection] : no evidence of infection [FreeTextEntry1] : #6 cube [de-identified] : posterior vaginal epithelium with some granulation tissue [de-identified] : Silver nitrate applied to erosion/granulation tissue [FreeTextEntry3] : Estring in place

## 2024-08-08 ENCOUNTER — APPOINTMENT (OUTPATIENT)
Dept: UROGYNECOLOGY | Facility: CLINIC | Age: 89
End: 2024-08-08

## 2024-08-08 PROCEDURE — 99213 OFFICE O/P EST LOW 20 MIN: CPT

## 2024-08-08 NOTE — PROCEDURE
[New] : new [Good Fit] : fits well [Refit] : refit needed [Erosion] : evidence of erosion [Pessary Inserted] : inserted [Pessary Washed] : washed [Mild] : mild bleeding [Hemostatic Agent used (Silver Nitrate)] : a hemostatic agent (Silver Nitrate) was used to resolve bleeding [Medication Review] : Medicaiton Review: Patient verbalizes understanding of risks and benefits [Erythema] : no erythema [Discharge] : no vaginal discharge [Infection] : no evidence of infection [FreeTextEntry1] : #6 cube [de-identified] : posterior vaginal epithelium with some granulation tissue [de-identified] : Silver nitrate applied to erosion/granulation tissue [FreeTextEntry3] : Estring in place

## 2024-08-08 NOTE — PHYSICAL EXAM
[Chaperone Present] : A chaperone was present in the examining room during all aspects of the physical examination [No Acute Distress] : in no acute distress [Well developed] : well developed [Well Nourished] : ~L well nourished [Oriented x3] : oriented to person, place, and time [Normal Memory] : ~T memory was ~L unimpaired [Normal Mood/Affect] : mood and affect are normal [Respirations regular] : ~T respiratory rate was regular [No Edema] : ~T edema was not present [Obese] : obese [Vulvar Atrophy] : vulvar atrophy [Normal] : was normal [Atrophy] : atrophy [Tenderness] : ~T no ~M abdominal tenderness observed [Distended] : not distended [FreeTextEntry4] : + erosion of the posterior vaginal epithelium

## 2024-08-08 NOTE — DISCUSSION/SUMMARY
[FreeTextEntry1] : #Prolapse:  - Continue with #6 cube pessary; may benefit from #5 next visit (out of stock and on backorder today) - Estring in place, to be replaced at next visit.  - RTO in 5 weeks; aware to call with any questions or concerns.

## 2024-08-08 NOTE — HISTORY OF PRESENT ILLNESS
[FreeTextEntry1] : Pt presents for follow-up of cube pessary in place for POP. Pt also had an Estring placed last visit. Pt is doing well. Has some light pink staining occasionally. Had b/l back pain radiating to the groin last week that resolved with Tylenol. Denies dysuria or hematuria. She is voiding well without any issues/complaints.

## 2024-09-09 ENCOUNTER — NON-APPOINTMENT (OUTPATIENT)
Age: 89
End: 2024-09-09

## 2024-09-10 ENCOUNTER — APPOINTMENT (OUTPATIENT)
Dept: OTOLARYNGOLOGY | Facility: CLINIC | Age: 89
End: 2024-09-10
Payer: MEDICARE

## 2024-09-10 VITALS
HEIGHT: 59 IN | WEIGHT: 140 LBS | DIASTOLIC BLOOD PRESSURE: 71 MMHG | SYSTOLIC BLOOD PRESSURE: 116 MMHG | HEART RATE: 79 BPM | BODY MASS INDEX: 28.22 KG/M2

## 2024-09-10 DIAGNOSIS — H90.5 UNSPECIFIED SENSORINEURAL HEARING LOSS: ICD-10-CM

## 2024-09-10 DIAGNOSIS — H61.23 IMPACTED CERUMEN, BILATERAL: ICD-10-CM

## 2024-09-10 PROCEDURE — 69210 REMOVE IMPACTED EAR WAX UNI: CPT

## 2024-09-10 PROCEDURE — 99213 OFFICE O/P EST LOW 20 MIN: CPT | Mod: 25

## 2024-09-10 NOTE — HISTORY OF PRESENT ILLNESS
[de-identified] : 89 yr old female w MORIAH, not aided, was told she has wax + vertigo on and off for a few years +tinnitus

## 2024-09-10 NOTE — PHYSICAL EXAM
[Normal] : mucosa is normal [Midline] : trachea located in midline position [de-identified] : CI AU

## 2024-09-18 ENCOUNTER — APPOINTMENT (OUTPATIENT)
Dept: UROGYNECOLOGY | Facility: CLINIC | Age: 89
End: 2024-09-18
Payer: MEDICARE

## 2024-09-18 DIAGNOSIS — N89.8 OTHER SPECIFIED COMPLICATION OF GENITOURINARY PROSTHETIC DEVICES, IMPLANTS AND GRAFTS, INITIAL ENCOUNTER: ICD-10-CM

## 2024-09-18 DIAGNOSIS — N81.2 INCOMPLETE UTEROVAGINAL PROLAPSE: ICD-10-CM

## 2024-09-18 DIAGNOSIS — T83.89XA OTHER SPECIFIED COMPLICATION OF GENITOURINARY PROSTHETIC DEVICES, IMPLANTS AND GRAFTS, INITIAL ENCOUNTER: ICD-10-CM

## 2024-09-18 PROCEDURE — A4562: CPT

## 2024-09-18 PROCEDURE — 99459 PELVIC EXAMINATION: CPT

## 2024-09-18 PROCEDURE — 99213 OFFICE O/P EST LOW 20 MIN: CPT

## 2024-09-18 NOTE — DISCUSSION/SUMMARY
[FreeTextEntry1] : #Prolapse - Continue with pessary; #5 cube placed today (downsized from #6). - Estring replacement done today, next in 12/2024. - RTO in 5 weeks; aware to call with any questions or concerns.  #Nocturia - Advised to initiate behavioral/fluid modifications - If persistent at next visit, will discuss further evaluation and alternate treatment options

## 2024-09-18 NOTE — HISTORY OF PRESENT ILLNESS
[FreeTextEntry1] : Pt presents for follow-up of cube pessary in place for POP. Pt also had an Estring placed last visit. Pt is doing well. Has some light pink staining occasionally. Denies dysuria or hematuria. She is voiding well. Daughter reports that she has some constipation last week, resolved with Colace. Also has noticed some nocturia. Reports drinking water throughout the night when she wakes up to void.

## 2024-09-18 NOTE — PHYSICAL EXAM
[Chaperone Present] : A chaperone was present in the examining room during all aspects of the physical examination [74230] : A chaperone was present during the pelvic exam. [FreeTextEntry2] : Erin [No Acute Distress] : in no acute distress [Well developed] : well developed [Well Nourished] : ~L well nourished [Oriented x3] : oriented to person, place, and time [Normal Memory] : ~T memory was ~L unimpaired [Normal Mood/Affect] : mood and affect are normal [Respirations regular] : ~T respiratory rate was regular [No Edema] : ~T edema was not present [Tenderness] : ~T no ~M abdominal tenderness observed [Distended] : not distended [Obese] : obese [Vulvar Atrophy] : vulvar atrophy [Normal] : was normal [Atrophy] : atrophy [FreeTextEntry4] : + erosion of the posterior vaginal epithelium with granulation tissue

## 2024-09-18 NOTE — PROCEDURE
[Follow Up] : follow up [New] : new [Good Fit] : fits well [Refit] : refit needed [Erosion] : evidence of erosion [Erythema] : no erythema [Discharge] : no vaginal discharge [Infection] : no evidence of infection [Pessary Inserted] : inserted [Mild] : mild bleeding [Hemostatic Agent used (Silver Nitrate)] : a hemostatic agent (Silver Nitrate) was used to resolve bleeding [Medication Review] : Medicaiton Review: Patient verbalizes understanding of risks and benefits [FreeTextEntry2] : New Estring placed today, next in 12/2024 [FreeTextEntry1] : #5 cube [de-identified] : refitted to #5 cube from #6 [de-identified] : posterior vaginal epithelium with some granulation tissue, bleeding with removal of the pessary [de-identified] : Silver nitrate applied to erosion/granulation tissue [FreeTextEntry3] : New Estring placed today

## 2024-10-21 ENCOUNTER — APPOINTMENT (OUTPATIENT)
Dept: UROGYNECOLOGY | Facility: CLINIC | Age: 89
End: 2024-10-21
Payer: MEDICARE

## 2024-10-21 DIAGNOSIS — N81.2 INCOMPLETE UTEROVAGINAL PROLAPSE: ICD-10-CM

## 2024-10-21 PROCEDURE — 99213 OFFICE O/P EST LOW 20 MIN: CPT

## 2024-10-21 PROCEDURE — 99459 PELVIC EXAMINATION: CPT

## 2024-12-10 ENCOUNTER — APPOINTMENT (OUTPATIENT)
Dept: UROGYNECOLOGY | Facility: CLINIC | Age: 88
End: 2024-12-10
Payer: MEDICARE

## 2024-12-10 VITALS
WEIGHT: 140 LBS | BODY MASS INDEX: 28.22 KG/M2 | HEART RATE: 80 BPM | SYSTOLIC BLOOD PRESSURE: 102 MMHG | DIASTOLIC BLOOD PRESSURE: 62 MMHG | HEIGHT: 59 IN

## 2024-12-10 DIAGNOSIS — N89.8 OTHER SPECIFIED COMPLICATION OF GENITOURINARY PROSTHETIC DEVICES, IMPLANTS AND GRAFTS, INITIAL ENCOUNTER: ICD-10-CM

## 2024-12-10 DIAGNOSIS — T83.89XA OTHER SPECIFIED COMPLICATION OF GENITOURINARY PROSTHETIC DEVICES, IMPLANTS AND GRAFTS, INITIAL ENCOUNTER: ICD-10-CM

## 2024-12-10 PROCEDURE — 17250 CHEM CAUT OF GRANLTJ TISSUE: CPT

## 2024-12-10 PROCEDURE — A4561: CPT

## 2024-12-10 PROCEDURE — 99459 PELVIC EXAMINATION: CPT

## 2024-12-10 PROCEDURE — 99213 OFFICE O/P EST LOW 20 MIN: CPT | Mod: 25

## 2024-12-11 ENCOUNTER — APPOINTMENT (OUTPATIENT)
Dept: UROGYNECOLOGY | Facility: CLINIC | Age: 88
End: 2024-12-11
Payer: MEDICARE

## 2024-12-11 VITALS
SYSTOLIC BLOOD PRESSURE: 101 MMHG | BODY MASS INDEX: 28.22 KG/M2 | HEART RATE: 90 BPM | DIASTOLIC BLOOD PRESSURE: 63 MMHG | WEIGHT: 140 LBS | HEIGHT: 59 IN

## 2024-12-11 DIAGNOSIS — N81.11 CYSTOCELE, MIDLINE: ICD-10-CM

## 2024-12-11 DIAGNOSIS — N81.2 INCOMPLETE UTEROVAGINAL PROLAPSE: ICD-10-CM

## 2024-12-11 PROCEDURE — 99213 OFFICE O/P EST LOW 20 MIN: CPT

## 2024-12-11 PROCEDURE — 99459 PELVIC EXAMINATION: CPT

## 2025-01-27 ENCOUNTER — APPOINTMENT (OUTPATIENT)
Dept: UROGYNECOLOGY | Facility: CLINIC | Age: 89
End: 2025-01-27
Payer: MEDICARE

## 2025-01-27 DIAGNOSIS — Z46.89 ENCOUNTER FOR FITTING AND ADJUSTMENT OF OTHER SPECIFIED DEVICES: ICD-10-CM

## 2025-01-27 PROCEDURE — 17250 CHEM CAUT OF GRANLTJ TISSUE: CPT

## 2025-01-27 PROCEDURE — 99213 OFFICE O/P EST LOW 20 MIN: CPT | Mod: 25

## 2025-01-30 NOTE — ED ADULT NURSE NOTE - NS ED NOTE ABUSE SUSPICION NEGLECT YN
Due to length of time since pt was last seen, will require a new hearing test & follow up with Dr. Guerrier before proceeding with surgery.   No

## 2025-03-17 NOTE — CARE COORDINATION ASSESSMENT. - CURRENT STRESSORS OF PATIENT
Assessment and Plan:  Dahiana Blanco is a 36 y/o woman presenting today for annual GYN exam.     Diagnoses:  #1 Routine annual gynecologic exam  #2 Grief associated with loss of fetus  #3 Pelvic fullness    Assessment/Plan:  1. Annual GYN exam   - Pap smear sent.  - Follow-up with PCP and other healthcare specialists PRN.  - Briefly discussed increased age and fertility risk.    2. Grief after SAB 4-6 years ago  - Referral to behavioral health.    3. Possible fibroid felt on todays exam  - Ultrasound ordered.    Follow up with Dr. Rajput.    Vivianae Attestation  By signing my name below, I, MarycruzJustina Collins, attest that this documentation has been prepared under the direction and in the presence of Nicky Rajput MD on 3/17/2025 at 12:38 PM.     HPI:  Dahiana Blanco is a 36 y/o woman presenting today for annual GYN exam. She does not report any gynecologic issues today.    She was recently diagnosed with BV and prescribed Flagyl from an urgent care.     Past medical hx: Negative.    Family medical hx: Maternal grandmother has DM. Maternal great aunt has breast cancer. Father has HTN.    Surgical hx: LEEP procedure in 2016.     Social hx: No smoking or drug use. Social alcohol use.    GYN hx: M12y/o, Q monthly. Hx of GC and TV. Not currently SA and denies sexual abuse. Received Gardasil vaccination.      OB hx: . SAB at 8 weeks.    ROS:  Review of Systems    Physical Exam:   Physical Exam  Constitutional:       General: She is not in acute distress.     Appearance: Normal appearance.   Genitourinary:      Vulva exam comments: Normal appearing external female genitalia, normal Bartholin's and [place]'s glands bilaterally  .      Vaginal exam comments: Moist, rugated, well estrogenized, well supported.  .        Right Adnexa: not tender and no mass present.     Left Adnexa: not tender and no mass present.     No cervical motion tenderness or lesion.      Uterus is not fixed or tender.      Uterine mass (possible  right lower segment oblong fibroid.) present.  Breasts:     Right: No inverted nipple, mass, nipple discharge or skin change.      Left: No inverted nipple, mass, nipple discharge or skin change.   HENT:      Head: Normocephalic and atraumatic.      Right Ear: External ear normal.      Left Ear: External ear normal.      Nose: Nose normal.      Mouth/Throat:      Mouth: Mucous membranes are moist.      Pharynx: Oropharynx is clear.   Eyes:      Extraocular Movements: Extraocular movements intact.      Conjunctiva/sclera: Conjunctivae normal.      Pupils: Pupils are equal, round, and reactive to light.   Neck:      Thyroid: No thyromegaly.   Cardiovascular:      Rate and Rhythm: Normal rate and regular rhythm.      Pulses: Normal pulses.      Heart sounds: Normal heart sounds.   Pulmonary:      Effort: Pulmonary effort is normal.      Breath sounds: Normal breath sounds and air entry.   Abdominal:      Palpations: Abdomen is soft.      Tenderness: There is no abdominal tenderness.   Musculoskeletal:      Cervical back: Neck supple.   Lymphadenopathy:      Upper Body:      Right upper body: No axillary adenopathy.      Left upper body: No axillary adenopathy.   Neurological:      Mental Status: She is alert and oriented to person, place, and time.        hospitalization

## 2025-03-25 ENCOUNTER — APPOINTMENT (OUTPATIENT)
Dept: UROGYNECOLOGY | Facility: CLINIC | Age: 89
End: 2025-03-25
Payer: MEDICARE

## 2025-03-25 DIAGNOSIS — N81.2 INCOMPLETE UTEROVAGINAL PROLAPSE: ICD-10-CM

## 2025-03-25 DIAGNOSIS — Z46.89 ENCOUNTER FOR FITTING AND ADJUSTMENT OF OTHER SPECIFIED DEVICES: ICD-10-CM

## 2025-03-25 PROCEDURE — 99213 OFFICE O/P EST LOW 20 MIN: CPT

## 2025-04-03 ENCOUNTER — OUTPATIENT (OUTPATIENT)
Dept: OUTPATIENT SERVICES | Facility: HOSPITAL | Age: 89
LOS: 1 days | End: 2025-04-03
Payer: MEDICARE

## 2025-04-03 DIAGNOSIS — R11.2 NAUSEA WITH VOMITING, UNSPECIFIED: ICD-10-CM

## 2025-04-03 DIAGNOSIS — R63.4 ABNORMAL WEIGHT LOSS: ICD-10-CM

## 2025-04-03 PROCEDURE — 74220 X-RAY XM ESOPHAGUS 1CNTRST: CPT | Mod: 26

## 2025-04-03 PROCEDURE — 74220 X-RAY XM ESOPHAGUS 1CNTRST: CPT

## 2025-04-08 ENCOUNTER — APPOINTMENT (OUTPATIENT)
Dept: OTOLARYNGOLOGY | Facility: CLINIC | Age: 89
End: 2025-04-08

## 2025-04-15 ENCOUNTER — NON-APPOINTMENT (OUTPATIENT)
Age: 89
End: 2025-04-15

## 2025-04-15 ENCOUNTER — APPOINTMENT (OUTPATIENT)
Dept: GASTROENTEROLOGY | Facility: CLINIC | Age: 89
End: 2025-04-15
Payer: MEDICARE

## 2025-04-15 VITALS
HEART RATE: 90 BPM | OXYGEN SATURATION: 95 % | SYSTOLIC BLOOD PRESSURE: 140 MMHG | BODY MASS INDEX: 26 KG/M2 | DIASTOLIC BLOOD PRESSURE: 70 MMHG | WEIGHT: 129 LBS | HEIGHT: 59 IN

## 2025-04-15 DIAGNOSIS — R13.19 OTHER DYSPHAGIA: ICD-10-CM

## 2025-04-15 DIAGNOSIS — R13.12 DYSPHAGIA, OROPHARYNGEAL PHASE: ICD-10-CM

## 2025-04-15 DIAGNOSIS — R07.89 OTHER CHEST PAIN: ICD-10-CM

## 2025-04-15 DIAGNOSIS — Z87.19 PERSONAL HISTORY OF OTHER DISEASES OF THE DIGESTIVE SYSTEM: ICD-10-CM

## 2025-04-15 DIAGNOSIS — Z86.39 PERSONAL HISTORY OF OTHER ENDOCRINE, NUTRITIONAL AND METABOLIC DISEASE: ICD-10-CM

## 2025-04-15 DIAGNOSIS — R63.4 ABNORMAL WEIGHT LOSS: ICD-10-CM

## 2025-04-15 PROCEDURE — 99215 OFFICE O/P EST HI 40 MIN: CPT

## 2025-04-16 PROBLEM — Z86.39 HISTORY OF DIABETES MELLITUS: Status: RESOLVED | Noted: 2024-03-12 | Resolved: 2025-04-16

## 2025-04-16 PROBLEM — Z87.19 HISTORY OF CONSTIPATION: Status: RESOLVED | Noted: 2024-03-12 | Resolved: 2025-04-16

## 2025-04-16 PROBLEM — R63.4 ABNORMAL WEIGHT LOSS: Status: ACTIVE | Noted: 2025-04-16

## 2025-04-16 PROBLEM — R13.19 ESOPHAGEAL DYSPHAGIA: Status: ACTIVE | Noted: 2025-04-16

## 2025-04-16 PROBLEM — R07.89 ATYPICAL CHEST PAIN: Status: ACTIVE | Noted: 2025-04-16

## 2025-04-24 NOTE — ED PROVIDER NOTE - CPE EDP SKIN NORM
04/24/2025  Foot and Ankle Surgery - New Patient   Provider: Dr. Soy Root DPM  Location: [facility]    Subjective:  Katelin Farley is a 64 y.o. female.     Chief Complaint   Patient presents with    Right Ankle - Pain, Initial Evaluation     DOI- 3/29/2025    Initial Evaluation     PCP: Skip Terrell MD  Last PCP Visit: 4/8/25         History of Present Illness  The patient presents for evaluation of right ankle pain.    She sustained an injury to her right ankle on 03/29/2025, which was characterized by significant bruising and swelling. The incident occurred when she abruptly stood up from a seated position with her ankle tucked under her leg, resulting in a rolled ankle due to numbness. She has been experiencing persistent soreness in the ankle, although the intensity has decreased over time. She has been using a boot provided by an urgent care center but reports discomfort due to friction against her leg, causing additional bruising. She has not sought medical attention for this issue previously. Her mobility is compromised, necessitating the use of a handrail for support while descending stairs as she is unable to bear full weight on the affected foot. She has been performing circular movements with her foot throughout the day as a form of self-therapy.    Additionally, she reports chronic issues with her big toe joint, which have been exacerbated by the altered gait resulting from the ankle injury. She suspects the presence of arthritis in the joint, as it tends to worsen during the winter months. She recalls a previous incident several years ago where a heavy object was dropped on the same toe.       Allergies   Allergen Reactions    Adhesive Tape Rash     Derma dressing after biopsy caused blisters       History reviewed. No pertinent past medical history.    History reviewed. No pertinent surgical history.    History reviewed. No pertinent family history.    Social History  "    Socioeconomic History    Marital status:    Tobacco Use    Smoking status: Never     Passive exposure: Never    Smokeless tobacco: Never   Vaping Use    Vaping status: Never Used   Substance and Sexual Activity    Alcohol use: Yes     Comment: OCCAS.    Drug use: Never    Sexual activity: Yes        Current Outpatient Medications on File Prior to Visit   Medication Sig Dispense Refill    albuterol sulfate  (90 Base) MCG/ACT inhaler albuterol sulfate HFA 90 mcg/actuation aerosol inhaler   Inhale 2 puffs every 4 hours by inhalation route as needed.      betamethasone valerate (VALISONE) 0.1 % ointment Apply 1 Application topically to the appropriate area as directed Daily.      ipratropium-albuterol (DUO-NEB) 0.5-2.5 mg/3 ml nebulizer ipratropium 0.5 mg-albuterol 3 mg (2.5 mg base)/3 mL nebulization soln      PARoxetine (PAXIL) 10 MG tablet paroxetine 10 mg tablet      pramipexole (MIRAPEX) 0.5 MG tablet Take 3 tablets by mouth 3 (Three) Times a Day.      rizatriptan (MAXALT) 10 MG tablet rizatriptan 10 mg tablet       No current facility-administered medications on file prior to visit.         Objective   Pulse 70   Resp 18   Ht 157.5 cm (62\")   Wt 67.6 kg (149 lb)   SpO2 98%   BMI 27.25 kg/m²     Foot/Ankle Exam    GENERAL  Appearance:  appears stated age  Orientation:  AAOx3  Affect:  appropriate    VASCULAR     Right Foot Vascularity   Dorsalis pedis:  2+  Posterior tibial:  2+  Skin temperature:  warm  Edema gradin+  CFT:  < 3 seconds  Pedal hair growth:  Absent     NEUROLOGIC     Right Foot Neurologic   Light touch sensation: normal  Hot/Cold sensation: normal  Achilles reflex:  2+    MUSCULOSKELETAL     Right Foot Musculoskeletal   Ecchymosis:  none  Tenderness:  ankle joint tenderness, lateral malleolus tenderness and peroneal tendon tenderness    Arch:  Normal  Hallux limitus: Yes      DERMATOLOGIC      Right Foot Dermatologic   Skin  Right foot skin is intact.      Right foot " additional comments: Range of motion, muscle strength testing deferred secondary to guarding.  Mild bony spur noted to the dorsal aspect of the first metatarsal phalangeal joint with hallux limitus.  Moderate swelling involving the ankle with instability noted with anterior drawer and talar tilt testing.    Physical Exam  There is a lot of inflammation and instability in the ankle. There is some stiffness in the joint.       Results  Imaging  X-ray of the foot showed no obvious fracture.       Assessment & Plan   Diagnoses and all orders for this visit:    1. Acute right ankle pain (Primary)    2. Severe ankle sprain, right, initial encounter    3. Ankle instability, right    4. Hallux limitus, right    Other orders  -     methylPREDNISolone (MEDROL) 4 MG dose pack; Take as directed  Dispense: 21 tablet; Refill: 0       Assessment & Plan    The patient presents with a right ankle sprain sustained on 03/29/2025. Given the significant bruising and swelling observed, a grade 3 sprain is suspected. The patient reports persistent soreness and difficulty bearing weight, particularly when descending stairs. The patient was advised to avoid uneven terrain and increased activity to prevent further stress on the ankle. The use of a lace-up ankle brace was recommended to provide support and stability. Physical therapy was suggested to expedite recovery. The patient was instructed to perform up-and-down and circular movements throughout the day to improve flexibility. A Medrol Dosepak was prescribed to suppress inflammation. An MRI will be considered if symptoms persist after starting Medicare on 05/01/2025. If instability and discomfort continue, surgical intervention may be necessary.    The patient has longstanding discomfort involving the first metatarsal phalangeal joint.  The use of running shoes with ample mesh and shock absorption was recommended. Over-the-counter arch support inserts were also suggested to provide  additional support. An x-ray of the foot and ankle will be conducted during the next visit to assess the extent of the deformity and any associated arthritis.Reviewed proper basic stretching and manual therapy exercises along with appropriate shoes and activity.  Discussed proper use and/or avoidance of OTC anti-inflammatories.  Patient is to call with any additional issues or concerns.  Greater than 45 minutes was spent before, during, and after evaluation for patient care.    The encounter note is created with the use of AI technology.  I do apologize if there are typos and/or confusion within the note.  Please feel free to contact me or my office with any questions or concerns.    Follow-up  The patient will follow up in 2 weeks with weightbearing imaging of the foot and ankle.           Patient or patient representative verbalized consent for the use of Ambient Listening during the visit with  TOBY Root DPM for chart documentation. 4/24/2025  14:29 EDT    TOBY Root DPM   normal...

## 2025-04-28 ENCOUNTER — OUTPATIENT (OUTPATIENT)
Dept: OUTPATIENT SERVICES | Facility: HOSPITAL | Age: 89
LOS: 1 days | End: 2025-04-28
Payer: MEDICARE

## 2025-04-28 VITALS
DIASTOLIC BLOOD PRESSURE: 71 MMHG | TEMPERATURE: 99 F | RESPIRATION RATE: 18 BRPM | SYSTOLIC BLOOD PRESSURE: 156 MMHG | HEIGHT: 59.06 IN | HEART RATE: 76 BPM | WEIGHT: 128.09 LBS | OXYGEN SATURATION: 98 %

## 2025-04-28 DIAGNOSIS — E11.9 TYPE 2 DIABETES MELLITUS WITHOUT COMPLICATIONS: ICD-10-CM

## 2025-04-28 DIAGNOSIS — R13.12 DYSPHAGIA, OROPHARYNGEAL PHASE: ICD-10-CM

## 2025-04-28 DIAGNOSIS — F41.9 ANXIETY DISORDER, UNSPECIFIED: ICD-10-CM

## 2025-04-28 DIAGNOSIS — R13.10 DYSPHAGIA, UNSPECIFIED: ICD-10-CM

## 2025-04-28 DIAGNOSIS — I10 ESSENTIAL (PRIMARY) HYPERTENSION: ICD-10-CM

## 2025-04-28 DIAGNOSIS — Z98.890 OTHER SPECIFIED POSTPROCEDURAL STATES: Chronic | ICD-10-CM

## 2025-04-28 DIAGNOSIS — Z01.818 ENCOUNTER FOR OTHER PREPROCEDURAL EXAMINATION: ICD-10-CM

## 2025-04-28 DIAGNOSIS — Z95.5 PRESENCE OF CORONARY ANGIOPLASTY IMPLANT AND GRAFT: ICD-10-CM

## 2025-04-28 LAB
ALBUMIN SERPL ELPH-MCNC: 3.6 G/DL — SIGNIFICANT CHANGE UP (ref 3.3–5)
ALP SERPL-CCNC: 49 U/L — SIGNIFICANT CHANGE UP (ref 40–120)
ALT FLD-CCNC: 8 U/L — LOW (ref 10–45)
ANION GAP SERPL CALC-SCNC: 14 MMOL/L — SIGNIFICANT CHANGE UP (ref 5–17)
AST SERPL-CCNC: 14 U/L — SIGNIFICANT CHANGE UP (ref 10–40)
BILIRUB SERPL-MCNC: 0.5 MG/DL — SIGNIFICANT CHANGE UP (ref 0.2–1.2)
BUN SERPL-MCNC: 22 MG/DL — SIGNIFICANT CHANGE UP (ref 7–23)
CALCIUM SERPL-MCNC: 9.1 MG/DL — SIGNIFICANT CHANGE UP (ref 8.4–10.5)
CHLORIDE SERPL-SCNC: 95 MMOL/L — LOW (ref 96–108)
CO2 SERPL-SCNC: 23 MMOL/L — SIGNIFICANT CHANGE UP (ref 22–31)
CREAT SERPL-MCNC: 0.71 MG/DL — SIGNIFICANT CHANGE UP (ref 0.5–1.3)
EGFR: 80 ML/MIN/1.73M2 — SIGNIFICANT CHANGE UP
EGFR: 80 ML/MIN/1.73M2 — SIGNIFICANT CHANGE UP
GLUCOSE SERPL-MCNC: 117 MG/DL — HIGH (ref 70–99)
HCT VFR BLD CALC: 32.6 % — LOW (ref 34.5–45)
HGB BLD-MCNC: 10.3 G/DL — LOW (ref 11.5–15.5)
MCHC RBC-ENTMCNC: 28.9 PG — SIGNIFICANT CHANGE UP (ref 27–34)
MCHC RBC-ENTMCNC: 31.6 G/DL — LOW (ref 32–36)
MCV RBC AUTO: 91.3 FL — SIGNIFICANT CHANGE UP (ref 80–100)
NRBC BLD AUTO-RTO: 0 /100 WBCS — SIGNIFICANT CHANGE UP (ref 0–0)
PLATELET # BLD AUTO: 210 K/UL — SIGNIFICANT CHANGE UP (ref 150–400)
POTASSIUM SERPL-MCNC: 4 MMOL/L — SIGNIFICANT CHANGE UP (ref 3.5–5.3)
POTASSIUM SERPL-SCNC: 4 MMOL/L — SIGNIFICANT CHANGE UP (ref 3.5–5.3)
PROT SERPL-MCNC: 7.2 G/DL — SIGNIFICANT CHANGE UP (ref 6–8.3)
RBC # BLD: 3.57 M/UL — LOW (ref 3.8–5.2)
RBC # FLD: 13.8 % — SIGNIFICANT CHANGE UP (ref 10.3–14.5)
SODIUM SERPL-SCNC: 132 MMOL/L — LOW (ref 135–145)
WBC # BLD: 11.47 K/UL — HIGH (ref 3.8–10.5)
WBC # FLD AUTO: 11.47 K/UL — HIGH (ref 3.8–10.5)

## 2025-04-28 PROCEDURE — 83036 HEMOGLOBIN GLYCOSYLATED A1C: CPT

## 2025-04-28 PROCEDURE — 80053 COMPREHEN METABOLIC PANEL: CPT

## 2025-04-28 PROCEDURE — G0463: CPT

## 2025-04-28 PROCEDURE — 85027 COMPLETE CBC AUTOMATED: CPT

## 2025-04-28 NOTE — H&P PST ADULT - NSICDXPASTMEDICALHX_GEN_ALL_CORE_FT
PAST MEDICAL HISTORY:  Diabetes     HTN (hypertension)     Hyperlipidemia, unspecified hyperlipidemia type     Hypertension, unspecified type     Stented coronary artery      PAST MEDICAL HISTORY:  Anxiety and depression     Female cystocele     Gastroparesis     H/O pulmonary fibrosis     H/O vertebral fracture repair     History of chronic constipation     History of gastritis     History of gout     History of left bundle branch block (LBBB)     HTN (hypertension)     Hyperlipidemia, unspecified hyperlipidemia type     Peptic ulcer disease     Presence of pessary     Sensorineural hearing loss (SNHL)     Stented coronary artery     Type 2 diabetes mellitus     Uterovaginal prolapse     Vocal cord paresis

## 2025-04-28 NOTE — H&P PST ADULT - ASSESSMENT
Airway  Denies dentures or loose teeth    DASI 4.61 Airway  Mallampati III  Denies dentures or loose teeth    Activity Level  DASI <4; ambulates with walker in the house

## 2025-04-28 NOTE — H&P PST ADULT - LIVES WITH, PROFILE
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

## 2025-04-28 NOTE — H&P PST ADULT - PROBLEM SELECTOR PLAN 5
Instructed patient NOT to take Janumet or repaglinide morning of procedure  Stat fingerstick glucose upon arrival to Endo.

## 2025-04-28 NOTE — H&P PST ADULT - HISTORY OF PRESENT ILLNESS
89 year old female presents for EGD Endoflip Dilation. Pt. reports experiencing intermittent episodes of dysphagia and chest burning followed by vomiting over the past year. States that food feels "stuck." Reports an unintentional 30 lb weight loss over the past year.

## 2025-04-28 NOTE — H&P PST ADULT - PROBLEM SELECTOR PLAN 1
EGD Endoflip Dilation  Instructed patient to take her scheduled dose of pantoprazole morning of procedure with minimal water

## 2025-04-29 ENCOUNTER — APPOINTMENT (OUTPATIENT)
Dept: OTOLARYNGOLOGY | Facility: CLINIC | Age: 89
End: 2025-04-29
Payer: MEDICARE

## 2025-04-29 VITALS
WEIGHT: 128 LBS | HEART RATE: 80 BPM | DIASTOLIC BLOOD PRESSURE: 83 MMHG | HEIGHT: 59 IN | BODY MASS INDEX: 25.8 KG/M2 | SYSTOLIC BLOOD PRESSURE: 144 MMHG

## 2025-04-29 DIAGNOSIS — R13.12 DYSPHAGIA, OROPHARYNGEAL PHASE: ICD-10-CM

## 2025-04-29 DIAGNOSIS — J34.2 DEVIATED NASAL SEPTUM: ICD-10-CM

## 2025-04-29 DIAGNOSIS — H90.5 UNSPECIFIED SENSORINEURAL HEARING LOSS: ICD-10-CM

## 2025-04-29 DIAGNOSIS — H61.23 IMPACTED CERUMEN, BILATERAL: ICD-10-CM

## 2025-04-29 DIAGNOSIS — H60.63 UNSPECIFIED CHRONIC OTITIS EXTERNA, BILATERAL: ICD-10-CM

## 2025-04-29 PROBLEM — Z87.09 PERSONAL HISTORY OF OTHER DISEASES OF THE RESPIRATORY SYSTEM: Chronic | Status: ACTIVE | Noted: 2025-04-28

## 2025-04-29 PROBLEM — J38.00 PARALYSIS OF VOCAL CORDS AND LARYNX, UNSPECIFIED: Chronic | Status: ACTIVE | Noted: 2025-04-28

## 2025-04-29 PROBLEM — N81.10 CYSTOCELE, UNSPECIFIED: Chronic | Status: ACTIVE | Noted: 2025-04-28

## 2025-04-29 PROBLEM — K31.84 GASTROPARESIS: Chronic | Status: ACTIVE | Noted: 2025-04-28

## 2025-04-29 PROBLEM — Z98.890 OTHER SPECIFIED POSTPROCEDURAL STATES: Chronic | Status: ACTIVE | Noted: 2025-04-28

## 2025-04-29 PROBLEM — E11.9 TYPE 2 DIABETES MELLITUS WITHOUT COMPLICATIONS: Chronic | Status: INACTIVE | Noted: 2020-03-04 | Resolved: 2025-04-28

## 2025-04-29 PROBLEM — E11.9 TYPE 2 DIABETES MELLITUS WITHOUT COMPLICATIONS: Chronic | Status: ACTIVE | Noted: 2025-04-28

## 2025-04-29 PROBLEM — Z96.0 PRESENCE OF UROGENITAL IMPLANTS: Chronic | Status: ACTIVE | Noted: 2025-04-28

## 2025-04-29 PROBLEM — N81.4 UTEROVAGINAL PROLAPSE, UNSPECIFIED: Chronic | Status: ACTIVE | Noted: 2025-04-28

## 2025-04-29 PROBLEM — Z86.79 PERSONAL HISTORY OF OTHER DISEASES OF THE CIRCULATORY SYSTEM: Chronic | Status: ACTIVE | Noted: 2025-04-28

## 2025-04-29 PROBLEM — Z87.19 PERSONAL HISTORY OF OTHER DISEASES OF THE DIGESTIVE SYSTEM: Chronic | Status: ACTIVE | Noted: 2025-04-28

## 2025-04-29 PROBLEM — Z87.39 PERSONAL HISTORY OF OTHER DISEASES OF THE MUSCULOSKELETAL SYSTEM AND CONNECTIVE TISSUE: Chronic | Status: ACTIVE | Noted: 2025-04-28

## 2025-04-29 PROBLEM — K27.9 PEPTIC ULCER, SITE UNSPECIFIED, UNSPECIFIED AS ACUTE OR CHRONIC, WITHOUT HEMORRHAGE OR PERFORATION: Chronic | Status: ACTIVE | Noted: 2025-04-28

## 2025-04-29 PROBLEM — I10 ESSENTIAL (PRIMARY) HYPERTENSION: Chronic | Status: INACTIVE | Noted: 2020-03-04 | Resolved: 2025-04-28

## 2025-04-29 LAB
A1C WITH ESTIMATED AVERAGE GLUCOSE RESULT: 6.7 % — HIGH (ref 4–5.6)
ESTIMATED AVERAGE GLUCOSE: 146 MG/DL — HIGH (ref 68–114)

## 2025-04-29 PROCEDURE — 99213 OFFICE O/P EST LOW 20 MIN: CPT | Mod: 25

## 2025-04-29 PROCEDURE — 69210 REMOVE IMPACTED EAR WAX UNI: CPT

## 2025-04-29 RX ORDER — REPAGLINIDE 1 MG/1
1 TABLET ORAL
Refills: 0 | Status: ACTIVE | COMMUNITY

## 2025-04-30 ENCOUNTER — TRANSCRIPTION ENCOUNTER (OUTPATIENT)
Age: 89
End: 2025-04-30

## 2025-04-30 ENCOUNTER — RESULT REVIEW (OUTPATIENT)
Age: 89
End: 2025-04-30

## 2025-04-30 ENCOUNTER — APPOINTMENT (OUTPATIENT)
Dept: GASTROENTEROLOGY | Facility: HOSPITAL | Age: 89
End: 2025-04-30

## 2025-04-30 ENCOUNTER — OUTPATIENT (OUTPATIENT)
Dept: OUTPATIENT SERVICES | Facility: HOSPITAL | Age: 89
LOS: 1 days | End: 2025-04-30
Payer: MEDICARE

## 2025-04-30 VITALS
RESPIRATION RATE: 17 BRPM | OXYGEN SATURATION: 97 % | DIASTOLIC BLOOD PRESSURE: 75 MMHG | WEIGHT: 130.07 LBS | TEMPERATURE: 98 F | HEIGHT: 60 IN | HEART RATE: 84 BPM | SYSTOLIC BLOOD PRESSURE: 178 MMHG

## 2025-04-30 VITALS
RESPIRATION RATE: 16 BRPM | SYSTOLIC BLOOD PRESSURE: 153 MMHG | DIASTOLIC BLOOD PRESSURE: 74 MMHG | OXYGEN SATURATION: 96 % | HEART RATE: 91 BPM

## 2025-04-30 DIAGNOSIS — Z01.818 ENCOUNTER FOR OTHER PREPROCEDURAL EXAMINATION: ICD-10-CM

## 2025-04-30 DIAGNOSIS — Z98.890 OTHER SPECIFIED POSTPROCEDURAL STATES: Chronic | ICD-10-CM

## 2025-04-30 DIAGNOSIS — R13.12 DYSPHAGIA, OROPHARYNGEAL PHASE: ICD-10-CM

## 2025-04-30 LAB — GLUCOSE BLDC GLUCOMTR-MCNC: 178 MG/DL — HIGH (ref 70–99)

## 2025-04-30 PROCEDURE — 88305 TISSUE EXAM BY PATHOLOGIST: CPT

## 2025-04-30 PROCEDURE — 88305 TISSUE EXAM BY PATHOLOGIST: CPT | Mod: 26

## 2025-04-30 PROCEDURE — 43239 EGD BIOPSY SINGLE/MULTIPLE: CPT | Mod: XU

## 2025-04-30 PROCEDURE — 43248 EGD GUIDE WIRE INSERTION: CPT

## 2025-04-30 PROCEDURE — 82962 GLUCOSE BLOOD TEST: CPT

## 2025-04-30 PROCEDURE — 43249 ESOPH EGD DILATION <30 MM: CPT

## 2025-04-30 RX ORDER — ESCITALOPRAM OXALATE 20 MG/1
1 TABLET ORAL
Refills: 0 | DISCHARGE

## 2025-04-30 RX ORDER — ALPRAZOLAM 0.5 MG
1 TABLET, EXTENDED RELEASE 24 HR ORAL
Refills: 0 | DISCHARGE

## 2025-04-30 RX ORDER — DOCUSATE SODIUM 100 MG
1 CAPSULE ORAL
Refills: 0 | DISCHARGE

## 2025-04-30 RX ORDER — MECLIZINE HCL 12.5 MG
1 TABLET ORAL
Refills: 0 | DISCHARGE

## 2025-04-30 RX ORDER — REPAGLINIDE 1 MG/1
1 TABLET ORAL
Refills: 0 | DISCHARGE

## 2025-04-30 NOTE — ASU DISCHARGE PLAN (ADULT/PEDIATRIC) - FINANCIAL ASSISTANCE
[facility] provides services at a reduced cost to those who are determined to be eligible through [facility]’s financial assistance program. Information regarding [facility]’s financial assistance program can be found by going to [email or web address] or by calling [phone].

## 2025-04-30 NOTE — PRE PROCEDURE NOTE - PRE PROCEDURE EVALUATION
Attending Physician:              Yordy Hitchcock MD MSEd    Indication for Procedure          dysphagia      PAST MEDICAL & SURGICAL HISTORY:  Stented coronary artery      Hyperlipidemia, unspecified hyperlipidemia type      HTN (hypertension)      History of gout      Peptic ulcer disease      History of gastritis      H/O vertebral fracture repair      Gastroparesis      History of chronic constipation      Uterovaginal prolapse      Female cystocele      Presence of pessary      Sensorineural hearing loss (SNHL)      Vocal cord paresis      Anxiety and depression      History of left bundle branch block (LBBB)      H/O pulmonary fibrosis      Type 2 diabetes mellitus      H/O vertebral fracture repair            See Allscripts Note for further details  ALLERGIES:  No Known Allergies    HOME MEDICATIONS:  allopurinol 100 mg oral tablet: 1 tab(s) orally once a day  ALPRAZolam: 1 tab(s) orally once a day  amLODIPine 5 mg oral tablet: 1 tab(s) orally once a day (at bedtime)  aspirin 81 mg oral delayed release tablet: 1 tab(s) orally once a day (at bedtime)  atorvastatin 40 mg oral tablet: 1 tab(s) orally once a day (at bedtime)  buPROPion 300 mg/24 hours (XL) oral tablet, extended release: 1 tab(s) orally once a day  Caltrate one tab oral once daily:   Colace 100 mg oral capsule: 1 cap(s) orally once a day (at bedtime)  Janumet 50 mg-1000 mg oral tablet: 1 tab(s) orally 2 times a day  Lexapro 20 mg oral tablet: 1 tab(s) orally once a day (at bedtime)  Lisinopril 25 mg oral once daily:   meclizine: 1 tablet with sensor orally once a day as needed for vertigo  polyethylene glycol 3350 oral powder for reconstitution: 17 gram(s) orally once a day as needed for  constipation  repaglinide 1 mg oral tablet: 1 tab(s) orally once a day  Vitamin B 12 one tab oral once daily:       See Allscripts Note for further details    AICD/PPM: [ ] yes   [x ] no    PERTINENT LAB DATA:                        10.3   11.47 )-----------( 210      ( 28 Apr 2025 15:12 )             32.6     04-28    132[L]  |  95[L]  |  22  ----------------------------<  117[H]  4.0   |  23  |  0.71    Ca    9.1      28 Apr 2025 15:12    TPro  7.2  /  Alb  3.6  /  TBili  0.5  /  DBili  x   /  AST  14  /  ALT  8[L]  /  AlkPhos  49  04-28                PHYSICAL EXAMINATION:      Weight (kg): 59T(C): --  HR: --  BP: --  RR: --  SpO2: --    Constitutional: NAD  HEENT: PERRLA, EOMI,    Neck:  No JVD  Respiratory: CTAB/L  Cardiovascular: S1 and S2  Gastrointestinal: BS+, soft, NT/ND  Extremities: No peripheral edema  Neurological: A/O x 3, no focal deficits  Psychiatric: Normal mood, normal affect  Skin: No rashes    ASA Class: I [ ]  II [x ]  III [ ]  IV [ ]    COMMENTS:    The patient is a suitable candidate for the planned procedure unless box checked [ ]  No, explain:

## 2025-04-30 NOTE — ASU PATIENT PROFILE, ADULT - FALL HARM RISK - RISK INTERVENTIONS

## 2025-04-30 NOTE — ASU PATIENT PROFILE, ADULT - NSICDXPASTMEDICALHX_GEN_ALL_CORE_FT
PAST MEDICAL HISTORY:  Anxiety and depression     Female cystocele     Gastroparesis     H/O pulmonary fibrosis     H/O vertebral fracture repair     History of chronic constipation     History of gastritis     History of gout     History of left bundle branch block (LBBB)     HTN (hypertension)     Hyperlipidemia, unspecified hyperlipidemia type     Peptic ulcer disease     Presence of pessary     Sensorineural hearing loss (SNHL)     Stented coronary artery     Type 2 diabetes mellitus     Uterovaginal prolapse     Vocal cord paresis

## 2025-04-30 NOTE — PRE-ANESTHESIA EVALUATION ADULT - NSANTHINDVINFOSD_GEN_ALL_CORE
Spoke with pt, she was dealing with a cough with a lot of mucous last few weeks. She was tested for COVID-19 thru community testing on 5/14 and states she was called today with negative result.  Cough is better and less mucous but still feels like she can't take a deep breath. Not SOB just can't take deep breath. No fever or sinus congestion. No chest pain.  She was taking benzonatate for cough. Reviewed med list, she did not take the cetirizine she was prescribed in Feb, she still has full bottle. Discussed she could take cetirizine daily and see if symptoms improve.  She does not have any inhalers.  Advised she should be seen in ED for worsening SOB.  Any other recommendations?     patient

## 2025-04-30 NOTE — ASU DISCHARGE PLAN (ADULT/PEDIATRIC) - NS MD DC FALL RISK RISK
For information on Fall & Injury Prevention, visit: [email or web address] OR  [email or web address] OR  https://www.cdc.gov/steadi/patient.html

## 2025-05-02 LAB — SURGICAL PATHOLOGY STUDY: SIGNIFICANT CHANGE UP

## 2025-05-13 ENCOUNTER — APPOINTMENT (OUTPATIENT)
Dept: GASTROENTEROLOGY | Facility: CLINIC | Age: 89
End: 2025-05-13

## 2025-05-13 VITALS
DIASTOLIC BLOOD PRESSURE: 76 MMHG | SYSTOLIC BLOOD PRESSURE: 150 MMHG | BODY MASS INDEX: 25.6 KG/M2 | HEIGHT: 59 IN | WEIGHT: 127 LBS | OXYGEN SATURATION: 99 % | HEART RATE: 81 BPM

## 2025-05-13 DIAGNOSIS — R11.2 NAUSEA WITH VOMITING, UNSPECIFIED: ICD-10-CM

## 2025-05-13 PROCEDURE — 99214 OFFICE O/P EST MOD 30 MIN: CPT

## 2025-05-15 ENCOUNTER — APPOINTMENT (OUTPATIENT)
Dept: UROGYNECOLOGY | Facility: CLINIC | Age: 89
End: 2025-05-15
Payer: MEDICARE

## 2025-05-15 VITALS
SYSTOLIC BLOOD PRESSURE: 139 MMHG | OXYGEN SATURATION: 99 % | DIASTOLIC BLOOD PRESSURE: 81 MMHG | WEIGHT: 127 LBS | BODY MASS INDEX: 25.6 KG/M2 | HEART RATE: 85 BPM | HEIGHT: 59 IN

## 2025-05-15 DIAGNOSIS — N89.8 OTHER SPECIFIED COMPLICATION OF GENITOURINARY PROSTHETIC DEVICES, IMPLANTS AND GRAFTS, INITIAL ENCOUNTER: ICD-10-CM

## 2025-05-15 DIAGNOSIS — Z46.89 ENCOUNTER FOR FITTING AND ADJUSTMENT OF OTHER SPECIFIED DEVICES: ICD-10-CM

## 2025-05-15 DIAGNOSIS — T83.89XA OTHER SPECIFIED COMPLICATION OF GENITOURINARY PROSTHETIC DEVICES, IMPLANTS AND GRAFTS, INITIAL ENCOUNTER: ICD-10-CM

## 2025-05-15 PROCEDURE — 99459 PELVIC EXAMINATION: CPT

## 2025-05-15 PROCEDURE — 17250 CHEM CAUT OF GRANLTJ TISSUE: CPT

## 2025-05-15 PROCEDURE — 99213 OFFICE O/P EST LOW 20 MIN: CPT | Mod: 25

## 2025-05-15 RX ORDER — ESTRADIOL 0.1 MG/G
0.1 CREAM VAGINAL
Qty: 1 | Refills: 0 | Status: ACTIVE | COMMUNITY
Start: 2025-05-15 | End: 1900-01-01

## 2025-05-19 ENCOUNTER — RX RENEWAL (OUTPATIENT)
Age: 89
End: 2025-05-19

## 2025-05-26 PROBLEM — R13.12 DYSPHAGIA, OROPHARYNGEAL: Status: RESOLVED | Noted: 2023-09-05 | Resolved: 2025-05-26

## 2025-05-26 PROBLEM — R68.81 EARLY SATIETY: Status: ACTIVE | Noted: 2025-05-26

## 2025-05-26 PROBLEM — R07.89 ATYPICAL CHEST PAIN: Status: RESOLVED | Noted: 2025-04-16 | Resolved: 2025-05-26

## 2025-05-27 ENCOUNTER — APPOINTMENT (OUTPATIENT)
Dept: NUCLEAR MEDICINE | Facility: HOSPITAL | Age: 89
End: 2025-05-27
Payer: MEDICARE

## 2025-05-27 ENCOUNTER — OUTPATIENT (OUTPATIENT)
Dept: OUTPATIENT SERVICES | Facility: HOSPITAL | Age: 89
LOS: 1 days | End: 2025-05-27

## 2025-05-27 DIAGNOSIS — R11.2 NAUSEA WITH VOMITING, UNSPECIFIED: ICD-10-CM

## 2025-05-27 DIAGNOSIS — Z98.890 OTHER SPECIFIED POSTPROCEDURAL STATES: Chronic | ICD-10-CM

## 2025-05-27 PROCEDURE — 78264 GASTRIC EMPTYING IMG STUDY: CPT | Mod: 26

## 2025-07-08 ENCOUNTER — APPOINTMENT (OUTPATIENT)
Dept: GASTROENTEROLOGY | Facility: CLINIC | Age: 89
End: 2025-07-08

## 2025-07-17 ENCOUNTER — APPOINTMENT (OUTPATIENT)
Dept: UROGYNECOLOGY | Facility: CLINIC | Age: 89
End: 2025-07-17
Payer: MEDICARE

## 2025-07-17 VITALS
HEART RATE: 80 BPM | OXYGEN SATURATION: 99 % | HEIGHT: 59 IN | DIASTOLIC BLOOD PRESSURE: 66 MMHG | BODY MASS INDEX: 25.6 KG/M2 | SYSTOLIC BLOOD PRESSURE: 126 MMHG | WEIGHT: 127 LBS

## 2025-07-17 PROCEDURE — 17250 CHEM CAUT OF GRANLTJ TISSUE: CPT

## 2025-07-17 PROCEDURE — 99213 OFFICE O/P EST LOW 20 MIN: CPT | Mod: 25

## 2025-09-11 ENCOUNTER — APPOINTMENT (OUTPATIENT)
Dept: UROGYNECOLOGY | Facility: CLINIC | Age: 89
End: 2025-09-11

## 2025-09-11 DIAGNOSIS — T83.89XA OTHER SPECIFIED COMPLICATION OF GENITOURINARY PROSTHETIC DEVICES, IMPLANTS AND GRAFTS, INITIAL ENCOUNTER: ICD-10-CM

## 2025-09-11 DIAGNOSIS — N81.11 CYSTOCELE, MIDLINE: ICD-10-CM

## 2025-09-11 DIAGNOSIS — N81.2 INCOMPLETE UTEROVAGINAL PROLAPSE: ICD-10-CM

## 2025-09-11 DIAGNOSIS — Z46.89 ENCOUNTER FOR FITTING AND ADJUSTMENT OF OTHER SPECIFIED DEVICES: ICD-10-CM

## 2025-09-11 DIAGNOSIS — N89.8 OTHER SPECIFIED COMPLICATION OF GENITOURINARY PROSTHETIC DEVICES, IMPLANTS AND GRAFTS, INITIAL ENCOUNTER: ICD-10-CM

## 2025-09-11 PROCEDURE — 99213 OFFICE O/P EST LOW 20 MIN: CPT

## 2025-09-11 PROCEDURE — 99459 PELVIC EXAMINATION: CPT

## (undated) DEVICE — SENSOR O2 FINGER ADULT

## (undated) DEVICE — CATH IV SAFE BC 20G X 1.16" (PINK)

## (undated) DEVICE — PACK IV START WITH CHG

## (undated) DEVICE — SOL INJ NS 0.9% 500ML 2 PORT

## (undated) DEVICE — TUBING SUCTION CONN 6FT STERILE

## (undated) DEVICE — BIOPSY FORCEP RADIAL JAW 4 STANDARD WITH NEEDLE

## (undated) DEVICE — TUBING SUCTION 20FT

## (undated) DEVICE — BALLOON US ENDO

## (undated) DEVICE — CATH IV SAFE BC 22G X 1" (BLUE)

## (undated) DEVICE — SUCTION YANKAUER NO CONTROL VENT

## (undated) DEVICE — BITE BLOCK ADULT 20 X 27MM (GREEN)

## (undated) DEVICE — FOLEY HOLDER STATLOCK 2 WAY ADULT

## (undated) DEVICE — SYR ALLIANCE II INFLATION 60ML

## (undated) DEVICE — TUBING IV SET GRAVITY 3Y 100" MACRO